# Patient Record
Sex: MALE | Race: WHITE | NOT HISPANIC OR LATINO | Employment: OTHER | URBAN - METROPOLITAN AREA
[De-identification: names, ages, dates, MRNs, and addresses within clinical notes are randomized per-mention and may not be internally consistent; named-entity substitution may affect disease eponyms.]

---

## 2018-04-17 LAB
ABSOL LYMPHOCYTES (HISTORICAL): 1.4 K/UL (ref 0.5–4)
ALBUMIN SERPL BCP-MCNC: 4.1 G/DL (ref 3–5.2)
ALP SERPL-CCNC: 96 U/L (ref 43–122)
ALT SERPL W P-5'-P-CCNC: 31 U/L (ref 9–52)
ANION GAP SERPL CALCULATED.3IONS-SCNC: 10 MMOL/L (ref 5–14)
APTT PPP: 28 SEC (ref 23–31)
AST SERPL W P-5'-P-CCNC: 21 U/L (ref 17–59)
BASOPHILS # BLD AUTO: 0 K/UL (ref 0–0.1)
BASOPHILS # BLD AUTO: 1 % (ref 0–1)
BILIRUB SERPL-MCNC: 1.1 MG/DL
BILIRUB UR QL STRIP: NEGATIVE MG/DL
BUN SERPL-MCNC: 13 MG/DL (ref 5–25)
C-REACTIVE PROTEIN (HISTORICAL): <0.5 MG/DL
CALCIUM SERPL-MCNC: 9.5 MG/DL (ref 8.4–10.2)
CHLORIDE SERPL-SCNC: 108 MEQ/L (ref 97–108)
CHOLEST SERPL-MCNC: 183 MG/DL
CHOLEST/HDLC SERPL: 3.7 {RATIO}
CLARITY UR: CLEAR
CO2 SERPL-SCNC: 24 MMOL/L (ref 22–30)
COLOR UR: YELLOW
CREATINE, SERUM (HISTORICAL): 0.92 MG/DL (ref 0.7–1.5)
CREATININE, RANDOM URINE (HISTORICAL): 92.7 MG/DL (ref 50–200)
DEPRECATED RDW RBC AUTO: 13.4 %
EGFR (HISTORICAL): >60 ML/MIN/1.73 M2
EOSINOPHIL # BLD AUTO: 0 K/UL (ref 0–0.4)
EOSINOPHIL NFR BLD AUTO: 0 % (ref 0–6)
ERYTHROCYTE SEDIMENTATION RATE (HISTORICAL): 14 MM (ref 1–20)
EST. AVERAGE GLUCOSE BLD GHB EST-MCNC: 146 MG/DL
GLUCOSE SERPL-MCNC: 101 MG/DL (ref 70–99)
GLUCOSE UR STRIP-MCNC: NEGATIVE MG/DL
HBA1C MFR BLD HPLC: 6.7 %
HCT VFR BLD AUTO: 42.1 % (ref 41–53)
HDLC SERPL-MCNC: 50 MG/DL
HGB BLD-MCNC: 13.8 G/DL (ref 13.5–17.5)
HGB UR QL STRIP.AUTO: NEGATIVE
KETONES UR STRIP-MCNC: NEGATIVE MG/DL
LDH (HISTORICAL): 354 U/L (ref 313–618)
LDL/HDL RATIO (HISTORICAL): 2.3
LDLC SERPL CALC-MCNC: 114 MG/DL
LEUKOCYTE ESTERASE UR QL STRIP: NEGATIVE
LYMPHOCYTES NFR BLD AUTO: 25 % (ref 25–45)
MCH RBC QN AUTO: 29 PG (ref 26–34)
MCHC RBC AUTO-ENTMCNC: 32.7 % (ref 31–36)
MCV RBC AUTO: 89 FL (ref 80–100)
MICROALBUM.,U,RANDOM (HISTORICAL): <0.6 MG/DL
MICROALBUMIN/CREATININE RATIO (HISTORICAL): NORMAL
MONOCYTES # BLD AUTO: 0.5 K/UL (ref 0.2–0.9)
MONOCYTES NFR BLD AUTO: 9 % (ref 1–10)
NEUTROPHILS ABS COUNT (HISTORICAL): 3.7 K/UL (ref 1.8–7.8)
NEUTS SEG NFR BLD AUTO: 65 % (ref 45–65)
NITRITE UR QL STRIP: NEGATIVE
PH UR STRIP.AUTO: 7 [PH] (ref 4.5–8)
PLATELET # BLD AUTO: 107 K/MCL (ref 150–450)
POTASSIUM SERPL-SCNC: 4.2 MEQ/L (ref 3.6–5)
PROT UR STRIP-MCNC: NEGATIVE MG/DL
PT - I.N. RATIO (HISTORICAL): 1.1 RATIO(INR)
PT, PATIENT (HISTORICAL): 10.7 SEC (ref 9.2–11.1)
RBC # BLD AUTO: 4.74 M/MCL (ref 4.5–5.9)
SODIUM SERPL-SCNC: 142 MEQ/L (ref 137–147)
SP GR UR STRIP.AUTO: 1.01 (ref 1–1.04)
TOTAL PROTEIN (HISTORICAL): 6.6 G/DL (ref 5.9–8.4)
TRIGL SERPL-MCNC: 93 MG/DL
TSH SERPL DL<=0.05 MIU/L-ACNC: 2.62 UIU/ML (ref 0.47–4.68)
UROBILINOGEN UR QL STRIP.AUTO: NEGATIVE MG/DL (ref 0–1)
VIT B12 SERPL-MCNC: 428 PG/ML (ref 239–931)
VITAMIN D25-HYDROXY (HISTORICAL): <12.8 NG/ML (ref 30–100)
VLDLC SERPL CALC-MCNC: 19 MG/DL (ref 0–40)
WBC # BLD AUTO: 5.6 K/MCL (ref 4.5–11)

## 2018-04-19 LAB — METHYLMALONIC ACID, S (HISTORICAL): 0.31

## 2018-07-10 RX ORDER — CYANOCOBALAMIN 1000 UG/ML
1000 INJECTION INTRAMUSCULAR; SUBCUTANEOUS ONCE
Status: COMPLETED | OUTPATIENT
Start: 2018-07-11 | End: 2018-07-11

## 2018-07-11 ENCOUNTER — HOSPITAL ENCOUNTER (OUTPATIENT)
Dept: INFUSION CENTER | Facility: HOSPITAL | Age: 53
Discharge: HOME | End: 2018-07-11
Payer: COMMERCIAL

## 2018-07-11 PROCEDURE — 96372 THER/PROPH/DIAG INJ SC/IM: CPT

## 2018-07-11 RX ORDER — LITHIUM CARBONATE 300 MG
300 TABLET ORAL 2 TIMES DAILY
COMMUNITY

## 2018-07-11 RX ADMIN — CYANOCOBALAMIN 1000 MCG: 1000 INJECTION INTRAMUSCULAR; SUBCUTANEOUS at 11:27

## 2018-08-04 ENCOUNTER — APPOINTMENT (OUTPATIENT)
Dept: LAB | Facility: HOSPITAL | Age: 53
End: 2018-08-04
Payer: COMMERCIAL

## 2018-08-04 ENCOUNTER — TRANSCRIBE ORDERS (OUTPATIENT)
Dept: ADMINISTRATIVE | Facility: HOSPITAL | Age: 53
End: 2018-08-04

## 2018-08-04 DIAGNOSIS — E13.8 DIABETES MELLITUS OF OTHER TYPE WITH COMPLICATION, UNSPECIFIED WHETHER LONG TERM INSULIN USE: Primary | ICD-10-CM

## 2018-08-04 DIAGNOSIS — I10 ESSENTIAL HYPERTENSION, MALIGNANT: ICD-10-CM

## 2018-08-04 DIAGNOSIS — E78.5 HYPERLIPIDEMIA, UNSPECIFIED HYPERLIPIDEMIA TYPE: ICD-10-CM

## 2018-08-04 DIAGNOSIS — E13.8 DIABETES MELLITUS OF OTHER TYPE WITH COMPLICATION, UNSPECIFIED WHETHER LONG TERM INSULIN USE: ICD-10-CM

## 2018-08-04 LAB
ALBUMIN SERPL BCP-MCNC: 3.9 G/DL (ref 3–5.2)
ALP SERPL-CCNC: 84 U/L (ref 43–122)
ALT SERPL W P-5'-P-CCNC: 38 U/L (ref 9–52)
ANION GAP SERPL CALCULATED.3IONS-SCNC: 9 MMOL/L (ref 5–14)
AST SERPL W P-5'-P-CCNC: 26 U/L (ref 17–59)
BASOPHILS # BLD AUTO: 0 THOUSANDS/ΜL (ref 0–0.1)
BASOPHILS NFR BLD AUTO: 0 % (ref 0–1)
BILIRUB SERPL-MCNC: 0.7 MG/DL
BUN SERPL-MCNC: 10 MG/DL (ref 5–25)
CALCIUM SERPL-MCNC: 9.1 MG/DL (ref 8.4–10.2)
CHLORIDE SERPL-SCNC: 113 MMOL/L (ref 97–108)
CHOLEST SERPL-MCNC: 173 MG/DL
CO2 SERPL-SCNC: 23 MMOL/L (ref 22–30)
CREAT SERPL-MCNC: 0.94 MG/DL (ref 0.7–1.5)
EOSINOPHIL # BLD AUTO: 0 THOUSAND/ΜL (ref 0–0.4)
EOSINOPHIL NFR BLD AUTO: 0 % (ref 0–6)
ERYTHROCYTE [DISTWIDTH] IN BLOOD BY AUTOMATED COUNT: 13.4 %
EST. AVERAGE GLUCOSE BLD GHB EST-MCNC: 117 MG/DL
GFR SERPL CREATININE-BSD FRML MDRD: 93 ML/MIN/1.73SQ M
GLUCOSE P FAST SERPL-MCNC: 84 MG/DL (ref 70–99)
HBA1C MFR BLD: 5.7 % (ref 4.2–6.3)
HCT VFR BLD AUTO: 40.1 % (ref 41–53)
HDLC SERPL-MCNC: 39 MG/DL (ref 40–59)
HGB BLD-MCNC: 13.3 G/DL (ref 13.5–17.5)
LDLC SERPL CALC-MCNC: 110 MG/DL
LYMPHOCYTES # BLD AUTO: 1.3 THOUSANDS/ΜL (ref 0.5–4)
LYMPHOCYTES NFR BLD AUTO: 27 % (ref 20–50)
MCH RBC QN AUTO: 30 PG (ref 26–34)
MCHC RBC AUTO-ENTMCNC: 33.3 G/DL (ref 31–36)
MCV RBC AUTO: 90 FL (ref 80–100)
MONOCYTES # BLD AUTO: 0.4 THOUSAND/ΜL (ref 0.2–0.9)
MONOCYTES NFR BLD AUTO: 8 % (ref 1–10)
NEUTROPHILS # BLD AUTO: 3.3 THOUSANDS/ΜL (ref 1.8–7.8)
NEUTS SEG NFR BLD AUTO: 66 % (ref 45–65)
NONHDLC SERPL-MCNC: 134 MG/DL
PLATELET # BLD AUTO: 118 THOUSANDS/UL (ref 150–450)
PMV BLD AUTO: 8.6 FL (ref 8.9–12.7)
POTASSIUM SERPL-SCNC: 3.9 MMOL/L (ref 3.6–5)
PROT SERPL-MCNC: 6.7 G/DL (ref 5.9–8.4)
RBC # BLD AUTO: 4.45 MILLION/UL (ref 4.5–5.9)
SODIUM SERPL-SCNC: 145 MMOL/L (ref 137–147)
TRIGL SERPL-MCNC: 118 MG/DL
TSH SERPL DL<=0.05 MIU/L-ACNC: 2.94 UIU/ML (ref 0.47–4.68)
WBC # BLD AUTO: 5 THOUSAND/UL (ref 4.5–11)

## 2018-08-04 PROCEDURE — 85025 COMPLETE CBC W/AUTO DIFF WBC: CPT

## 2018-08-04 PROCEDURE — 36415 COLL VENOUS BLD VENIPUNCTURE: CPT

## 2018-08-04 PROCEDURE — 84443 ASSAY THYROID STIM HORMONE: CPT

## 2018-08-04 PROCEDURE — 80061 LIPID PANEL: CPT

## 2018-08-04 PROCEDURE — 80053 COMPREHEN METABOLIC PANEL: CPT

## 2018-08-04 PROCEDURE — 83036 HEMOGLOBIN GLYCOSYLATED A1C: CPT

## 2018-08-07 RX ORDER — CYANOCOBALAMIN 1000 UG/ML
1000 INJECTION INTRAMUSCULAR; SUBCUTANEOUS ONCE
Status: COMPLETED | OUTPATIENT
Start: 2018-08-08 | End: 2018-08-08

## 2018-08-08 ENCOUNTER — HOSPITAL ENCOUNTER (OUTPATIENT)
Dept: INFUSION CENTER | Facility: HOSPITAL | Age: 53
Discharge: HOME | End: 2018-08-08
Payer: COMMERCIAL

## 2018-08-08 PROCEDURE — 96372 THER/PROPH/DIAG INJ SC/IM: CPT

## 2018-08-08 RX ORDER — CYANOCOBALAMIN 1000 UG/ML
INJECTION INTRAMUSCULAR; SUBCUTANEOUS
Status: COMPLETED
Start: 2018-08-08 | End: 2018-08-08

## 2018-08-08 RX ORDER — ALPRAZOLAM 0.5 MG/1
TABLET ORAL 3 TIMES DAILY
COMMUNITY

## 2018-08-08 RX ORDER — CLOZAPINE 100 MG/1
100 TABLET ORAL DAILY
COMMUNITY

## 2018-08-08 RX ADMIN — CYANOCOBALAMIN 1000 MCG: 1000 INJECTION INTRAMUSCULAR; SUBCUTANEOUS at 13:24

## 2018-08-08 NOTE — PROGRESS NOTES
Patient tolerated B12 to right deltoid IM without complications  Aware of next appts and AVS provided

## 2018-08-08 NOTE — PLAN OF CARE
Problem: Potential for Falls  Goal: Patient will remain free of falls  INTERVENTIONS:  - Assess patient frequently for physical needs  -  Identify cognitive and physical deficits and behaviors that affect risk of falls    -  [place] fall precautions as indicated by assessment   - Educate patient/family on patient safety including physical limitations  - Instruct patient to call for assistance with activity based on assessment  - Modify environment to reduce risk of injury  - Consider OT/PT consult to assist with strengthening/mobility   Outcome: Progressing

## 2018-09-04 RX ORDER — CYANOCOBALAMIN 1000 UG/ML
1000 INJECTION INTRAMUSCULAR; SUBCUTANEOUS ONCE
Status: COMPLETED | OUTPATIENT
Start: 2018-09-05 | End: 2018-09-05

## 2018-09-05 ENCOUNTER — HOSPITAL ENCOUNTER (OUTPATIENT)
Dept: INFUSION CENTER | Facility: HOSPITAL | Age: 53
Discharge: HOME | End: 2018-09-05
Payer: COMMERCIAL

## 2018-09-05 PROCEDURE — 96372 THER/PROPH/DIAG INJ SC/IM: CPT

## 2018-09-05 RX ORDER — CYANOCOBALAMIN 1000 UG/ML
INJECTION INTRAMUSCULAR; SUBCUTANEOUS
Status: COMPLETED
Start: 2018-09-05 | End: 2018-09-05

## 2018-09-05 RX ADMIN — CYANOCOBALAMIN 1000 MCG: 1000 INJECTION INTRAMUSCULAR; SUBCUTANEOUS at 08:35

## 2018-09-05 NOTE — PLAN OF CARE
Problem: Potential for Falls  Goal: Patient will remain free of falls  INTERVENTIONS:  - Assess patient frequently for physical needs  -  Identify cognitive and physical deficits and behaviors that affect risk of falls    -  [place] fall precautions as indicated by assessment   - Educate patient/family on patient safety including physical limitations  - Instruct patient to call for assistance with activity based on assessment  - Modify environment to reduce risk of injury  - Consider OT/PT consult to assist with strengthening/mobility   Outcome: Progressing

## 2018-09-05 NOTE — PROGRESS NOTES
Patient arrives to unit without complaints  Patient tolerated Vit B12 to right deltoid without complications  Confirmed next appt and AVS declined

## 2018-09-10 ENCOUNTER — APPOINTMENT (OUTPATIENT)
Dept: LAB | Facility: HOSPITAL | Age: 53
End: 2018-09-10
Attending: INTERNAL MEDICINE
Payer: COMMERCIAL

## 2018-09-10 ENCOUNTER — TRANSCRIBE ORDERS (OUTPATIENT)
Dept: ADMINISTRATIVE | Facility: HOSPITAL | Age: 53
End: 2018-09-10

## 2018-09-10 DIAGNOSIS — D51.9 ANEMIA DUE TO VITAMIN B12 DEFICIENCY, UNSPECIFIED B12 DEFICIENCY TYPE: ICD-10-CM

## 2018-09-10 DIAGNOSIS — D69.6 THROMBOCYTOPENIA, UNSPECIFIED (HCC): Primary | ICD-10-CM

## 2018-09-10 DIAGNOSIS — D69.6 THROMBOCYTOPENIA, UNSPECIFIED (HCC): ICD-10-CM

## 2018-09-10 LAB
ALBUMIN SERPL BCP-MCNC: 4 G/DL (ref 3–5.2)
ALP SERPL-CCNC: 88 U/L (ref 43–122)
ALT SERPL W P-5'-P-CCNC: 26 U/L (ref 9–52)
ANION GAP SERPL CALCULATED.3IONS-SCNC: 7 MMOL/L (ref 5–14)
AST SERPL W P-5'-P-CCNC: 19 U/L (ref 17–59)
BASOPHILS # BLD AUTO: 0 THOUSANDS/ΜL (ref 0–0.1)
BASOPHILS NFR BLD AUTO: 1 % (ref 0–1)
BILIRUB SERPL-MCNC: 0.7 MG/DL
BUN SERPL-MCNC: 9 MG/DL (ref 5–25)
CALCIUM SERPL-MCNC: 9.5 MG/DL (ref 8.4–10.2)
CHLORIDE SERPL-SCNC: 110 MMOL/L (ref 97–108)
CO2 SERPL-SCNC: 28 MMOL/L (ref 22–30)
CREAT SERPL-MCNC: 0.95 MG/DL (ref 0.7–1.5)
CRP SERPL QL: 4.5 MG/L
EOSINOPHIL # BLD AUTO: 0 THOUSAND/ΜL (ref 0–0.4)
EOSINOPHIL NFR BLD AUTO: 0 % (ref 0–6)
ERYTHROCYTE [DISTWIDTH] IN BLOOD BY AUTOMATED COUNT: 13.1 %
ERYTHROCYTE [SEDIMENTATION RATE] IN BLOOD: 15 MM/HOUR (ref 1–20)
GFR SERPL CREATININE-BSD FRML MDRD: 92 ML/MIN/1.73SQ M
GLUCOSE P FAST SERPL-MCNC: 97 MG/DL (ref 70–99)
HCT VFR BLD AUTO: 40.2 % (ref 41–53)
HGB BLD-MCNC: 13.3 G/DL (ref 13.5–17.5)
LDH SERPL-CCNC: 332 U/L (ref 313–618)
LYMPHOCYTES # BLD AUTO: 1.2 THOUSANDS/ΜL (ref 0.5–4)
LYMPHOCYTES NFR BLD AUTO: 23 % (ref 20–50)
MCH RBC QN AUTO: 30 PG (ref 26–34)
MCHC RBC AUTO-ENTMCNC: 33 G/DL (ref 31–36)
MCV RBC AUTO: 91 FL (ref 80–100)
MONOCYTES # BLD AUTO: 0.5 THOUSAND/ΜL (ref 0.2–0.9)
MONOCYTES NFR BLD AUTO: 10 % (ref 1–10)
NEUTROPHILS # BLD AUTO: 3.4 THOUSANDS/ΜL (ref 1.8–7.8)
NEUTS SEG NFR BLD AUTO: 67 % (ref 45–65)
PLATELET # BLD AUTO: 129 THOUSANDS/UL (ref 150–450)
PMV BLD AUTO: 8.5 FL (ref 8.9–12.7)
POTASSIUM SERPL-SCNC: 4.2 MMOL/L (ref 3.6–5)
PROT SERPL-MCNC: 7.2 G/DL (ref 5.9–8.4)
RBC # BLD AUTO: 4.42 MILLION/UL (ref 4.5–5.9)
SODIUM SERPL-SCNC: 145 MMOL/L (ref 137–147)
VIT B12 SERPL-MCNC: 715 PG/ML (ref 100–900)
WBC # BLD AUTO: 5.2 THOUSAND/UL (ref 4.5–11)

## 2018-09-10 PROCEDURE — 36415 COLL VENOUS BLD VENIPUNCTURE: CPT

## 2018-09-10 PROCEDURE — 83918 ORGANIC ACIDS TOTAL QUANT: CPT

## 2018-09-10 PROCEDURE — 85025 COMPLETE CBC W/AUTO DIFF WBC: CPT

## 2018-09-10 PROCEDURE — 82607 VITAMIN B-12: CPT

## 2018-09-10 PROCEDURE — 80053 COMPREHEN METABOLIC PANEL: CPT

## 2018-09-10 PROCEDURE — 85652 RBC SED RATE AUTOMATED: CPT

## 2018-09-10 PROCEDURE — 83615 LACTATE (LD) (LDH) ENZYME: CPT

## 2018-09-10 PROCEDURE — 86140 C-REACTIVE PROTEIN: CPT

## 2018-09-12 ENCOUNTER — OFFICE VISIT (OUTPATIENT)
Dept: HEMATOLOGY ONCOLOGY | Facility: CLINIC | Age: 53
End: 2018-09-12
Payer: COMMERCIAL

## 2018-09-12 VITALS
TEMPERATURE: 97.8 F | HEART RATE: 88 BPM | DIASTOLIC BLOOD PRESSURE: 70 MMHG | OXYGEN SATURATION: 98 % | BODY MASS INDEX: 26.82 KG/M2 | WEIGHT: 198 LBS | HEIGHT: 72 IN | RESPIRATION RATE: 16 BRPM | SYSTOLIC BLOOD PRESSURE: 110 MMHG

## 2018-09-12 DIAGNOSIS — D69.6 THROMBOCYTOPENIA (HCC): Primary | ICD-10-CM

## 2018-09-12 DIAGNOSIS — D64.9 ANEMIA, UNSPECIFIED TYPE: ICD-10-CM

## 2018-09-12 LAB
METHYLMALONATE SERPL-SCNC: 168 NMOL/L (ref 0–378)
SL AMB DISCLAIMER: NORMAL

## 2018-09-12 PROCEDURE — 99213 OFFICE O/P EST LOW 20 MIN: CPT | Performed by: INTERNAL MEDICINE

## 2018-09-12 NOTE — PROGRESS NOTES
Hematology/Oncology Outpatient Follow-up  Luba Ocampo 47 y o  male 1965 [id number]    Date:  9/12/2018    Assessment and Plan:  1  Thrombocytopenia ([address] )  Chronic and stable Most likely due to the liver cirrhosis  We will continue to monitor him every 6 months   - CBC and differential; Future  - Comprehensive metabolic panel; Future  - C-reactive protein; Future  - Sedimentation rate, automated; Future    2  Anemia, unspecified type  Mild normocytic, Multifactorial including anemia of chronic disease, we will then initiate workup prior to his next visit  - Iron Panel; Future  - Vitamin B12; Future      HPI:    The patient came in today for a follow-up visit  He has multiple comorbid conditions including heart arrhythmia, type 2 diabetes mellitus, liver cirrhosis due to fatty liver, history of arthritis, or gout, hyperuricemia, hypothyroidism, schizoaffective disorder, anxiety, depression, etc     Patient had extensive workup for his mild chronic stable thrombocytopenia which goes back to at least 2015  He was found to have vitamin B12 deficiency which was corrected through vitamin B12 injections  He had no hint of monoclonal gammopathy  His most recent blood work shows hemoglobin level of 13 3 and a platelet count of a 723004  The patient denies bleeding or easy bruising  Interval history:    ROS: Review of Systems   Constitutional: Positive for appetite change and fatigue  Negative for diaphoresis and fever  HENT: Negative for congestion, dental problem, facial swelling, hearing loss, tinnitus and trouble swallowing  Eyes: Negative for visual disturbance  Respiratory: Negative for cough, chest tightness, shortness of breath and wheezing  Cardiovascular: Negative for chest pain and leg swelling  Gastrointestinal: Negative for abdominal distention, abdominal pain, blood in stool, constipation, diarrhea, nausea and vomiting  Genitourinary: Negative for dysuria, hematuria and urgency  Musculoskeletal: Negative for arthralgias, myalgias and neck pain  Skin: Negative  Negative for color change, pallor, rash and wound  Neurological: Positive for dizziness and headaches  Negative for weakness and numbness  Hematological: Negative for adenopathy  Psychiatric/Behavioral: Positive for sleep disturbance  Negative for agitation, behavioral problems, confusion, hallucinations and self-injury  The patient is not nervous/anxious and is not hyperactive  Past Medical History:   Diagnosis Date    Bipolar disorder ([place] [address] )     Diabetes mellitus, type II ([place] [address] )     PAT (paroxysmal atrial tachycardia) ([place] [address] )     Schizophrenic disorder ([place] [address] )        History reviewed  No pertinent surgical history      Social History     Social History    Marital status: Single     Spouse name: N/A    Number of children: N/A    Years of education: N/A     Social History Main Topics    Smoking status: Current Every Day Smoker    Smokeless tobacco: Never Used    Alcohol use No    Drug use: No    Sexual activity: Not Asked     Other Topics Concern    None     Social History Narrative    None       Family History   Problem Relation Age of Onset    Coronary artery disease Mother         premature       Allergies   Allergen Reactions    Bupropion     Chlorproethazine     Molindone     Paliperidone     Thiazide-Type Diuretics          Current Outpatient Prescriptions:     ALPRAZolam (XANAX) 0 5 mg tablet, Take by mouth 3 (three) times a day, Disp: , Rfl:     cloZAPine (CLOZARIL) 100 mg tablet, Take 100 mg by mouth daily Takes one 200mg tab and one 100mg tab at bedtime = 300mg, Disp: , Rfl:     lithium 300 MG tablet, Take 300 mg by mouth 2 (two) times a day, Disp: , Rfl:       Physical Exam:  /70 (BP Location: Left arm, Patient Position: Sitting, Cuff Size: Adult)   Pulse 88   Temp 97 8 °F (36 6 °C) (Tympanic)   Resp 16   Ht 6' (1 829 m)   Wt 89 8 kg (198 lb)   SpO2 98%   BMI 26 85 kg/m² Physical Exam   Constitutional: He is oriented to person, place, and time  He appears well-developed and well-nourished  HENT:   Head: Normocephalic and atraumatic  Nose: Nose normal    Mouth/Throat: Oropharynx is clear and moist    Eyes: Conjunctivae and EOM are normal  Pupils are equal, round, and reactive to light  Right eye exhibits no discharge  Left eye exhibits no discharge  No scleral icterus  Neck: Normal range of motion  Neck supple  No tracheal deviation present  No thyromegaly present  Cardiovascular: Normal rate, regular rhythm and normal heart sounds  Exam reveals no friction rub  No murmur heard  Pulmonary/Chest: Effort normal and breath sounds normal  No respiratory distress  He has no wheezes  He has no rales  He exhibits no tenderness  Abdominal: Soft  Bowel sounds are normal  He exhibits no distension and no mass  There is no hepatosplenomegaly, splenomegaly or hepatomegaly  There is no tenderness  There is no rebound and no guarding  Obese with abdominal wall hernia   Musculoskeletal: Normal range of motion  He exhibits no edema, tenderness or deformity  Lymphadenopathy:     He has no cervical adenopathy  Neurological: He is alert and oriented to person, place, and time  He has normal reflexes  No cranial nerve deficit  Coordination normal    Skin: Skin is warm and dry  No rash noted  No erythema  No pallor  Psychiatric: He has a normal mood and affect   His behavior is normal  Judgment and thought content normal          Labs:  Lab Results   Component Value Date    WBC 5 20 09/10/2018    HGB 13 3 (L) 09/10/2018    HCT 40 2 (L) 09/10/2018    MCV 91 09/10/2018     (L) 09/10/2018     Lab Results   Component Value Date     09/10/2018    K 4 2 09/10/2018     (H) 09/10/2018    CO2 28 09/10/2018    ANIONGAP 10 04/17/2018    BUN 9 09/10/2018    CREATININE 0 95 09/10/2018    GLUCOSE 101 (H) 04/17/2018    GLUF 97 09/10/2018    CALCIUM 9 5 09/10/2018    AST 19 09/10/2018    ALT 26 09/10/2018    ALKPHOS 88 09/10/2018    PROT 6 6 04/17/2018    BILITOT 1 1 04/17/2018    EGFR 92 09/10/2018       Patient voiced understanding and agreement in the above discussion  Aware to contact our office with questions/symptoms in the interim

## 2018-10-02 RX ORDER — CYANOCOBALAMIN 1000 UG/ML
1000 INJECTION INTRAMUSCULAR; SUBCUTANEOUS ONCE
Status: COMPLETED | OUTPATIENT
Start: 2018-10-03 | End: 2018-10-03

## 2018-10-03 ENCOUNTER — HOSPITAL ENCOUNTER (OUTPATIENT)
Dept: INFUSION CENTER | Facility: HOSPITAL | Age: 53
Discharge: HOME | End: 2018-10-03
Payer: COMMERCIAL

## 2018-10-03 PROCEDURE — 96372 THER/PROPH/DIAG INJ SC/IM: CPT

## 2018-10-03 RX ORDER — CYANOCOBALAMIN 1000 UG/ML
INJECTION INTRAMUSCULAR; SUBCUTANEOUS
Status: COMPLETED
Start: 2018-10-03 | End: 2018-10-03

## 2018-10-03 RX ADMIN — CYANOCOBALAMIN: 1000 INJECTION INTRAMUSCULAR; SUBCUTANEOUS at 09:05

## 2018-10-03 NOTE — PROGRESS NOTES
Admitted to infusion center today for monthly vitamin b 12 injection  Tolerated same well   Discharged ambulatory with avs

## 2018-10-30 RX ORDER — CYANOCOBALAMIN 1000 UG/ML
1000 INJECTION INTRAMUSCULAR; SUBCUTANEOUS ONCE
Status: COMPLETED | OUTPATIENT
Start: 2018-10-31 | End: 2018-10-31

## 2018-10-31 ENCOUNTER — HOSPITAL ENCOUNTER (OUTPATIENT)
Dept: INFUSION CENTER | Facility: HOSPITAL | Age: 53
Discharge: HOME | End: 2018-10-31
Payer: COMMERCIAL

## 2018-10-31 PROCEDURE — 96372 THER/PROPH/DIAG INJ SC/IM: CPT

## 2018-10-31 RX ORDER — CYANOCOBALAMIN 1000 UG/ML
INJECTION INTRAMUSCULAR; SUBCUTANEOUS
Status: COMPLETED
Start: 2018-10-31 | End: 2018-10-31

## 2018-10-31 RX ADMIN — CYANOCOBALAMIN 1000 MCG: 1000 INJECTION INTRAMUSCULAR; SUBCUTANEOUS at 08:00

## 2018-10-31 NOTE — PROGRESS NOTES
Patient here for every 4 week Vit B12  Tolerated well to right deltoid IM    Confirmed next appt and pt declined AVS

## 2018-10-31 NOTE — PLAN OF CARE
Problem: Potential for Falls  Goal: Patient will remain free of falls  INTERVENTIONS:  - Assess patient frequently for physical needs  -  Identify cognitive and physical deficits and behaviors that affect risk of falls    -  [place] fall precautions as indicated by assessment   - Educate patient/family on patient safety including physical limitations  - Instruct patient to call for assistance with activity based on assessment  - Modify environment to reduce risk of injury  - Consider OT/PT consult to assist with strengthening/mobility   Outcome: Progressing

## 2018-11-27 RX ORDER — CYANOCOBALAMIN 1000 UG/ML
1000 INJECTION INTRAMUSCULAR; SUBCUTANEOUS ONCE
Status: COMPLETED | OUTPATIENT
Start: 2018-11-28 | End: 2018-11-28

## 2018-11-28 ENCOUNTER — HOSPITAL ENCOUNTER (OUTPATIENT)
Dept: INFUSION CENTER | Facility: HOSPITAL | Age: 53
Discharge: HOME | End: 2018-11-28
Payer: COMMERCIAL

## 2018-11-28 PROCEDURE — 96372 THER/PROPH/DIAG INJ SC/IM: CPT

## 2018-11-28 RX ADMIN — CYANOCOBALAMIN 1000 MCG: 1000 INJECTION INTRAMUSCULAR; SUBCUTANEOUS at 07:55

## 2018-11-28 NOTE — PLAN OF CARE
Problem: Potential for Falls  Goal: Patient will remain free of falls  INTERVENTIONS:  - Assess patient frequently for physical needs  -  Identify cognitive and physical deficits and behaviors that affect risk of falls    -  [place] fall precautions as indicated by assessment   - Educate patient/family on patient safety including physical limitations  - Instruct patient to call for assistance with activity based on assessment  - Modify environment to reduce risk of injury  - Consider OT/PT consult to assist with strengthening/mobility   Outcome: Progressing

## 2018-12-01 ENCOUNTER — TRANSCRIBE ORDERS (OUTPATIENT)
Dept: ADMINISTRATIVE | Facility: HOSPITAL | Age: 53
End: 2018-12-01

## 2018-12-01 ENCOUNTER — APPOINTMENT (OUTPATIENT)
Dept: LAB | Facility: HOSPITAL | Age: 53
End: 2018-12-01
Payer: COMMERCIAL

## 2018-12-01 DIAGNOSIS — E11.9 DIABETES MELLITUS WITHOUT COMPLICATION (HCC): ICD-10-CM

## 2018-12-01 DIAGNOSIS — M13.0 POLYARTHROPATHY: ICD-10-CM

## 2018-12-01 DIAGNOSIS — E11.9 DIABETES MELLITUS WITHOUT COMPLICATION (HCC): Primary | ICD-10-CM

## 2018-12-01 DIAGNOSIS — M25.561 PAIN IN BOTH KNEES, UNSPECIFIED CHRONICITY: ICD-10-CM

## 2018-12-01 DIAGNOSIS — I10 ESSENTIAL HYPERTENSION, MALIGNANT: ICD-10-CM

## 2018-12-01 DIAGNOSIS — M25.562 PAIN IN BOTH KNEES, UNSPECIFIED CHRONICITY: ICD-10-CM

## 2018-12-01 LAB
ALBUMIN SERPL BCP-MCNC: 4.1 G/DL (ref 3–5.2)
ALP SERPL-CCNC: 86 U/L (ref 43–122)
ALT SERPL W P-5'-P-CCNC: 27 U/L (ref 9–52)
ANION GAP SERPL CALCULATED.3IONS-SCNC: 7 MMOL/L (ref 5–14)
AST SERPL W P-5'-P-CCNC: 25 U/L (ref 17–59)
BASOPHILS # BLD AUTO: 0 THOUSANDS/ΜL (ref 0–0.1)
BASOPHILS NFR BLD AUTO: 1 % (ref 0–1)
BILIRUB SERPL-MCNC: 0.9 MG/DL
BUN SERPL-MCNC: 10 MG/DL (ref 5–25)
CALCIUM SERPL-MCNC: 9.3 MG/DL (ref 8.4–10.2)
CHLORIDE SERPL-SCNC: 109 MMOL/L (ref 97–108)
CO2 SERPL-SCNC: 27 MMOL/L (ref 22–30)
CREAT SERPL-MCNC: 1.01 MG/DL (ref 0.7–1.5)
EOSINOPHIL # BLD AUTO: 0 THOUSAND/ΜL (ref 0–0.4)
EOSINOPHIL NFR BLD AUTO: 0 % (ref 0–6)
ERYTHROCYTE [DISTWIDTH] IN BLOOD BY AUTOMATED COUNT: 14.4 %
EST. AVERAGE GLUCOSE BLD GHB EST-MCNC: 105 MG/DL
GFR SERPL CREATININE-BSD FRML MDRD: 85 ML/MIN/1.73SQ M
GLUCOSE P FAST SERPL-MCNC: 80 MG/DL (ref 70–99)
HBA1C MFR BLD: 5.3 % (ref 4.2–6.3)
HCT VFR BLD AUTO: 41.5 % (ref 41–53)
HGB BLD-MCNC: 13.4 G/DL (ref 13.5–17.5)
LYMPHOCYTES # BLD AUTO: 1.1 THOUSANDS/ΜL (ref 0.5–4)
LYMPHOCYTES NFR BLD AUTO: 27 % (ref 20–50)
MCH RBC QN AUTO: 29.1 PG (ref 26–34)
MCHC RBC AUTO-ENTMCNC: 32.2 G/DL (ref 31–36)
MCV RBC AUTO: 90 FL (ref 80–100)
MONOCYTES # BLD AUTO: 0.3 THOUSAND/ΜL (ref 0.2–0.9)
MONOCYTES NFR BLD AUTO: 8 % (ref 1–10)
NEUTROPHILS # BLD AUTO: 2.6 THOUSANDS/ΜL (ref 1.8–7.8)
NEUTS SEG NFR BLD AUTO: 64 % (ref 45–65)
PLATELET # BLD AUTO: 121 THOUSANDS/UL (ref 150–450)
PMV BLD AUTO: 8.4 FL (ref 8.9–12.7)
POTASSIUM SERPL-SCNC: 3.9 MMOL/L (ref 3.6–5)
PROT SERPL-MCNC: 6.8 G/DL (ref 5.9–8.4)
RBC # BLD AUTO: 4.59 MILLION/UL (ref 4.5–5.9)
SODIUM SERPL-SCNC: 143 MMOL/L (ref 137–147)
WBC # BLD AUTO: 4 THOUSAND/UL (ref 4.5–11)

## 2018-12-01 PROCEDURE — 80053 COMPREHEN METABOLIC PANEL: CPT

## 2018-12-01 PROCEDURE — 36415 COLL VENOUS BLD VENIPUNCTURE: CPT | Performed by: FAMILY MEDICINE

## 2018-12-01 PROCEDURE — 83036 HEMOGLOBIN GLYCOSYLATED A1C: CPT | Performed by: FAMILY MEDICINE

## 2018-12-01 PROCEDURE — 85025 COMPLETE CBC W/AUTO DIFF WBC: CPT

## 2018-12-08 ENCOUNTER — HOSPITAL ENCOUNTER (OUTPATIENT)
Dept: RADIOLOGY | Facility: HOSPITAL | Age: 53
Discharge: HOME | End: 2018-12-08
Payer: COMMERCIAL

## 2018-12-08 ENCOUNTER — APPOINTMENT (OUTPATIENT)
Dept: LAB | Facility: HOSPITAL | Age: 53
End: 2018-12-08
Payer: COMMERCIAL

## 2018-12-08 DIAGNOSIS — M25.561 PAIN IN BOTH KNEES, UNSPECIFIED CHRONICITY: ICD-10-CM

## 2018-12-08 DIAGNOSIS — M13.0 POLYARTHROPATHY: ICD-10-CM

## 2018-12-08 DIAGNOSIS — M25.562 PAIN IN BOTH KNEES, UNSPECIFIED CHRONICITY: ICD-10-CM

## 2018-12-08 LAB — ERYTHROCYTE [SEDIMENTATION RATE] IN BLOOD: 8 MM/HOUR (ref 1–20)

## 2018-12-08 PROCEDURE — 86618 LYME DISEASE ANTIBODY: CPT

## 2018-12-08 PROCEDURE — 86038 ANTINUCLEAR ANTIBODIES: CPT

## 2018-12-08 PROCEDURE — 73562 X-RAY EXAM OF KNEE 3: CPT

## 2018-12-08 PROCEDURE — 85652 RBC SED RATE AUTOMATED: CPT

## 2018-12-08 PROCEDURE — 86430 RHEUMATOID FACTOR TEST QUAL: CPT

## 2018-12-08 PROCEDURE — 36415 COLL VENOUS BLD VENIPUNCTURE: CPT

## 2018-12-09 LAB
B BURGDOR IGG SER IA-ACNC: 0.4
B BURGDOR IGM SER IA-ACNC: 0.39
RHEUMATOID FACT SER QL LA: NEGATIVE

## 2018-12-10 LAB — RYE IGE QN: NEGATIVE

## 2018-12-24 RX ORDER — CYANOCOBALAMIN 1000 UG/ML
1000 INJECTION INTRAMUSCULAR; SUBCUTANEOUS ONCE
Status: COMPLETED | OUTPATIENT
Start: 2018-12-26 | End: 2018-12-26

## 2018-12-26 ENCOUNTER — HOSPITAL ENCOUNTER (OUTPATIENT)
Dept: INFUSION CENTER | Facility: HOSPITAL | Age: 53
Discharge: HOME | End: 2018-12-26
Payer: COMMERCIAL

## 2018-12-26 PROCEDURE — 96372 THER/PROPH/DIAG INJ SC/IM: CPT

## 2018-12-26 RX ADMIN — CYANOCOBALAMIN 1000 MCG: 1000 INJECTION INTRAMUSCULAR; SUBCUTANEOUS at 08:31

## 2018-12-26 NOTE — PLAN OF CARE
Problem: Potential for Falls  Goal: Patient will remain free of falls  INTERVENTIONS:  - Assess patient frequently for physical needs  -  Identify cognitive and physical deficits and behaviors that affect risk of falls    -  [place] fall precautions as indicated by assessment   - Educate patient/family on patient safety including physical limitations  - Instruct patient to call for assistance with activity based on assessment  - Modify environment to reduce risk of injury  - Consider OT/PT consult to assist with strengthening/mobility   Outcome: Progressing

## 2018-12-26 NOTE — PROGRESS NOTES
Pt here for every 4 week Vit B12  Tolerated well to left deltoid without complications    Patient confirmed next appt and declined AVS

## 2019-01-22 RX ORDER — CYANOCOBALAMIN 1000 UG/ML
1000 INJECTION INTRAMUSCULAR; SUBCUTANEOUS ONCE
Status: DISCONTINUED | OUTPATIENT
Start: 2019-01-23 | End: 2019-01-27 | Stop reason: HOSPADM

## 2019-01-23 ENCOUNTER — HOSPITAL ENCOUNTER (OUTPATIENT)
Dept: INFUSION CENTER | Facility: HOSPITAL | Age: 54
Discharge: HOME | End: 2019-01-23

## 2019-01-28 RX ORDER — CYANOCOBALAMIN 1000 UG/ML
1000 INJECTION INTRAMUSCULAR; SUBCUTANEOUS ONCE
Status: COMPLETED | OUTPATIENT
Start: 2019-01-29 | End: 2019-01-29

## 2019-01-29 ENCOUNTER — HOSPITAL ENCOUNTER (OUTPATIENT)
Dept: INFUSION CENTER | Facility: HOSPITAL | Age: 54
Discharge: HOME | End: 2019-01-29
Payer: COMMERCIAL

## 2019-01-29 PROCEDURE — 96372 THER/PROPH/DIAG INJ SC/IM: CPT

## 2019-01-29 RX ADMIN — CYANOCOBALAMIN 1000 MCG: 1000 INJECTION INTRAMUSCULAR; SUBCUTANEOUS at 09:24

## 2019-02-19 RX ORDER — CYANOCOBALAMIN 1000 UG/ML
1000 INJECTION INTRAMUSCULAR; SUBCUTANEOUS ONCE
Status: COMPLETED | OUTPATIENT
Start: 2019-02-20 | End: 2019-02-20

## 2019-02-20 ENCOUNTER — HOSPITAL ENCOUNTER (OUTPATIENT)
Dept: INFUSION CENTER | Facility: HOSPITAL | Age: 54
Discharge: HOME | End: 2019-02-20
Payer: COMMERCIAL

## 2019-02-20 PROCEDURE — 96372 THER/PROPH/DIAG INJ SC/IM: CPT

## 2019-02-20 RX ADMIN — CYANOCOBALAMIN 1000 MCG: 1000 INJECTION INTRAMUSCULAR; SUBCUTANEOUS at 07:39

## 2019-02-20 NOTE — PLAN OF CARE
Problem: Potential for Falls  Goal: Patient will remain free of falls  Description  INTERVENTIONS:  - Assess patient frequently for physical needs  -  Identify cognitive and physical deficits and behaviors that affect risk of falls    -  [place] fall precautions as indicated by assessment   - Educate patient/family on patient safety including physical limitations  - Instruct patient to call for assistance with activity based on assessment  - Modify environment to reduce risk of injury  - Consider OT/PT consult to assist with strengthening/mobility  Outcome: Progressing

## 2019-03-09 ENCOUNTER — APPOINTMENT (OUTPATIENT)
Dept: LAB | Facility: HOSPITAL | Age: 54
End: 2019-03-09
Attending: INTERNAL MEDICINE
Payer: COMMERCIAL

## 2019-03-09 DIAGNOSIS — D64.9 ANEMIA, UNSPECIFIED TYPE: ICD-10-CM

## 2019-03-09 DIAGNOSIS — D69.6 THROMBOCYTOPENIA (HCC): ICD-10-CM

## 2019-03-09 LAB
ALBUMIN SERPL BCP-MCNC: 4.1 G/DL (ref 3–5.2)
ALP SERPL-CCNC: 88 U/L (ref 43–122)
ALT SERPL W P-5'-P-CCNC: 17 U/L (ref 9–52)
ANION GAP SERPL CALCULATED.3IONS-SCNC: 7 MMOL/L (ref 5–14)
AST SERPL W P-5'-P-CCNC: 19 U/L (ref 17–59)
BASOPHILS # BLD AUTO: 0.1 THOUSANDS/ΜL (ref 0–0.1)
BASOPHILS NFR BLD AUTO: 1 % (ref 0–1)
BILIRUB SERPL-MCNC: 0.5 MG/DL
BUN SERPL-MCNC: 16 MG/DL (ref 5–25)
CALCIUM SERPL-MCNC: 9.6 MG/DL (ref 8.4–10.2)
CHLORIDE SERPL-SCNC: 108 MMOL/L (ref 97–108)
CO2 SERPL-SCNC: 23 MMOL/L (ref 22–30)
CREAT SERPL-MCNC: 1.04 MG/DL (ref 0.7–1.5)
CRP SERPL QL: <3 MG/L
EOSINOPHIL # BLD AUTO: 0 THOUSAND/ΜL (ref 0–0.4)
EOSINOPHIL NFR BLD AUTO: 0 % (ref 0–6)
ERYTHROCYTE [DISTWIDTH] IN BLOOD BY AUTOMATED COUNT: 13.3 %
ERYTHROCYTE [SEDIMENTATION RATE] IN BLOOD: 14 MM/HOUR (ref 1–20)
FERRITIN SERPL-MCNC: 102 NG/ML (ref 8–388)
GFR SERPL CREATININE-BSD FRML MDRD: 82 ML/MIN/1.73SQ M
GLUCOSE P FAST SERPL-MCNC: 202 MG/DL (ref 70–99)
HCT VFR BLD AUTO: 41 % (ref 41–53)
HGB BLD-MCNC: 13.2 G/DL (ref 13.5–17.5)
IRON SATN MFR SERPL: 27 %
IRON SERPL-MCNC: 68 UG/DL (ref 65–175)
LYMPHOCYTES # BLD AUTO: 1.4 THOUSANDS/ΜL (ref 0.5–4)
LYMPHOCYTES NFR BLD AUTO: 28 % (ref 25–45)
MCH RBC QN AUTO: 29.3 PG (ref 26–34)
MCHC RBC AUTO-ENTMCNC: 32.1 G/DL (ref 31–36)
MCV RBC AUTO: 91 FL (ref 80–100)
MONOCYTES # BLD AUTO: 0.4 THOUSAND/ΜL (ref 0.2–0.9)
MONOCYTES NFR BLD AUTO: 9 % (ref 1–10)
NEUTROPHILS # BLD AUTO: 3.1 THOUSANDS/ΜL (ref 1.8–7.8)
NEUTS SEG NFR BLD AUTO: 63 % (ref 45–65)
PLATELET # BLD AUTO: 122 THOUSANDS/UL (ref 150–450)
PMV BLD AUTO: 8.9 FL (ref 8.9–12.7)
POTASSIUM SERPL-SCNC: 4.2 MMOL/L (ref 3.6–5)
PROT SERPL-MCNC: 6.8 G/DL (ref 5.9–8.4)
RBC # BLD AUTO: 4.5 MILLION/UL (ref 4.5–5.9)
SODIUM SERPL-SCNC: 138 MMOL/L (ref 137–147)
TIBC SERPL-MCNC: 251 UG/DL (ref 250–450)
VIT B12 SERPL-MCNC: 498 PG/ML (ref 100–900)
WBC # BLD AUTO: 4.9 THOUSAND/UL (ref 4.5–11)

## 2019-03-09 PROCEDURE — 85652 RBC SED RATE AUTOMATED: CPT

## 2019-03-09 PROCEDURE — 83540 ASSAY OF IRON: CPT

## 2019-03-09 PROCEDURE — 86140 C-REACTIVE PROTEIN: CPT

## 2019-03-09 PROCEDURE — 85025 COMPLETE CBC W/AUTO DIFF WBC: CPT

## 2019-03-09 PROCEDURE — 80053 COMPREHEN METABOLIC PANEL: CPT

## 2019-03-09 PROCEDURE — 82607 VITAMIN B-12: CPT

## 2019-03-09 PROCEDURE — 82728 ASSAY OF FERRITIN: CPT

## 2019-03-09 PROCEDURE — 83550 IRON BINDING TEST: CPT

## 2019-03-09 PROCEDURE — 36415 COLL VENOUS BLD VENIPUNCTURE: CPT

## 2019-03-13 ENCOUNTER — OFFICE VISIT (OUTPATIENT)
Dept: HEMATOLOGY ONCOLOGY | Facility: CLINIC | Age: 54
End: 2019-03-13
Payer: COMMERCIAL

## 2019-03-13 VITALS
DIASTOLIC BLOOD PRESSURE: 76 MMHG | WEIGHT: 207 LBS | HEIGHT: 72 IN | OXYGEN SATURATION: 97 % | TEMPERATURE: 97.9 F | BODY MASS INDEX: 28.04 KG/M2 | HEART RATE: 77 BPM | RESPIRATION RATE: 16 BRPM | SYSTOLIC BLOOD PRESSURE: 110 MMHG

## 2019-03-13 DIAGNOSIS — D64.9 ANEMIA, UNSPECIFIED TYPE: ICD-10-CM

## 2019-03-13 DIAGNOSIS — D69.6 THROMBOCYTOPENIA (HCC): Primary | ICD-10-CM

## 2019-03-13 PROCEDURE — 99214 OFFICE O/P EST MOD 30 MIN: CPT | Performed by: INTERNAL MEDICINE

## 2019-03-13 RX ORDER — PANTOPRAZOLE SODIUM 40 MG/1
40 TABLET, DELAYED RELEASE ORAL DAILY
COMMUNITY

## 2019-03-13 NOTE — PROGRESS NOTES
Hematology/Oncology Outpatient Follow-up  Milla Jeter 49 y o  male 1965 [id number]    Date:  3/13/2019    Assessment and Plan:  1  Thrombocytopenia ([address] )  The patient continues to have a chronic stable mild thrombocytopenia which is most likely related to his liver cirrhosis  He has no hint of any easy bruising or bleeding  We will continue to monitor him closely  For completeness sake we will check his alpha-fetoprotein level since he has liver cirrhosis before his next visit in 6 months from now  - CBC and differential; Future  - Comprehensive metabolic panel; Future  - C-reactive protein; Future  - Sedimentation rate, automated; Future  - AFP tumor marker; Future    2  Anemia, unspecified type  He has a low normal hemoglobin level which is also more likely related to the liver cirrhosis and now anemia of chronic disease  We will continue to monitor his hemoglobin level closely  - CBC and differential; Future  - Comprehensive metabolic panel; Future  - C-reactive protein; Future  - Sedimentation rate, automated; Future  - AFP tumor marker; Future      HPI:  The patient has multiple comorbid conditions including heart arrhythmia, type 2 diabetes mellitus, liver cirrhosis due to fatty liver, history of arthritis, or gout, hyperuricemia, hypothyroidism, schizoaffective disorder, anxiety, depression, etc     He had extensive workup for his mild chronic stable thrombocytopenia which showed goes back at least to 2015  He also was found to have vitamin B12 deficiency which was corrected with IM injections  The workup was also negative for any hint of monoclonal gammopathy  Interval history:  The patient came in today for a follow-up visit  He did complain about 1 episode of nausea vomiting and diarrhea about a week and a half  He feels better now    His most recent blood work on the 9th March 2019 continues to show mild stable chronic thrombocytopenia with a platelet count of a 266K with normal white cells and the low normal hemoglobin level of 13 2  He did complain about some mild fatigue she denies bleeding from any site  ROS: Review of Systems   Constitutional: Positive for appetite change and fatigue  Negative for diaphoresis and fever  HENT: Negative for congestion, dental problem, facial swelling, hearing loss, tinnitus and trouble swallowing  Eyes: Negative for visual disturbance  Respiratory: Positive for cough  Negative for chest tightness, shortness of breath and wheezing  Cardiovascular: Negative for chest pain and leg swelling  Gastrointestinal: Negative for abdominal distention, abdominal pain, blood in stool, constipation, diarrhea, nausea and vomiting  Genitourinary: Negative for dysuria, hematuria and urgency  Musculoskeletal: Negative for arthralgias, myalgias and neck pain  Skin: Negative  Negative for color change, pallor, rash and wound  Neurological: Positive for dizziness and headaches  Negative for weakness and numbness  Hematological: Negative for adenopathy  Psychiatric/Behavioral: Positive for sleep disturbance  Negative for agitation, behavioral problems, confusion, hallucinations and self-injury  The patient is not nervous/anxious and is not hyperactive  Past Medical History:   Diagnosis Date    Bipolar disorder ([place] [address] )     Diabetes mellitus, type II ([place] [address] )     PAT (paroxysmal atrial tachycardia) ([place] [address] )     Schizophrenic disorder ([place] [address] )        History reviewed  No pertinent surgical history      Social History     Socioeconomic History    Marital status: Single     Spouse name: None    Number of children: None    Years of education: None    Highest education level: None   Occupational History    None   Social Needs    Financial resource strain: None    Food insecurity:     Worry: None     Inability: None    Transportation needs:     Medical: None     Non-medical: None   Tobacco Use    Smoking status: Current Every Day Smoker    Smokeless tobacco: Never Used   Substance and Sexual Activity    Alcohol use: No    Drug use: No    Sexual activity: None   Lifestyle    Physical activity:     Days per week: None     Minutes per session: None    Stress: None   Relationships    Social connections:     Talks on phone: None     Gets together: None     Attends Jew service: None     Active member of club or organization: None     Attends meetings of clubs or organizations: None     Relationship status: None    Intimate partner violence:     Fear of current or ex partner: None     Emotionally abused: None     Physically abused: None     Forced sexual activity: None   Other Topics Concern    None   Social History Narrative    None       Family History   Problem Relation Age of Onset    Coronary artery disease Mother         premature       Allergies   Allergen Reactions    Bupropion     Chlorproethazine     Molindone     Paliperidone     Thiazide-Type Diuretics          Current Outpatient Medications:     ALPRAZolam (XANAX) 0 5 mg tablet, Take by mouth 3 (three) times a day, Disp: , Rfl:     cloZAPine (CLOZARIL) 100 mg tablet, Take 100 mg by mouth daily Takes one 200mg tab and one 100mg tab at bedtime = 300mg, Disp: , Rfl:     lithium 300 MG tablet, Take 300 mg by mouth 2 (two) times a day, Disp: , Rfl:     pantoprazole (PROTONIX) 40 mg tablet, Take 40 mg by mouth daily, Disp: , Rfl:       Physical Exam:  /76 (BP Location: Left arm, Cuff Size: Adult)   Pulse 77   Temp 97 9 °F (36 6 °C) (Tympanic)   Resp 16   Ht 6' (1 829 m)   Wt 93 9 kg (207 lb)   SpO2 97%   BMI 28 07 kg/m²     Physical Exam   Constitutional: He is oriented to person, place, and time  He appears well-developed and well-nourished  HENT:   Head: Normocephalic and atraumatic  Nose: Nose normal    Mouth/Throat: Oropharynx is clear and moist    Eyes: Pupils are equal, round, and reactive to light  Conjunctivae and EOM are normal  Right eye exhibits no discharge  Left eye exhibits no discharge  No scleral icterus  Neck: Normal range of motion  Neck supple  No tracheal deviation present  No thyromegaly present  Cardiovascular: Normal rate, regular rhythm and normal heart sounds  Exam reveals no friction rub  No murmur heard  Pulmonary/Chest: Effort normal and breath sounds normal  No respiratory distress  He has no wheezes  He has no rales  He exhibits no tenderness  Abdominal: Soft  Bowel sounds are normal  He exhibits no distension and no mass  There is no hepatosplenomegaly, splenomegaly or hepatomegaly  There is no tenderness  There is no rebound and no guarding  Musculoskeletal: Normal range of motion  He exhibits no edema, tenderness or deformity  Lymphadenopathy:     He has no cervical adenopathy  Neurological: He is alert and oriented to person, place, and time  He has normal reflexes  He displays normal reflexes  No cranial nerve deficit  Coordination normal    Skin: Skin is warm and dry  No rash noted  No erythema  No pallor  Psychiatric: He has a normal mood and affect  His behavior is normal  Judgment and thought content normal          Labs:  Lab Results   Component Value Date    WBC 4 90 03/09/2019    HGB 13 2 (L) 03/09/2019    HCT 41 0 03/09/2019    MCV 91 03/09/2019     (L) 03/09/2019     Lab Results   Component Value Date     04/17/2018    K 4 2 03/09/2019     03/09/2019    CO2 23 03/09/2019    ANIONGAP 10 04/17/2018    BUN 16 03/09/2019    CREATININE 1 04 03/09/2019    GLUCOSE 101 (H) 04/17/2018    GLUF 202 (H) 03/09/2019    CALCIUM 9 6 03/09/2019    AST 19 03/09/2019    ALT 17 03/09/2019    ALKPHOS 88 03/09/2019    PROT 6 6 04/17/2018    BILITOT 1 1 04/17/2018    EGFR 82 03/09/2019       Patient voiced understanding and agreement in the above discussion  Aware to contact our office with questions/symptoms in the interim

## 2019-03-19 RX ORDER — CYANOCOBALAMIN 1000 UG/ML
1000 INJECTION INTRAMUSCULAR; SUBCUTANEOUS ONCE
Status: COMPLETED | OUTPATIENT
Start: 2019-03-20 | End: 2019-03-20

## 2019-03-20 ENCOUNTER — HOSPITAL ENCOUNTER (OUTPATIENT)
Dept: INFUSION CENTER | Facility: HOSPITAL | Age: 54
Discharge: HOME | End: 2019-03-20
Payer: COMMERCIAL

## 2019-03-20 PROCEDURE — 96372 THER/PROPH/DIAG INJ SC/IM: CPT

## 2019-03-20 RX ADMIN — CYANOCOBALAMIN 1000 MCG: 1000 INJECTION INTRAMUSCULAR; SUBCUTANEOUS at 08:19

## 2019-04-13 ENCOUNTER — APPOINTMENT (OUTPATIENT)
Dept: LAB | Facility: HOSPITAL | Age: 54
End: 2019-04-13
Payer: COMMERCIAL

## 2019-04-13 ENCOUNTER — TRANSCRIBE ORDERS (OUTPATIENT)
Dept: ADMINISTRATIVE | Facility: HOSPITAL | Age: 54
End: 2019-04-13

## 2019-04-13 DIAGNOSIS — E78.5 HYPERLIPIDEMIA, UNSPECIFIED HYPERLIPIDEMIA TYPE: ICD-10-CM

## 2019-04-13 DIAGNOSIS — I10 BENIGN HYPERTENSION: ICD-10-CM

## 2019-04-13 DIAGNOSIS — E11.9 DIABETES MELLITUS WITHOUT COMPLICATION (HCC): Primary | ICD-10-CM

## 2019-04-13 DIAGNOSIS — E11.9 DIABETES MELLITUS WITHOUT COMPLICATION (HCC): ICD-10-CM

## 2019-04-13 DIAGNOSIS — Z79.899 ENCOUNTER FOR LONG-TERM (CURRENT) USE OF MEDICATIONS: ICD-10-CM

## 2019-04-13 DIAGNOSIS — Z79.899 ENCOUNTER FOR LONG-TERM (CURRENT) USE OF MEDICATIONS: Primary | ICD-10-CM

## 2019-04-13 LAB
ALBUMIN SERPL BCP-MCNC: 4.3 G/DL (ref 3–5.2)
ALP SERPL-CCNC: 84 U/L (ref 43–122)
ALT SERPL W P-5'-P-CCNC: 20 U/L (ref 9–52)
ANION GAP SERPL CALCULATED.3IONS-SCNC: 9 MMOL/L (ref 5–14)
AST SERPL W P-5'-P-CCNC: 24 U/L (ref 17–59)
BASOPHILS # BLD AUTO: 0 THOUSANDS/ΜL (ref 0–0.1)
BASOPHILS NFR BLD AUTO: 1 % (ref 0–1)
BILIRUB SERPL-MCNC: 0.7 MG/DL
BUN SERPL-MCNC: 14 MG/DL (ref 5–25)
CALCIUM SERPL-MCNC: 9.6 MG/DL (ref 8.4–10.2)
CHLORIDE SERPL-SCNC: 109 MMOL/L (ref 97–108)
CHOLEST SERPL-MCNC: 179 MG/DL
CO2 SERPL-SCNC: 25 MMOL/L (ref 22–30)
CREAT SERPL-MCNC: 1.08 MG/DL (ref 0.7–1.5)
EOSINOPHIL # BLD AUTO: 0 THOUSAND/ΜL (ref 0–0.4)
EOSINOPHIL NFR BLD AUTO: 0 % (ref 0–6)
ERYTHROCYTE [DISTWIDTH] IN BLOOD BY AUTOMATED COUNT: 13.3 %
EST. AVERAGE GLUCOSE BLD GHB EST-MCNC: 120 MG/DL
GFR SERPL CREATININE-BSD FRML MDRD: 78 ML/MIN/1.73SQ M
GLUCOSE P FAST SERPL-MCNC: 100 MG/DL (ref 70–99)
HBA1C MFR BLD: 5.8 % (ref 4.2–6.3)
HCT VFR BLD AUTO: 42.3 % (ref 41–53)
HDLC SERPL-MCNC: 45 MG/DL (ref 40–59)
HGB BLD-MCNC: 14.1 G/DL (ref 13.5–17.5)
LDLC SERPL CALC-MCNC: 121 MG/DL
LITHIUM SERPL-SCNC: 0.9 MMOL/L (ref 0.6–1.2)
LYMPHOCYTES # BLD AUTO: 1.6 THOUSANDS/ΜL (ref 0.5–4)
LYMPHOCYTES NFR BLD AUTO: 31 % (ref 25–45)
MCH RBC QN AUTO: 30.1 PG (ref 26–34)
MCHC RBC AUTO-ENTMCNC: 33.2 G/DL (ref 31–36)
MCV RBC AUTO: 91 FL (ref 80–100)
MONOCYTES # BLD AUTO: 0.4 THOUSAND/ΜL (ref 0.2–0.9)
MONOCYTES NFR BLD AUTO: 9 % (ref 1–10)
NEUTROPHILS # BLD AUTO: 2.9 THOUSANDS/ΜL (ref 1.8–7.8)
NEUTS SEG NFR BLD AUTO: 59 % (ref 45–65)
NONHDLC SERPL-MCNC: 134 MG/DL
PLATELET # BLD AUTO: 126 THOUSANDS/UL (ref 150–450)
PMV BLD AUTO: 8.7 FL (ref 8.9–12.7)
POTASSIUM SERPL-SCNC: 4.2 MMOL/L (ref 3.6–5)
PROT SERPL-MCNC: 6.9 G/DL (ref 5.9–8.4)
RBC # BLD AUTO: 4.68 MILLION/UL (ref 4.5–5.9)
SODIUM SERPL-SCNC: 143 MMOL/L (ref 137–147)
TRIGL SERPL-MCNC: 66 MG/DL
TSH SERPL DL<=0.05 MIU/L-ACNC: 3.61 UIU/ML (ref 0.47–4.68)
WBC # BLD AUTO: 5 THOUSAND/UL (ref 4.5–11)

## 2019-04-13 PROCEDURE — 36415 COLL VENOUS BLD VENIPUNCTURE: CPT

## 2019-04-13 PROCEDURE — 80178 ASSAY OF LITHIUM: CPT

## 2019-04-13 PROCEDURE — 80061 LIPID PANEL: CPT

## 2019-04-13 PROCEDURE — 85025 COMPLETE CBC W/AUTO DIFF WBC: CPT

## 2019-04-13 PROCEDURE — 80053 COMPREHEN METABOLIC PANEL: CPT

## 2019-04-13 PROCEDURE — 84443 ASSAY THYROID STIM HORMONE: CPT

## 2019-04-13 PROCEDURE — 83036 HEMOGLOBIN GLYCOSYLATED A1C: CPT

## 2019-04-16 RX ORDER — CYANOCOBALAMIN 1000 UG/ML
1000 INJECTION INTRAMUSCULAR; SUBCUTANEOUS ONCE
Status: COMPLETED | OUTPATIENT
Start: 2019-04-17 | End: 2019-04-17

## 2019-04-17 ENCOUNTER — HOSPITAL ENCOUNTER (OUTPATIENT)
Dept: INFUSION CENTER | Facility: HOSPITAL | Age: 54
Discharge: HOME | End: 2019-04-17
Payer: COMMERCIAL

## 2019-04-17 PROCEDURE — 96372 THER/PROPH/DIAG INJ SC/IM: CPT

## 2019-04-17 RX ADMIN — CYANOCOBALAMIN 1000 MCG: 1000 INJECTION INTRAMUSCULAR; SUBCUTANEOUS at 07:43

## 2019-04-17 NOTE — PLAN OF CARE
Problem: SAFETY ADULT  Goal: Patient will remain free of falls  Description  INTERVENTIONS:  - Assess patient frequently for physical needs  -  Identify cognitive and physical deficits and behaviors that affect risk of falls  -  [place] fall precautions as indicated by assessment   - Educate patient/family on patient safety including physical limitations  - Instruct patient to call for assistance with activity based on assessment  - Modify environment to reduce risk of injury  - Consider OT/PT consult to assist with strengthening/mobility  Outcome: Progressing     Problem: Knowledge Deficit  Goal: Patient/family/caregiver demonstrates understanding of disease process, treatment plan, medications, and discharge instructions  Description  Complete learning assessment and assess knowledge base    Interventions:  - Provide teaching at level of understanding  - Provide teaching via preferred learning methods  Outcome: Progressing

## 2019-04-18 DIAGNOSIS — D51.8 OTHER VITAMIN B12 DEFICIENCY ANEMIAS: ICD-10-CM

## 2019-04-18 RX ORDER — CYANOCOBALAMIN 1000 UG/ML
1000 INJECTION INTRAMUSCULAR; SUBCUTANEOUS ONCE
Status: CANCELLED | OUTPATIENT
Start: 2019-05-17

## 2019-05-15 ENCOUNTER — HOSPITAL ENCOUNTER (OUTPATIENT)
Dept: INFUSION CENTER | Facility: HOSPITAL | Age: 54
Discharge: HOME | End: 2019-05-15

## 2019-05-17 ENCOUNTER — HOSPITAL ENCOUNTER (OUTPATIENT)
Dept: INFUSION CENTER | Facility: HOSPITAL | Age: 54
Discharge: HOME | End: 2019-05-17
Payer: COMMERCIAL

## 2019-05-17 DIAGNOSIS — D51.8 OTHER VITAMIN B12 DEFICIENCY ANEMIAS: Primary | ICD-10-CM

## 2019-05-17 PROCEDURE — 96372 THER/PROPH/DIAG INJ SC/IM: CPT

## 2019-05-17 RX ORDER — CYANOCOBALAMIN 1000 UG/ML
1000 INJECTION INTRAMUSCULAR; SUBCUTANEOUS ONCE
Status: COMPLETED | OUTPATIENT
Start: 2019-05-17 | End: 2019-05-17

## 2019-05-17 RX ORDER — CYANOCOBALAMIN 1000 UG/ML
1000 INJECTION INTRAMUSCULAR; SUBCUTANEOUS ONCE
Status: CANCELLED | OUTPATIENT
Start: 2019-06-14

## 2019-05-17 RX ADMIN — CYANOCOBALAMIN 1000 MCG: 1000 INJECTION INTRAMUSCULAR; SUBCUTANEOUS at 07:54

## 2019-05-17 NOTE — PROGRESS NOTES
Patient tolerated injection, offers no complaints  AVS reviewed with and provided to patient  [facility] appt  confirmed for June

## 2019-06-12 ENCOUNTER — HOSPITAL ENCOUNTER (OUTPATIENT)
Dept: INFUSION CENTER | Facility: HOSPITAL | Age: 54
Discharge: HOME | End: 2019-06-12
Payer: COMMERCIAL

## 2019-06-12 DIAGNOSIS — D51.8 OTHER VITAMIN B12 DEFICIENCY ANEMIAS: Primary | ICD-10-CM

## 2019-06-12 PROCEDURE — 96372 THER/PROPH/DIAG INJ SC/IM: CPT

## 2019-06-12 RX ORDER — CYANOCOBALAMIN 1000 UG/ML
1000 INJECTION INTRAMUSCULAR; SUBCUTANEOUS ONCE
Status: CANCELLED | OUTPATIENT
Start: 2019-07-10

## 2019-06-12 RX ORDER — CYANOCOBALAMIN 1000 UG/ML
1000 INJECTION INTRAMUSCULAR; SUBCUTANEOUS ONCE
Status: COMPLETED | OUTPATIENT
Start: 2019-06-12 | End: 2019-06-12

## 2019-06-12 RX ADMIN — CYANOCOBALAMIN 1000 MCG: 1000 INJECTION INTRAMUSCULAR; SUBCUTANEOUS at 07:58

## 2019-07-09 RX ORDER — CYANOCOBALAMIN 1000 UG/ML
1000 INJECTION INTRAMUSCULAR; SUBCUTANEOUS ONCE
Status: CANCELLED | OUTPATIENT
Start: 2019-07-10

## 2019-07-10 ENCOUNTER — HOSPITAL ENCOUNTER (OUTPATIENT)
Dept: INFUSION CENTER | Facility: HOSPITAL | Age: 54
Discharge: HOME | End: 2019-07-10
Payer: COMMERCIAL

## 2019-07-10 DIAGNOSIS — D51.8 OTHER VITAMIN B12 DEFICIENCY ANEMIAS: Primary | ICD-10-CM

## 2019-07-10 PROCEDURE — 96372 THER/PROPH/DIAG INJ SC/IM: CPT

## 2019-07-10 RX ORDER — CYANOCOBALAMIN 1000 UG/ML
1000 INJECTION INTRAMUSCULAR; SUBCUTANEOUS ONCE
Status: COMPLETED | OUTPATIENT
Start: 2019-07-10 | End: 2019-07-10

## 2019-07-10 RX ORDER — CYANOCOBALAMIN 1000 UG/ML
1000 INJECTION INTRAMUSCULAR; SUBCUTANEOUS ONCE
Status: CANCELLED | OUTPATIENT
Start: 2019-08-07

## 2019-07-10 RX ADMIN — CYANOCOBALAMIN 1000 MCG: 1000 INJECTION INTRAMUSCULAR; SUBCUTANEOUS at 07:47

## 2019-07-10 NOTE — PROGRESS NOTES
Pt here for monthly Vit B12 injections  Tolerated well to right deltoid IM without complications  Confirmed next appt and AVS provided

## 2019-08-07 ENCOUNTER — HOSPITAL ENCOUNTER (OUTPATIENT)
Dept: INFUSION CENTER | Facility: HOSPITAL | Age: 54
Discharge: HOME | End: 2019-08-07
Attending: INTERNAL MEDICINE
Payer: COMMERCIAL

## 2019-08-07 DIAGNOSIS — D51.8 OTHER VITAMIN B12 DEFICIENCY ANEMIAS: Primary | ICD-10-CM

## 2019-08-07 PROCEDURE — 96372 THER/PROPH/DIAG INJ SC/IM: CPT

## 2019-08-07 RX ORDER — CYANOCOBALAMIN 1000 UG/ML
1000 INJECTION INTRAMUSCULAR; SUBCUTANEOUS ONCE
Status: COMPLETED | OUTPATIENT
Start: 2019-08-07 | End: 2019-08-07

## 2019-08-07 RX ORDER — CYANOCOBALAMIN 1000 UG/ML
1000 INJECTION INTRAMUSCULAR; SUBCUTANEOUS ONCE
Status: CANCELLED | OUTPATIENT
Start: 2019-09-04

## 2019-08-07 RX ADMIN — CYANOCOBALAMIN 1000 MCG: 1000 INJECTION INTRAMUSCULAR; SUBCUTANEOUS at 08:12

## 2019-08-07 NOTE — PROGRESS NOTES
Pt tolerated vitamin b12 injection well  No adverse reaction noted  Discharged ambulatory with avs and next appt 9/4/19  Rocky Gonzalez at [facility] aware for transport

## 2019-08-07 NOTE — PLAN OF CARE
Problem: Potential for Falls  Goal: Patient will remain free of falls  Description  INTERVENTIONS:  - Assess patient frequently for physical needs  -  Identify cognitive and physical deficits and behaviors that affect risk of falls    -  [place] fall precautions as indicated by assessment   - Educate patient/family on patient safety including physical limitations  - Instruct patient to call for assistance with activity based on assessment  - Modify environment to reduce risk of injury  - Consider OT/PT consult to assist with strengthening/mobility  Outcome: Progressing

## 2019-08-24 ENCOUNTER — TRANSCRIBE ORDERS (OUTPATIENT)
Dept: ADMINISTRATIVE | Facility: HOSPITAL | Age: 54
End: 2019-08-24

## 2019-08-24 ENCOUNTER — APPOINTMENT (OUTPATIENT)
Dept: LAB | Facility: HOSPITAL | Age: 54
End: 2019-08-24
Payer: COMMERCIAL

## 2019-08-24 DIAGNOSIS — I10 ESSENTIAL HYPERTENSION, BENIGN: ICD-10-CM

## 2019-08-24 DIAGNOSIS — E11.9 DIABETES MELLITUS WITHOUT COMPLICATION (HCC): ICD-10-CM

## 2019-08-24 DIAGNOSIS — E78.5 HYPERLIPIDEMIA, UNSPECIFIED HYPERLIPIDEMIA TYPE: ICD-10-CM

## 2019-08-24 DIAGNOSIS — E11.9 DIABETES MELLITUS WITHOUT COMPLICATION (HCC): Primary | ICD-10-CM

## 2019-08-24 LAB
ALBUMIN SERPL BCP-MCNC: 4.3 G/DL (ref 3–5.2)
ALP SERPL-CCNC: 92 U/L (ref 43–122)
ALT SERPL W P-5'-P-CCNC: 24 U/L (ref 9–52)
ANION GAP SERPL CALCULATED.3IONS-SCNC: 8 MMOL/L (ref 5–14)
AST SERPL W P-5'-P-CCNC: 22 U/L (ref 17–59)
BASOPHILS # BLD AUTO: 0 THOUSANDS/ΜL (ref 0–0.1)
BASOPHILS NFR BLD AUTO: 1 % (ref 0–1)
BILIRUB SERPL-MCNC: 1 MG/DL
BUN SERPL-MCNC: 14 MG/DL (ref 5–25)
CALCIUM SERPL-MCNC: 9.6 MG/DL (ref 8.4–10.2)
CHLORIDE SERPL-SCNC: 109 MMOL/L (ref 97–108)
CHOLEST SERPL-MCNC: 187 MG/DL
CO2 SERPL-SCNC: 26 MMOL/L (ref 22–30)
CREAT SERPL-MCNC: 1.12 MG/DL (ref 0.7–1.5)
EOSINOPHIL # BLD AUTO: 0 THOUSAND/ΜL (ref 0–0.4)
EOSINOPHIL NFR BLD AUTO: 0 % (ref 0–6)
ERYTHROCYTE [DISTWIDTH] IN BLOOD BY AUTOMATED COUNT: 13.2 %
EST. AVERAGE GLUCOSE BLD GHB EST-MCNC: 111 MG/DL
GFR SERPL CREATININE-BSD FRML MDRD: 75 ML/MIN/1.73SQ M
GLUCOSE P FAST SERPL-MCNC: 83 MG/DL (ref 70–99)
HBA1C MFR BLD: 5.5 % (ref 4.2–6.3)
HCT VFR BLD AUTO: 40.8 % (ref 41–53)
HDLC SERPL-MCNC: 35 MG/DL (ref 40–59)
HGB BLD-MCNC: 13.4 G/DL (ref 13.5–17.5)
LDLC SERPL CALC-MCNC: 124 MG/DL
LYMPHOCYTES # BLD AUTO: 1.2 THOUSANDS/ΜL (ref 0.5–4)
LYMPHOCYTES NFR BLD AUTO: 22 % (ref 25–45)
MCH RBC QN AUTO: 29.6 PG (ref 26–34)
MCHC RBC AUTO-ENTMCNC: 32.8 G/DL (ref 31–36)
MCV RBC AUTO: 90 FL (ref 80–100)
MONOCYTES # BLD AUTO: 0.5 THOUSAND/ΜL (ref 0.2–0.9)
MONOCYTES NFR BLD AUTO: 10 % (ref 1–10)
NEUTROPHILS # BLD AUTO: 3.5 THOUSANDS/ΜL (ref 1.8–7.8)
NEUTS SEG NFR BLD AUTO: 67 % (ref 45–65)
NONHDLC SERPL-MCNC: 152 MG/DL
PLATELET # BLD AUTO: 124 THOUSANDS/UL (ref 150–450)
PMV BLD AUTO: 8.5 FL (ref 8.9–12.7)
POTASSIUM SERPL-SCNC: 4 MMOL/L (ref 3.6–5)
PROT SERPL-MCNC: 7.3 G/DL (ref 5.9–8.4)
RBC # BLD AUTO: 4.53 MILLION/UL (ref 4.5–5.9)
SODIUM SERPL-SCNC: 143 MMOL/L (ref 137–147)
TRIGL SERPL-MCNC: 138 MG/DL
WBC # BLD AUTO: 5.2 THOUSAND/UL (ref 4.5–11)

## 2019-08-24 PROCEDURE — 80061 LIPID PANEL: CPT

## 2019-08-24 PROCEDURE — 36415 COLL VENOUS BLD VENIPUNCTURE: CPT

## 2019-08-24 PROCEDURE — 83036 HEMOGLOBIN GLYCOSYLATED A1C: CPT

## 2019-08-24 PROCEDURE — 85025 COMPLETE CBC W/AUTO DIFF WBC: CPT

## 2019-08-24 PROCEDURE — 80053 COMPREHEN METABOLIC PANEL: CPT

## 2019-09-04 ENCOUNTER — HOSPITAL ENCOUNTER (OUTPATIENT)
Dept: INFUSION CENTER | Facility: HOSPITAL | Age: 54
Discharge: HOME | End: 2019-09-04
Attending: INTERNAL MEDICINE
Payer: COMMERCIAL

## 2019-09-04 DIAGNOSIS — D51.8 OTHER VITAMIN B12 DEFICIENCY ANEMIAS: Primary | ICD-10-CM

## 2019-09-04 PROCEDURE — 96372 THER/PROPH/DIAG INJ SC/IM: CPT

## 2019-09-04 RX ORDER — CYANOCOBALAMIN 1000 UG/ML
1000 INJECTION INTRAMUSCULAR; SUBCUTANEOUS ONCE
Status: COMPLETED | OUTPATIENT
Start: 2019-09-04 | End: 2019-09-04

## 2019-09-04 RX ORDER — CYANOCOBALAMIN 1000 UG/ML
1000 INJECTION INTRAMUSCULAR; SUBCUTANEOUS ONCE
Status: CANCELLED | OUTPATIENT
Start: 2019-09-04

## 2019-09-04 RX ADMIN — CYANOCOBALAMIN 1000 MCG: 1000 INJECTION INTRAMUSCULAR; SUBCUTANEOUS at 08:07

## 2019-09-04 NOTE — PROGRESS NOTES
Pt tolerated injection well  Reviewed next appt schedule   Discharged to [facility] with avs

## 2019-09-07 ENCOUNTER — APPOINTMENT (OUTPATIENT)
Dept: LAB | Facility: HOSPITAL | Age: 54
End: 2019-09-07
Attending: INTERNAL MEDICINE
Payer: COMMERCIAL

## 2019-09-07 DIAGNOSIS — D64.9 ANEMIA, UNSPECIFIED TYPE: ICD-10-CM

## 2019-09-07 DIAGNOSIS — D69.6 THROMBOCYTOPENIA (HCC): ICD-10-CM

## 2019-09-07 LAB
AFP-TM SERPL-MCNC: 1.2 NG/ML (ref 0.5–8)
ALBUMIN SERPL BCP-MCNC: 4.2 G/DL (ref 3–5.2)
ALP SERPL-CCNC: 80 U/L (ref 43–122)
ALT SERPL W P-5'-P-CCNC: 19 U/L (ref 9–52)
ANION GAP SERPL CALCULATED.3IONS-SCNC: 6 MMOL/L (ref 5–14)
AST SERPL W P-5'-P-CCNC: 20 U/L (ref 17–59)
BASOPHILS # BLD AUTO: 0 THOUSANDS/ΜL (ref 0–0.1)
BASOPHILS NFR BLD AUTO: 1 % (ref 0–1)
BILIRUB SERPL-MCNC: 0.6 MG/DL
BUN SERPL-MCNC: 12 MG/DL (ref 5–25)
CALCIUM SERPL-MCNC: 9.6 MG/DL (ref 8.4–10.2)
CHLORIDE SERPL-SCNC: 110 MMOL/L (ref 97–108)
CO2 SERPL-SCNC: 26 MMOL/L (ref 22–30)
CREAT SERPL-MCNC: 1.03 MG/DL (ref 0.7–1.5)
CRP SERPL QL: <5 MG/L
EOSINOPHIL # BLD AUTO: 0 THOUSAND/ΜL (ref 0–0.4)
EOSINOPHIL NFR BLD AUTO: 0 % (ref 0–6)
ERYTHROCYTE [DISTWIDTH] IN BLOOD BY AUTOMATED COUNT: 12.8 %
ERYTHROCYTE [SEDIMENTATION RATE] IN BLOOD: 15 MM/HOUR (ref 1–20)
GFR SERPL CREATININE-BSD FRML MDRD: 83 ML/MIN/1.73SQ M
GLUCOSE P FAST SERPL-MCNC: 107 MG/DL (ref 70–99)
HCT VFR BLD AUTO: 40.3 % (ref 41–53)
HGB BLD-MCNC: 13.5 G/DL (ref 13.5–17.5)
LYMPHOCYTES # BLD AUTO: 1.2 THOUSANDS/ΜL (ref 0.5–4)
LYMPHOCYTES NFR BLD AUTO: 25 % (ref 25–45)
MCH RBC QN AUTO: 30.1 PG (ref 26–34)
MCHC RBC AUTO-ENTMCNC: 33.5 G/DL (ref 31–36)
MCV RBC AUTO: 90 FL (ref 80–100)
MONOCYTES # BLD AUTO: 0.4 THOUSAND/ΜL (ref 0.2–0.9)
MONOCYTES NFR BLD AUTO: 9 % (ref 1–10)
NEUTROPHILS # BLD AUTO: 3.3 THOUSANDS/ΜL (ref 1.8–7.8)
NEUTS SEG NFR BLD AUTO: 66 % (ref 45–65)
PLATELET # BLD AUTO: 121 THOUSANDS/UL (ref 150–450)
PMV BLD AUTO: 8.3 FL (ref 8.9–12.7)
POTASSIUM SERPL-SCNC: 4.2 MMOL/L (ref 3.6–5)
PROT SERPL-MCNC: 7.1 G/DL (ref 5.9–8.4)
RBC # BLD AUTO: 4.49 MILLION/UL (ref 4.5–5.9)
SODIUM SERPL-SCNC: 142 MMOL/L (ref 137–147)
WBC # BLD AUTO: 5 THOUSAND/UL (ref 4.5–11)

## 2019-09-07 PROCEDURE — 36415 COLL VENOUS BLD VENIPUNCTURE: CPT

## 2019-09-07 PROCEDURE — 82105 ALPHA-FETOPROTEIN SERUM: CPT

## 2019-09-07 PROCEDURE — 85025 COMPLETE CBC W/AUTO DIFF WBC: CPT

## 2019-09-07 PROCEDURE — 86140 C-REACTIVE PROTEIN: CPT

## 2019-09-07 PROCEDURE — 80053 COMPREHEN METABOLIC PANEL: CPT

## 2019-09-07 PROCEDURE — 85652 RBC SED RATE AUTOMATED: CPT

## 2019-10-02 ENCOUNTER — HOSPITAL ENCOUNTER (OUTPATIENT)
Dept: INFUSION CENTER | Facility: HOSPITAL | Age: 54
Discharge: HOME | End: 2019-10-02
Attending: INTERNAL MEDICINE
Payer: COMMERCIAL

## 2019-10-02 DIAGNOSIS — D51.8 OTHER VITAMIN B12 DEFICIENCY ANEMIAS: Primary | ICD-10-CM

## 2019-10-02 PROCEDURE — 96372 THER/PROPH/DIAG INJ SC/IM: CPT

## 2019-10-02 RX ORDER — CYANOCOBALAMIN 1000 UG/ML
1000 INJECTION INTRAMUSCULAR; SUBCUTANEOUS ONCE
Status: COMPLETED | OUTPATIENT
Start: 2019-10-02 | End: 2019-10-02

## 2019-10-02 RX ORDER — CYANOCOBALAMIN 1000 UG/ML
1000 INJECTION INTRAMUSCULAR; SUBCUTANEOUS ONCE
Status: CANCELLED | OUTPATIENT
Start: 2019-10-30

## 2019-10-02 RX ADMIN — CYANOCOBALAMIN 1000 MCG: 1000 INJECTION INTRAMUSCULAR; SUBCUTANEOUS at 08:00

## 2019-10-02 NOTE — PLAN OF CARE
Problem: Potential for Falls  Goal: Patient will remain free of falls  Description  INTERVENTIONS:  - Assess patient frequently for physical needs  -  Identify cognitive and physical deficits and behaviors that affect risk of falls    -  [place] fall precautions as indicated by assessment   - Educate patient/family on patient safety including physical limitations  - Instruct patient to call for assistance with activity based on assessment  - Modify environment to reduce risk of injury  - Consider OT/PT consult to assist with strengthening/mobility  Outcome: Progressing

## 2019-10-09 ENCOUNTER — OFFICE VISIT (OUTPATIENT)
Dept: HEMATOLOGY ONCOLOGY | Facility: CLINIC | Age: 54
End: 2019-10-09
Payer: COMMERCIAL

## 2019-10-09 VITALS
TEMPERATURE: 98.5 F | HEIGHT: 72 IN | BODY MASS INDEX: 27.22 KG/M2 | OXYGEN SATURATION: 96 % | DIASTOLIC BLOOD PRESSURE: 70 MMHG | WEIGHT: 201 LBS | HEART RATE: 74 BPM | RESPIRATION RATE: 16 BRPM | SYSTOLIC BLOOD PRESSURE: 102 MMHG

## 2019-10-09 DIAGNOSIS — D51.8 OTHER VITAMIN B12 DEFICIENCY ANEMIAS: ICD-10-CM

## 2019-10-09 DIAGNOSIS — D64.9 ANEMIA, UNSPECIFIED TYPE: ICD-10-CM

## 2019-10-09 DIAGNOSIS — D69.6 THROMBOCYTOPENIA (HCC): Primary | ICD-10-CM

## 2019-10-09 PROCEDURE — 99213 OFFICE O/P EST LOW 20 MIN: CPT | Performed by: NURSE PRACTITIONER

## 2019-10-09 NOTE — PROGRESS NOTES
Hematology/Oncology Outpatient Follow-up  Mckenna Martin 49 y o  male 1965 [id number]    Date:  10/9/2019      Assessment and Plan:  1  Thrombocytopenia ([address] )  Patient continues to have a chronic mild stable thrombocytopenia which is most likely related to his well known history of liver cirrhosis  We will continue to monitor patient's laboratory studies closely  He will be back for follow-up in about 6 months with repeat labs prior  The patient mentions that he has not been seen by his hepatology team [facility] in about 2 years, he was advised to call and make a follow-up appointment  He states that he has their telephone number at home     - CBC and differential; Future  - Comprehensive metabolic panel; Future  - LD,Blood; Future    2  Other vitamin B12 deficiency anemias  Patient will be continued on his monthly vitamin B12 injections recent B12 level was not checked but his most recent 1 from March was in the normal range  We will recheck his B12 level with his next lab draw  - Vitamin B12; Future    3  Anemia, unspecified type  The patient is not currently anemic with a hemoglobin of 13 4  We will continue to monitor his laboratory studies     - CBC and differential; Future  - Comprehensive metabolic panel; Future  - LD,Blood; Future  - Iron Panel (Includes Ferritin, Iron Sat%, Iron, and TIBC); Future  - Ferritin; Future  - Vitamin B12; Future  - C-reactive protein; Future  - Sedimentation rate, automated; Future      HPI:  The patient has multiple comorbid conditions including heart arrhythmia, type 2 diabetes mellitus, liver cirrhosis due to fatty liver, history of arthritis, or gout, hyperuricemia, hypothyroidism, schizoaffective disorder, anxiety, depression, etc      He had extensive workup for his mild chronic stable thrombocytopenia which goes back at least to 2015  He also was found to have vitamin B12 deficiency which was corrected with monthly vitamin B12 injections    The workup was also negative for any hint of monoclonal gammopathy           Interval history:  The patient presents today for six-month follow-up visit  He is doing well and has no new complaints  Denies any easy bruising or bleeding from any site  He continues to get his monthly vitamin B12 injection in the infusion center and will be due again at the end of the month  His most recent laboratory studies from the 3rd of October showed WBC 4 7 with normal differential, he is not anemic H&H 13 4/40 3 continues to have mild stable chronic thrombocytopenia with a platelet count of a 852341  Glucose was 124, calcium 8 4 remaining metabolic panel seems to be within normal limits for the patient  He had alpha fetoprotein and checked 09/07/2019 his the a history liver cirrhosis which came back normal 1 2     ROS: Review of Systems   Constitutional: Positive for fatigue (Mild)  Negative for activity change, appetite change, chills, fever and unexpected weight change  HENT: Positive for trouble swallowing ( minimal)  Negative for congestion, mouth sores, nosebleeds and sore throat  Eyes: Negative  Respiratory: Positive for cough ( mild)  Negative for chest tightness and shortness of breath  Smoker   Cardiovascular: Negative for chest pain, palpitations and leg swelling  Gastrointestinal: Positive for abdominal distention and abdominal pain (lower abdomen since hernia surgery)  Negative for blood in stool, constipation, diarrhea, nausea and vomiting  Genitourinary: Negative for difficulty urinating, dysuria, frequency, hematuria and urgency  Musculoskeletal: Positive for arthralgias (2/10 generalized), gait problem ( ambulates with a cane) and myalgias (2/10)  Negative for back pain and joint swelling  Skin: Negative for color change, pallor and rash  Neurological: Positive for headaches  Negative for dizziness, weakness, light-headedness and numbness  Hematological: Negative for adenopathy   Does not bruise/bleed easily  Psychiatric/Behavioral: Positive for dysphoric mood (mild) and sleep disturbance ( occasional)  Past Medical History:   Diagnosis Date    Bipolar disorder ([place] [address] )     Diabetes mellitus, type II ([place] [address] )     PAT (paroxysmal atrial tachycardia) ([place] [address] )     Schizophrenic disorder ([place] [address] )        No past surgical history on file      Social History     Socioeconomic History    Marital status: Single     Spouse name: Not on file    Number of children: Not on file    Years of education: Not on file    Highest education level: Not on file   Occupational History    Not on file   Social Needs    Financial resource strain: Not on file    Food insecurity:     Worry: Not on file     Inability: Not on file    Transportation needs:     Medical: Not on file     Non-medical: Not on file   Tobacco Use    Smoking status: Current Every Day Smoker    Smokeless tobacco: Never Used   Substance and Sexual Activity    Alcohol use: No    Drug use: No    Sexual activity: Not on file   Lifestyle    Physical activity:     Days per week: Not on file     Minutes per session: Not on file    Stress: Not on file   Relationships    Social connections:     Talks on phone: Not on file     Gets together: Not on file     Attends Holiness service: Not on file     Active member of club or organization: Not on file     Attends meetings of clubs or organizations: Not on file     Relationship status: Not on file    Intimate partner violence:     Fear of current or ex partner: Not on file     Emotionally abused: Not on file     Physically abused: Not on file     Forced sexual activity: Not on file   Other Topics Concern    Not on file   Social History Narrative    Not on file       Family History   Problem Relation Age of Onset    Coronary artery disease Mother         premature       Allergies   Allergen Reactions    Bupropion     Chlorproethazine     Molindone     Paliperidone     Thiazide-Type Diuretics          Current Outpatient Medications:     ALPRAZolam (XANAX) 0 5 mg tablet, Take by mouth 3 (three) times a day, Disp: , Rfl:     cloZAPine (CLOZARIL) 100 mg tablet, Take 100 mg by mouth daily Takes one 200mg tab and one 100mg tab at bedtime = 300mg, Disp: , Rfl:     lithium 300 MG tablet, Take 300 mg by mouth 2 (two) times a day, Disp: , Rfl:     metFORMIN (GLUCOPHAGE) 500 mg tablet, Take 500 mg by mouth daily with breakfast, Disp: , Rfl:     pantoprazole (PROTONIX) 40 mg tablet, Take 40 mg by mouth daily, Disp: , Rfl:       Physical Exam:  /70 (BP Location: Left arm)   Pulse 74   Temp 98 5 °F (36 9 °C) (Oral)   Resp 16   Ht 6' (1 829 m)   Wt 91 2 kg (201 lb)   SpO2 96%   BMI 27 26 kg/m²     Physical Exam   Constitutional: He is oriented to person, place, and time  He appears well-developed and well-nourished  No distress  HENT:   Head: Normocephalic and atraumatic  Mouth/Throat: Oropharynx is clear and moist  No oropharyngeal exudate  Eyes: Pupils are equal, round, and reactive to light  Conjunctivae are normal  No scleral icterus  Neck: Normal range of motion  Neck supple  No thyromegaly present  Cardiovascular: Normal rate, regular rhythm, normal heart sounds and intact distal pulses  No murmur heard  Pulmonary/Chest: Effort normal and breath sounds normal  No respiratory distress  Abdominal: Soft  Bowel sounds are normal  He exhibits distension  There is hepatomegaly  There is no splenomegaly  There is tenderness in the right lower quadrant and left lower quadrant  A hernia is present  Musculoskeletal: Normal range of motion  He exhibits no edema  Lymphadenopathy:     He has no cervical adenopathy  Neurological: He is alert and oriented to person, place, and time  Skin: Skin is warm and dry  No rash noted  He is not diaphoretic  No erythema  No pallor  Psychiatric: His behavior is normal  Judgment and thought content normal  His affect is blunt  Vitals reviewed          Labs:  Lab Results   Component Value Date    WBC 5 00 09/07/2019    HGB 13 5 09/07/2019    HCT 40 3 (L) 09/07/2019    MCV 90 09/07/2019     (L) 09/07/2019     Lab Results   Component Value Date     04/17/2018    K 4 2 09/07/2019     (H) 09/07/2019    CO2 26 09/07/2019    ANIONGAP 10 04/17/2018    BUN 12 09/07/2019    CREATININE 1 03 09/07/2019    GLUCOSE 101 (H) 04/17/2018    GLUF 107 (H) 09/07/2019    CALCIUM 9 6 09/07/2019    AST 20 09/07/2019    ALT 19 09/07/2019    ALKPHOS 80 09/07/2019    PROT 6 6 04/17/2018    BILITOT 1 1 04/17/2018    EGFR 83 09/07/2019       Patient voiced understanding and agreement in the above discussion  Aware to contact our office with questions/symptoms in the interim  This note has been generated by voice recognition software system  Therefore, there may be spelling, grammar, and or syntax errors  Please contact if questions arise

## 2019-10-30 ENCOUNTER — HOSPITAL ENCOUNTER (OUTPATIENT)
Dept: INFUSION CENTER | Facility: HOSPITAL | Age: 54
Discharge: HOME | End: 2019-10-30
Attending: INTERNAL MEDICINE
Payer: COMMERCIAL

## 2019-10-30 DIAGNOSIS — D51.8 OTHER VITAMIN B12 DEFICIENCY ANEMIAS: Primary | ICD-10-CM

## 2019-10-30 PROCEDURE — 96372 THER/PROPH/DIAG INJ SC/IM: CPT

## 2019-10-30 RX ORDER — CYANOCOBALAMIN 1000 UG/ML
1000 INJECTION INTRAMUSCULAR; SUBCUTANEOUS ONCE
Status: COMPLETED | OUTPATIENT
Start: 2019-10-30 | End: 2019-10-30

## 2019-10-30 RX ORDER — CYANOCOBALAMIN 1000 UG/ML
1000 INJECTION INTRAMUSCULAR; SUBCUTANEOUS ONCE
Status: CANCELLED | OUTPATIENT
Start: 2019-11-27

## 2019-10-30 RX ADMIN — CYANOCOBALAMIN 1000 MCG: 1000 INJECTION INTRAMUSCULAR; SUBCUTANEOUS at 10:34

## 2019-10-30 NOTE — PROGRESS NOTES
Pt tolerated vitamin b12 injetion to left deltoid  Discharged ambulatory with avs  Next appt scheduled 11/27/19

## 2019-11-16 ENCOUNTER — HOSPITAL ENCOUNTER (OUTPATIENT)
Dept: RADIOLOGY | Facility: HOSPITAL | Age: 54
Discharge: HOME | End: 2019-11-16
Payer: COMMERCIAL

## 2019-11-16 ENCOUNTER — APPOINTMENT (OUTPATIENT)
Dept: LAB | Facility: HOSPITAL | Age: 54
End: 2019-11-16
Payer: COMMERCIAL

## 2019-11-16 ENCOUNTER — TRANSCRIBE ORDERS (OUTPATIENT)
Dept: ADMINISTRATIVE | Facility: HOSPITAL | Age: 54
End: 2019-11-16

## 2019-11-16 DIAGNOSIS — M79.671 RIGHT FOOT PAIN: ICD-10-CM

## 2019-11-16 DIAGNOSIS — D64.9 ANEMIA, UNSPECIFIED TYPE: ICD-10-CM

## 2019-11-16 DIAGNOSIS — E11.9 DIABETES MELLITUS WITHOUT COMPLICATION (HCC): ICD-10-CM

## 2019-11-16 DIAGNOSIS — D69.6 THROMBOCYTOPENIA, UNSPECIFIED (HCC): ICD-10-CM

## 2019-11-16 DIAGNOSIS — M79.671 RIGHT FOOT PAIN: Primary | ICD-10-CM

## 2019-11-16 LAB
ALBUMIN SERPL BCP-MCNC: 4.2 G/DL (ref 3–5.2)
ALP SERPL-CCNC: 75 U/L (ref 43–122)
ALT SERPL W P-5'-P-CCNC: 30 U/L (ref 9–52)
ANION GAP SERPL CALCULATED.3IONS-SCNC: 5 MMOL/L (ref 5–14)
AST SERPL W P-5'-P-CCNC: 22 U/L (ref 17–59)
BILIRUB SERPL-MCNC: 0.9 MG/DL
BUN SERPL-MCNC: 13 MG/DL (ref 5–25)
CALCIUM SERPL-MCNC: 9.6 MG/DL (ref 8.4–10.2)
CHLORIDE SERPL-SCNC: 108 MMOL/L (ref 97–108)
CO2 SERPL-SCNC: 29 MMOL/L (ref 22–30)
CREAT SERPL-MCNC: 1.11 MG/DL (ref 0.7–1.5)
ERYTHROCYTE [DISTWIDTH] IN BLOOD BY AUTOMATED COUNT: 13.2 %
EST. AVERAGE GLUCOSE BLD GHB EST-MCNC: 123 MG/DL
GFR SERPL CREATININE-BSD FRML MDRD: 75 ML/MIN/1.73SQ M
GLUCOSE P FAST SERPL-MCNC: 139 MG/DL (ref 70–99)
HBA1C MFR BLD: 5.9 % (ref 4.2–6.3)
HCT VFR BLD AUTO: 40.3 % (ref 41–53)
HGB BLD-MCNC: 13.5 G/DL (ref 13.5–17.5)
MCH RBC QN AUTO: 30 PG (ref 26–34)
MCHC RBC AUTO-ENTMCNC: 33.6 G/DL (ref 31–36)
MCV RBC AUTO: 89 FL (ref 80–100)
PLATELET # BLD AUTO: 128 THOUSANDS/UL (ref 150–450)
PMV BLD AUTO: 8.6 FL (ref 8.9–12.7)
POTASSIUM SERPL-SCNC: 4.3 MMOL/L (ref 3.6–5)
PROT SERPL-MCNC: 7.1 G/DL (ref 5.9–8.4)
RBC # BLD AUTO: 4.51 MILLION/UL (ref 4.5–5.9)
SODIUM SERPL-SCNC: 142 MMOL/L (ref 137–147)
URATE SERPL-MCNC: 9.1 MG/DL (ref 3.5–8.5)
WBC # BLD AUTO: 6.3 THOUSAND/UL (ref 4.5–11)

## 2019-11-16 PROCEDURE — 83036 HEMOGLOBIN GLYCOSYLATED A1C: CPT | Performed by: FAMILY MEDICINE

## 2019-11-16 PROCEDURE — 85027 COMPLETE CBC AUTOMATED: CPT

## 2019-11-16 PROCEDURE — 80053 COMPREHEN METABOLIC PANEL: CPT

## 2019-11-16 PROCEDURE — 84550 ASSAY OF BLOOD/URIC ACID: CPT

## 2019-11-16 PROCEDURE — 36415 COLL VENOUS BLD VENIPUNCTURE: CPT | Performed by: FAMILY MEDICINE

## 2019-11-16 PROCEDURE — 73630 X-RAY EXAM OF FOOT: CPT

## 2019-12-02 ENCOUNTER — HOSPITAL ENCOUNTER (OUTPATIENT)
Dept: INFUSION CENTER | Facility: HOSPITAL | Age: 54
Discharge: HOME | End: 2019-12-02
Attending: INTERNAL MEDICINE

## 2019-12-05 RX ORDER — CYANOCOBALAMIN 1000 UG/ML
1000 INJECTION INTRAMUSCULAR; SUBCUTANEOUS ONCE
Status: CANCELLED | OUTPATIENT
Start: 2019-12-09

## 2019-12-09 ENCOUNTER — HOSPITAL ENCOUNTER (OUTPATIENT)
Dept: INFUSION CENTER | Facility: HOSPITAL | Age: 54
Discharge: HOME | End: 2019-12-09
Attending: INTERNAL MEDICINE
Payer: COMMERCIAL

## 2019-12-09 DIAGNOSIS — D51.8 OTHER VITAMIN B12 DEFICIENCY ANEMIAS: Primary | ICD-10-CM

## 2019-12-09 PROCEDURE — 96372 THER/PROPH/DIAG INJ SC/IM: CPT

## 2019-12-09 RX ORDER — CYANOCOBALAMIN 1000 UG/ML
1000 INJECTION INTRAMUSCULAR; SUBCUTANEOUS ONCE
Status: COMPLETED | OUTPATIENT
Start: 2019-12-09 | End: 2019-12-09

## 2019-12-09 RX ORDER — CYANOCOBALAMIN 1000 UG/ML
1000 INJECTION INTRAMUSCULAR; SUBCUTANEOUS ONCE
Status: CANCELLED | OUTPATIENT
Start: 2020-01-06

## 2019-12-09 RX ADMIN — CYANOCOBALAMIN 1000 MCG: 1000 INJECTION INTRAMUSCULAR; SUBCUTANEOUS at 08:13

## 2019-12-09 NOTE — PROGRESS NOTES
Pt tolerated vitamin b12 injection to left deltoid well  Next appt scheduled  Discharged ambulatory to meet star

## 2020-01-06 ENCOUNTER — HOSPITAL ENCOUNTER (OUTPATIENT)
Dept: INFUSION CENTER | Facility: HOSPITAL | Age: 55
Discharge: HOME | End: 2020-01-06
Attending: INTERNAL MEDICINE
Payer: COMMERCIAL

## 2020-01-06 DIAGNOSIS — D51.8 OTHER VITAMIN B12 DEFICIENCY ANEMIAS: Primary | ICD-10-CM

## 2020-01-06 PROCEDURE — 96372 THER/PROPH/DIAG INJ SC/IM: CPT

## 2020-01-06 RX ORDER — CYANOCOBALAMIN 1000 UG/ML
1000 INJECTION INTRAMUSCULAR; SUBCUTANEOUS ONCE
Status: CANCELLED | OUTPATIENT
Start: 2020-02-03

## 2020-01-06 RX ORDER — CYANOCOBALAMIN 1000 UG/ML
1000 INJECTION INTRAMUSCULAR; SUBCUTANEOUS ONCE
Status: COMPLETED | OUTPATIENT
Start: 2020-01-06 | End: 2020-01-06

## 2020-01-06 RX ADMIN — CYANOCOBALAMIN 1000 MCG: 1000 INJECTION INTRAMUSCULAR; SUBCUTANEOUS at 08:58

## 2020-01-06 NOTE — PROGRESS NOTES
Pt tolerated B12 injection without difficulty  Next appt scheduled  [facility] notified of next appt  Left ambulatory with AVS and [facility] transport

## 2020-02-03 ENCOUNTER — HOSPITAL ENCOUNTER (OUTPATIENT)
Dept: INFUSION CENTER | Facility: HOSPITAL | Age: 55
Discharge: HOME | End: 2020-02-03
Attending: INTERNAL MEDICINE
Payer: COMMERCIAL

## 2020-02-03 DIAGNOSIS — D51.8 OTHER VITAMIN B12 DEFICIENCY ANEMIAS: Primary | ICD-10-CM

## 2020-02-03 PROCEDURE — 96372 THER/PROPH/DIAG INJ SC/IM: CPT

## 2020-02-03 RX ORDER — CYANOCOBALAMIN 1000 UG/ML
1000 INJECTION INTRAMUSCULAR; SUBCUTANEOUS ONCE
Status: CANCELLED | OUTPATIENT
Start: 2020-03-02

## 2020-02-03 RX ORDER — CYANOCOBALAMIN 1000 UG/ML
1000 INJECTION INTRAMUSCULAR; SUBCUTANEOUS ONCE
Status: COMPLETED | OUTPATIENT
Start: 2020-02-03 | End: 2020-02-03

## 2020-02-03 RX ADMIN — CYANOCOBALAMIN 1000 MCG: 1000 INJECTION INTRAMUSCULAR; SUBCUTANEOUS at 08:42

## 2020-02-03 NOTE — PROGRESS NOTES
Pt tolerated B12 IM in the left deltoid  AVS printed and reviewed  Left unit ambulatory with a steady gait

## 2020-03-02 ENCOUNTER — HOSPITAL ENCOUNTER (OUTPATIENT)
Dept: INFUSION CENTER | Facility: HOSPITAL | Age: 55
Discharge: HOME | End: 2020-03-02
Attending: INTERNAL MEDICINE
Payer: COMMERCIAL

## 2020-03-02 DIAGNOSIS — D51.8 OTHER VITAMIN B12 DEFICIENCY ANEMIAS: Primary | ICD-10-CM

## 2020-03-02 PROCEDURE — 96372 THER/PROPH/DIAG INJ SC/IM: CPT

## 2020-03-02 RX ORDER — CYANOCOBALAMIN 1000 UG/ML
1000 INJECTION INTRAMUSCULAR; SUBCUTANEOUS ONCE
Status: COMPLETED | OUTPATIENT
Start: 2020-03-02 | End: 2020-03-02

## 2020-03-02 RX ORDER — CYANOCOBALAMIN 1000 UG/ML
1000 INJECTION INTRAMUSCULAR; SUBCUTANEOUS ONCE
Status: CANCELLED | OUTPATIENT
Start: 2020-03-30

## 2020-03-02 RX ADMIN — CYANOCOBALAMIN 1000 MCG: 1000 INJECTION INTRAMUSCULAR; SUBCUTANEOUS at 08:11

## 2020-03-02 NOTE — PROGRESS NOTES
Pt tolerated monthly vit b12 to left deltoid without complications  Confirmed next appts and AVS provided

## 2020-03-30 ENCOUNTER — TRANSCRIBE ORDERS (OUTPATIENT)
Dept: ADMINISTRATIVE | Facility: HOSPITAL | Age: 55
End: 2020-03-30

## 2020-03-30 ENCOUNTER — HOSPITAL ENCOUNTER (OUTPATIENT)
Dept: INFUSION CENTER | Facility: HOSPITAL | Age: 55
Discharge: HOME | End: 2020-03-30
Attending: INTERNAL MEDICINE
Payer: COMMERCIAL

## 2020-03-30 DIAGNOSIS — D51.8 OTHER VITAMIN B12 DEFICIENCY ANEMIAS: Primary | ICD-10-CM

## 2020-03-30 PROCEDURE — 96372 THER/PROPH/DIAG INJ SC/IM: CPT

## 2020-03-30 RX ORDER — CYANOCOBALAMIN 1000 UG/ML
1000 INJECTION INTRAMUSCULAR; SUBCUTANEOUS ONCE
Status: CANCELLED | OUTPATIENT
Start: 2020-04-27

## 2020-03-30 RX ORDER — CYANOCOBALAMIN 1000 UG/ML
1000 INJECTION INTRAMUSCULAR; SUBCUTANEOUS ONCE
Status: COMPLETED | OUTPATIENT
Start: 2020-03-30 | End: 2020-03-30

## 2020-03-30 RX ADMIN — CYANOCOBALAMIN 1000 MCG: 1000 INJECTION INTRAMUSCULAR; SUBCUTANEOUS at 08:12

## 2020-04-06 ENCOUNTER — APPOINTMENT (OUTPATIENT)
Dept: LAB | Facility: HOSPITAL | Age: 55
End: 2020-04-06
Payer: COMMERCIAL

## 2020-04-06 DIAGNOSIS — D51.8 OTHER VITAMIN B12 DEFICIENCY ANEMIAS: ICD-10-CM

## 2020-04-06 DIAGNOSIS — D69.6 THROMBOCYTOPENIA (HCC): ICD-10-CM

## 2020-04-06 DIAGNOSIS — D64.9 ANEMIA, UNSPECIFIED TYPE: ICD-10-CM

## 2020-04-06 LAB
ALBUMIN SERPL BCP-MCNC: 4.1 G/DL (ref 3–5.2)
ALP SERPL-CCNC: 90 U/L (ref 43–122)
ALT SERPL W P-5'-P-CCNC: 22 U/L (ref 9–52)
ANION GAP SERPL CALCULATED.3IONS-SCNC: 7 MMOL/L (ref 5–14)
AST SERPL W P-5'-P-CCNC: 28 U/L (ref 17–59)
BASOPHILS # BLD AUTO: 0 THOUSANDS/ΜL (ref 0–0.1)
BASOPHILS NFR BLD AUTO: 1 % (ref 0–1)
BILIRUB SERPL-MCNC: 0.4 MG/DL
BUN SERPL-MCNC: 11 MG/DL (ref 5–25)
CALCIUM SERPL-MCNC: 9.4 MG/DL (ref 8.4–10.2)
CHLORIDE SERPL-SCNC: 106 MMOL/L (ref 97–108)
CO2 SERPL-SCNC: 26 MMOL/L (ref 22–30)
CREAT SERPL-MCNC: 1 MG/DL (ref 0.7–1.5)
CRP SERPL QL: <5 MG/L
EOSINOPHIL # BLD AUTO: 0 THOUSAND/ΜL (ref 0–0.4)
EOSINOPHIL NFR BLD AUTO: 0 % (ref 0–6)
ERYTHROCYTE [DISTWIDTH] IN BLOOD BY AUTOMATED COUNT: 13.5 %
ERYTHROCYTE [SEDIMENTATION RATE] IN BLOOD: 14 MM/HOUR (ref 1–20)
FERRITIN SERPL-MCNC: 128 NG/ML (ref 8–388)
GFR SERPL CREATININE-BSD FRML MDRD: 85 ML/MIN/1.73SQ M
GLUCOSE P FAST SERPL-MCNC: 217 MG/DL (ref 70–99)
HCT VFR BLD AUTO: 41.3 % (ref 41–53)
HGB BLD-MCNC: 14 G/DL (ref 13.5–17.5)
IRON SATN MFR SERPL: 22 %
IRON SERPL-MCNC: 65 UG/DL (ref 65–175)
LDH SERPL-CCNC: 372 U/L (ref 313–618)
LYMPHOCYTES # BLD AUTO: 1.4 THOUSANDS/ΜL (ref 0.5–4)
LYMPHOCYTES NFR BLD AUTO: 30 % (ref 25–45)
MCH RBC QN AUTO: 30.8 PG (ref 26–34)
MCHC RBC AUTO-ENTMCNC: 33.7 G/DL (ref 31–36)
MCV RBC AUTO: 91 FL (ref 80–100)
MONOCYTES # BLD AUTO: 0.5 THOUSAND/ΜL (ref 0.2–0.9)
MONOCYTES NFR BLD AUTO: 10 % (ref 1–10)
NEUTROPHILS # BLD AUTO: 2.8 THOUSANDS/ΜL (ref 1.8–7.8)
NEUTS SEG NFR BLD AUTO: 60 % (ref 45–65)
PLATELET # BLD AUTO: 120 THOUSANDS/UL (ref 150–450)
PMV BLD AUTO: 8.6 FL (ref 8.9–12.7)
POTASSIUM SERPL-SCNC: 4.4 MMOL/L (ref 3.6–5)
PROT SERPL-MCNC: 6.9 G/DL (ref 5.9–8.4)
RBC # BLD AUTO: 4.53 MILLION/UL (ref 4.5–5.9)
SODIUM SERPL-SCNC: 139 MMOL/L (ref 137–147)
TIBC SERPL-MCNC: 300 UG/DL (ref 250–450)
VIT B12 SERPL-MCNC: 777 PG/ML (ref 100–900)
WBC # BLD AUTO: 4.6 THOUSAND/UL (ref 4.5–11)

## 2020-04-06 PROCEDURE — 85652 RBC SED RATE AUTOMATED: CPT

## 2020-04-06 PROCEDURE — 36415 COLL VENOUS BLD VENIPUNCTURE: CPT

## 2020-04-06 PROCEDURE — 83615 LACTATE (LD) (LDH) ENZYME: CPT

## 2020-04-06 PROCEDURE — 80053 COMPREHEN METABOLIC PANEL: CPT

## 2020-04-06 PROCEDURE — 82728 ASSAY OF FERRITIN: CPT

## 2020-04-06 PROCEDURE — 83540 ASSAY OF IRON: CPT

## 2020-04-06 PROCEDURE — 82607 VITAMIN B-12: CPT

## 2020-04-06 PROCEDURE — 85025 COMPLETE CBC W/AUTO DIFF WBC: CPT

## 2020-04-06 PROCEDURE — 86140 C-REACTIVE PROTEIN: CPT

## 2020-04-06 PROCEDURE — 83550 IRON BINDING TEST: CPT

## 2020-04-07 ENCOUNTER — TELEPHONE (OUTPATIENT)
Dept: HEMATOLOGY ONCOLOGY | Facility: CLINIC | Age: 55
End: 2020-04-07

## 2020-04-08 ENCOUNTER — OFFICE VISIT (OUTPATIENT)
Dept: HEMATOLOGY ONCOLOGY | Facility: CLINIC | Age: 55
End: 2020-04-08
Payer: COMMERCIAL

## 2020-04-08 VITALS
HEIGHT: 72 IN | TEMPERATURE: 97.3 F | DIASTOLIC BLOOD PRESSURE: 82 MMHG | OXYGEN SATURATION: 97 % | BODY MASS INDEX: 27.74 KG/M2 | WEIGHT: 204.8 LBS | HEART RATE: 88 BPM | SYSTOLIC BLOOD PRESSURE: 124 MMHG | RESPIRATION RATE: 16 BRPM

## 2020-04-08 DIAGNOSIS — D64.9 ANEMIA, UNSPECIFIED TYPE: ICD-10-CM

## 2020-04-08 DIAGNOSIS — D69.6 THROMBOCYTOPENIA (HCC): Primary | ICD-10-CM

## 2020-04-08 DIAGNOSIS — K74.60 CIRRHOSIS OF LIVER WITHOUT ASCITES, UNSPECIFIED HEPATIC CIRRHOSIS TYPE (HCC): ICD-10-CM

## 2020-04-08 DIAGNOSIS — D51.8 OTHER VITAMIN B12 DEFICIENCY ANEMIAS: ICD-10-CM

## 2020-04-08 PROCEDURE — 99214 OFFICE O/P EST MOD 30 MIN: CPT | Performed by: INTERNAL MEDICINE

## 2020-04-08 RX ORDER — INSULIN GLARGINE 300 U/ML
INJECTION, SOLUTION SUBCUTANEOUS
COMMUNITY
Start: 2020-02-26

## 2020-04-08 RX ORDER — PEN NEEDLE, DIABETIC 32GX 5/32"
NEEDLE, DISPOSABLE MISCELLANEOUS
COMMUNITY
Start: 2020-03-23

## 2020-04-08 RX ORDER — CITALOPRAM 20 MG/1
TABLET ORAL
COMMUNITY
Start: 2020-01-30

## 2020-04-08 RX ORDER — BENZTROPINE MESYLATE 0.5 MG/1
TABLET ORAL
COMMUNITY
Start: 2019-10-31

## 2020-04-27 ENCOUNTER — HOSPITAL ENCOUNTER (OUTPATIENT)
Dept: INFUSION CENTER | Facility: HOSPITAL | Age: 55
Discharge: HOME | End: 2020-04-27
Attending: INTERNAL MEDICINE
Payer: COMMERCIAL

## 2020-04-27 DIAGNOSIS — D51.8 OTHER VITAMIN B12 DEFICIENCY ANEMIAS: Primary | ICD-10-CM

## 2020-04-27 PROCEDURE — 96372 THER/PROPH/DIAG INJ SC/IM: CPT

## 2020-04-27 RX ORDER — CYANOCOBALAMIN 1000 UG/ML
1000 INJECTION INTRAMUSCULAR; SUBCUTANEOUS ONCE
Status: CANCELLED | OUTPATIENT
Start: 2020-05-26

## 2020-04-27 RX ORDER — CYANOCOBALAMIN 1000 UG/ML
1000 INJECTION INTRAMUSCULAR; SUBCUTANEOUS ONCE
Status: COMPLETED | OUTPATIENT
Start: 2020-04-27 | End: 2020-04-27

## 2020-04-27 RX ADMIN — CYANOCOBALAMIN 1000 MCG: 1000 INJECTION INTRAMUSCULAR; SUBCUTANEOUS at 08:28

## 2020-05-26 ENCOUNTER — HOSPITAL ENCOUNTER (OUTPATIENT)
Dept: INFUSION CENTER | Facility: HOSPITAL | Age: 55
Discharge: HOME | End: 2020-05-26
Attending: INTERNAL MEDICINE
Payer: COMMERCIAL

## 2020-05-26 DIAGNOSIS — D51.8 OTHER VITAMIN B12 DEFICIENCY ANEMIAS: Primary | ICD-10-CM

## 2020-05-26 PROCEDURE — 96372 THER/PROPH/DIAG INJ SC/IM: CPT

## 2020-05-26 RX ORDER — CYANOCOBALAMIN 1000 UG/ML
1000 INJECTION INTRAMUSCULAR; SUBCUTANEOUS ONCE
Status: COMPLETED | OUTPATIENT
Start: 2020-05-26 | End: 2020-05-26

## 2020-05-26 RX ORDER — CYANOCOBALAMIN 1000 UG/ML
1000 INJECTION INTRAMUSCULAR; SUBCUTANEOUS ONCE
Status: CANCELLED | OUTPATIENT
Start: 2020-06-23

## 2020-05-26 RX ADMIN — CYANOCOBALAMIN 1000 MCG: 1000 INJECTION INTRAMUSCULAR; SUBCUTANEOUS at 12:29

## 2020-06-23 ENCOUNTER — HOSPITAL ENCOUNTER (OUTPATIENT)
Dept: INFUSION CENTER | Facility: HOSPITAL | Age: 55
Discharge: HOME | End: 2020-06-23
Attending: INTERNAL MEDICINE
Payer: COMMERCIAL

## 2020-06-23 DIAGNOSIS — D51.8 OTHER VITAMIN B12 DEFICIENCY ANEMIAS: Primary | ICD-10-CM

## 2020-06-23 PROCEDURE — 96372 THER/PROPH/DIAG INJ SC/IM: CPT

## 2020-06-23 RX ORDER — CYANOCOBALAMIN 1000 UG/ML
1000 INJECTION INTRAMUSCULAR; SUBCUTANEOUS ONCE
Status: COMPLETED | OUTPATIENT
Start: 2020-06-23 | End: 2020-06-23

## 2020-06-23 RX ORDER — CYANOCOBALAMIN 1000 UG/ML
1000 INJECTION INTRAMUSCULAR; SUBCUTANEOUS ONCE
Status: CANCELLED | OUTPATIENT
Start: 2020-07-21

## 2020-06-23 RX ORDER — MULTIVIT WITH MINERALS/LUTEIN
3 TABLET ORAL EVERY EVENING
COMMUNITY
Start: 2020-06-11

## 2020-06-23 RX ORDER — CLOZAPINE 200 MG/1
TABLET ORAL
COMMUNITY
Start: 2020-06-15

## 2020-06-23 RX ORDER — ALLOPURINOL 100 MG/1
100 TABLET ORAL DAILY
COMMUNITY
Start: 2020-05-18

## 2020-06-23 RX ORDER — CLOZAPINE 50 MG/1
TABLET ORAL
COMMUNITY
Start: 2020-06-15

## 2020-06-23 RX ADMIN — CYANOCOBALAMIN 1000 MCG: 1000 INJECTION INTRAMUSCULAR; SUBCUTANEOUS at 13:13

## 2020-07-21 ENCOUNTER — HOSPITAL ENCOUNTER (OUTPATIENT)
Dept: INFUSION CENTER | Facility: HOSPITAL | Age: 55
Discharge: HOME | End: 2020-07-21
Attending: INTERNAL MEDICINE
Payer: COMMERCIAL

## 2020-07-21 DIAGNOSIS — D51.8 OTHER VITAMIN B12 DEFICIENCY ANEMIAS: Primary | ICD-10-CM

## 2020-07-21 PROCEDURE — 96372 THER/PROPH/DIAG INJ SC/IM: CPT

## 2020-07-21 RX ORDER — CYANOCOBALAMIN 1000 UG/ML
1000 INJECTION INTRAMUSCULAR; SUBCUTANEOUS ONCE
Status: COMPLETED | OUTPATIENT
Start: 2020-07-21 | End: 2020-07-21

## 2020-07-21 RX ORDER — CYANOCOBALAMIN 1000 UG/ML
1000 INJECTION INTRAMUSCULAR; SUBCUTANEOUS ONCE
Status: CANCELLED | OUTPATIENT
Start: 2020-08-18

## 2020-07-21 RX ADMIN — CYANOCOBALAMIN 1000 MCG: 1000 INJECTION, SOLUTION INTRAMUSCULAR; SUBCUTANEOUS at 13:07

## 2020-07-21 NOTE — PLAN OF CARE
Problem: Potential for Falls  Goal: Patient will remain free of falls  Description  INTERVENTIONS:  - Assess patient frequently for physical needs  -  Identify cognitive and physical deficits and behaviors that affect risk of falls    -  [place] fall precautions as indicated by assessment   - Educate patient/family on patient safety including physical limitations  - Instruct patient to call for assistance with activity based on assessment  - Modify environment to reduce risk of injury  - Consider OT/PT consult to assist with strengthening/mobility  Outcome: Progressing

## 2020-07-21 NOTE — PROGRESS NOTES
Pt tolerated vitamin b12 injection well to left deltoid  Next several appts scheduled for pt  Will send out to [facility] transport   discharged ambulatory with avs

## 2020-07-29 ENCOUNTER — TELEPHONE (OUTPATIENT)
Dept: HEMATOLOGY ONCOLOGY | Facility: CLINIC | Age: 55
End: 2020-07-29

## 2020-07-29 NOTE — TELEPHONE ENCOUNTER
Patient has f/u apt on Thurs 10/08 w/Rosa Winkler at [address] [place] [address]  Called pt to reschedule, and pt did not answer, and could not leave a message  Change pt's apt for Friday 10/09 at 11:00 a m  W/Rosa JIMENEZ at [address] [place] [address]  I will mail out a letter to the pt making him aware the apt has changed

## 2020-08-10 ENCOUNTER — APPOINTMENT (OUTPATIENT)
Dept: LAB | Facility: HOSPITAL | Age: 55
End: 2020-08-10
Attending: FAMILY MEDICINE
Payer: COMMERCIAL

## 2020-08-10 ENCOUNTER — TRANSCRIBE ORDERS (OUTPATIENT)
Dept: LAB | Facility: HOSPITAL | Age: 55
End: 2020-08-10

## 2020-08-10 DIAGNOSIS — E11.9 DIABETES MELLITUS WITHOUT COMPLICATION (HCC): ICD-10-CM

## 2020-08-10 DIAGNOSIS — E11.9 DIABETES MELLITUS WITHOUT COMPLICATION (HCC): Primary | ICD-10-CM

## 2020-08-10 DIAGNOSIS — D69.6 THROMBOCYTOPENIA, UNSPECIFIED (HCC): ICD-10-CM

## 2020-08-10 DIAGNOSIS — F25.9 SCHIZOAFFECTIVE DISORDER, SUBCHRONIC CONDITION (HCC): ICD-10-CM

## 2020-08-10 LAB
ALBUMIN SERPL BCP-MCNC: 4.4 G/DL (ref 3–5.2)
ALP SERPL-CCNC: 89 U/L (ref 43–122)
ALT SERPL W P-5'-P-CCNC: 25 U/L (ref 9–52)
ANION GAP SERPL CALCULATED.3IONS-SCNC: 8 MMOL/L (ref 5–14)
AST SERPL W P-5'-P-CCNC: 24 U/L (ref 17–59)
BILIRUB SERPL-MCNC: 0.9 MG/DL
BUN SERPL-MCNC: 13 MG/DL (ref 5–25)
CALCIUM SERPL-MCNC: 9.6 MG/DL (ref 8.4–10.2)
CHLORIDE SERPL-SCNC: 109 MMOL/L (ref 97–108)
CHOLEST SERPL-MCNC: 177 MG/DL
CO2 SERPL-SCNC: 23 MMOL/L (ref 22–30)
CREAT SERPL-MCNC: 1.04 MG/DL (ref 0.7–1.5)
ERYTHROCYTE [DISTWIDTH] IN BLOOD BY AUTOMATED COUNT: 13.2 %
EST. AVERAGE GLUCOSE BLD GHB EST-MCNC: 123 MG/DL
GFR SERPL CREATININE-BSD FRML MDRD: 81 ML/MIN/1.73SQ M
GLUCOSE P FAST SERPL-MCNC: 188 MG/DL (ref 70–99)
HBA1C MFR BLD: 5.9 %
HCT VFR BLD AUTO: 39.7 % (ref 41–53)
HDLC SERPL-MCNC: 41 MG/DL
HGB BLD-MCNC: 13.3 G/DL (ref 13.5–17.5)
LDLC SERPL CALC-MCNC: 115 MG/DL
MCH RBC QN AUTO: 30.2 PG (ref 26–34)
MCHC RBC AUTO-ENTMCNC: 33.6 G/DL (ref 31–36)
MCV RBC AUTO: 90 FL (ref 80–100)
NONHDLC SERPL-MCNC: 136 MG/DL
PLATELET # BLD AUTO: 114 THOUSANDS/UL (ref 150–450)
PMV BLD AUTO: 8.7 FL (ref 8.9–12.7)
POTASSIUM SERPL-SCNC: 4 MMOL/L (ref 3.6–5)
PROT SERPL-MCNC: 7.1 G/DL (ref 5.9–8.4)
RBC # BLD AUTO: 4.42 MILLION/UL (ref 4.5–5.9)
SODIUM SERPL-SCNC: 140 MMOL/L (ref 137–147)
TRIGL SERPL-MCNC: 103 MG/DL
TSH SERPL DL<=0.05 MIU/L-ACNC: 1.61 UIU/ML (ref 0.47–4.68)
WBC # BLD AUTO: 5.4 THOUSAND/UL (ref 4.5–11)

## 2020-08-10 PROCEDURE — 36415 COLL VENOUS BLD VENIPUNCTURE: CPT

## 2020-08-10 PROCEDURE — 85027 COMPLETE CBC AUTOMATED: CPT

## 2020-08-10 PROCEDURE — 80061 LIPID PANEL: CPT

## 2020-08-10 PROCEDURE — 84443 ASSAY THYROID STIM HORMONE: CPT

## 2020-08-10 PROCEDURE — 80053 COMPREHEN METABOLIC PANEL: CPT

## 2020-08-10 PROCEDURE — 83036 HEMOGLOBIN GLYCOSYLATED A1C: CPT

## 2020-08-18 ENCOUNTER — HOSPITAL ENCOUNTER (OUTPATIENT)
Dept: INFUSION CENTER | Facility: HOSPITAL | Age: 55
Discharge: HOME | End: 2020-08-18
Attending: INTERNAL MEDICINE
Payer: COMMERCIAL

## 2020-08-18 DIAGNOSIS — D51.8 OTHER VITAMIN B12 DEFICIENCY ANEMIAS: Primary | ICD-10-CM

## 2020-08-18 PROCEDURE — 96372 THER/PROPH/DIAG INJ SC/IM: CPT

## 2020-08-18 RX ORDER — CYANOCOBALAMIN 1000 UG/ML
1000 INJECTION INTRAMUSCULAR; SUBCUTANEOUS ONCE
Status: COMPLETED | OUTPATIENT
Start: 2020-08-18 | End: 2020-08-18

## 2020-08-18 RX ORDER — CYANOCOBALAMIN 1000 UG/ML
1000 INJECTION INTRAMUSCULAR; SUBCUTANEOUS ONCE
Status: CANCELLED | OUTPATIENT
Start: 2020-09-15

## 2020-08-18 RX ADMIN — CYANOCOBALAMIN 1000 MCG: 1000 INJECTION INTRAMUSCULAR; SUBCUTANEOUS at 12:57

## 2020-08-18 NOTE — PROGRESS NOTES
Pt tolerated vitamin b12 injection to left arm well   Discharged ambulatory deferring avs  Next appt sept 25

## 2020-08-18 NOTE — PLAN OF CARE
Problem: Potential for Falls  Goal: Patient will remain free of falls  Description: INTERVENTIONS:  - Assess patient frequently for physical needs  -  Identify cognitive and physical deficits and behaviors that affect risk of falls    -  [place] fall precautions as indicated by assessment   - Educate patient/family on patient safety including physical limitations  - Instruct patient to call for assistance with activity based on assessment  - Modify environment to reduce risk of injury  - Consider OT/PT consult to assist with strengthening/mobility  Outcome: Progressing

## 2020-09-15 ENCOUNTER — HOSPITAL ENCOUNTER (OUTPATIENT)
Dept: INFUSION CENTER | Facility: HOSPITAL | Age: 55
Discharge: HOME | End: 2020-09-15
Attending: INTERNAL MEDICINE
Payer: COMMERCIAL

## 2020-09-15 DIAGNOSIS — D51.8 OTHER VITAMIN B12 DEFICIENCY ANEMIAS: Primary | ICD-10-CM

## 2020-09-15 PROCEDURE — 96372 THER/PROPH/DIAG INJ SC/IM: CPT

## 2020-09-15 RX ORDER — CYANOCOBALAMIN 1000 UG/ML
1000 INJECTION INTRAMUSCULAR; SUBCUTANEOUS ONCE
Status: CANCELLED | OUTPATIENT
Start: 2020-10-13

## 2020-09-15 RX ORDER — CYANOCOBALAMIN 1000 UG/ML
1000 INJECTION INTRAMUSCULAR; SUBCUTANEOUS ONCE
Status: COMPLETED | OUTPATIENT
Start: 2020-09-15 | End: 2020-09-15

## 2020-09-15 RX ADMIN — CYANOCOBALAMIN 1000 MCG: 1000 INJECTION INTRAMUSCULAR; SUBCUTANEOUS at 12:34

## 2020-09-15 NOTE — PLAN OF CARE
Problem: Potential for Falls  Goal: Patient will remain free of falls  Description: INTERVENTIONS:  - Assess patient frequently for physical needs  -  Identify cognitive and physical deficits and behaviors that affect risk of falls    -  [place] fall precautions as indicated by assessment   - Educate patient/family on patient safety including physical limitations  - Instruct patient to call for assistance with activity based on assessment  - Modify environment to reduce risk of injury  - Consider OT/PT consult to assist with strengthening/mobility  Outcome: Progressing

## 2020-09-15 NOTE — PROGRESS NOTES
Pt tolerated B12 in the left deltoid  Next apt confirmed and AVS provided  Left unit ambulatory with a steady gait

## 2020-10-06 ENCOUNTER — TELEPHONE (OUTPATIENT)
Dept: HEMATOLOGY ONCOLOGY | Facility: CLINIC | Age: 55
End: 2020-10-06

## 2020-10-07 ENCOUNTER — APPOINTMENT (OUTPATIENT)
Dept: LAB | Facility: HOSPITAL | Age: 55
End: 2020-10-07
Attending: INTERNAL MEDICINE
Payer: COMMERCIAL

## 2020-10-07 DIAGNOSIS — K74.60 CIRRHOSIS OF LIVER WITHOUT ASCITES, UNSPECIFIED HEPATIC CIRRHOSIS TYPE (HCC): ICD-10-CM

## 2020-10-07 DIAGNOSIS — D69.6 THROMBOCYTOPENIA (HCC): ICD-10-CM

## 2020-10-07 DIAGNOSIS — D51.8 OTHER VITAMIN B12 DEFICIENCY ANEMIAS: ICD-10-CM

## 2020-10-07 DIAGNOSIS — D64.9 ANEMIA, UNSPECIFIED TYPE: ICD-10-CM

## 2020-10-07 LAB
ALBUMIN SERPL BCP-MCNC: 4 G/DL (ref 3–5.2)
ALP SERPL-CCNC: 82 U/L (ref 43–122)
ALT SERPL W P-5'-P-CCNC: 18 U/L (ref 9–52)
ANION GAP SERPL CALCULATED.3IONS-SCNC: 7 MMOL/L (ref 5–14)
AST SERPL W P-5'-P-CCNC: 23 U/L (ref 17–59)
BASOPHILS # BLD AUTO: 0 THOUSANDS/ΜL (ref 0–0.1)
BASOPHILS NFR BLD AUTO: 1 % (ref 0–1)
BILIRUB SERPL-MCNC: 0.8 MG/DL
BUN SERPL-MCNC: 10 MG/DL (ref 5–25)
CALCIUM SERPL-MCNC: 9.2 MG/DL (ref 8.4–10.2)
CHLORIDE SERPL-SCNC: 105 MMOL/L (ref 97–108)
CO2 SERPL-SCNC: 23 MMOL/L (ref 22–30)
CREAT SERPL-MCNC: 1.08 MG/DL (ref 0.7–1.5)
CRP SERPL QL: <5 MG/L
EOSINOPHIL # BLD AUTO: 0 THOUSAND/ΜL (ref 0–0.4)
EOSINOPHIL NFR BLD AUTO: 0 % (ref 0–6)
ERYTHROCYTE [DISTWIDTH] IN BLOOD BY AUTOMATED COUNT: 13 %
ERYTHROCYTE [SEDIMENTATION RATE] IN BLOOD: 10 MM/HOUR (ref 0–19)
GFR SERPL CREATININE-BSD FRML MDRD: 77 ML/MIN/1.73SQ M
GLUCOSE P FAST SERPL-MCNC: 305 MG/DL (ref 70–99)
HCT VFR BLD AUTO: 38.1 % (ref 41–53)
HGB BLD-MCNC: 12.9 G/DL (ref 13.5–17.5)
LYMPHOCYTES # BLD AUTO: 1 THOUSANDS/ΜL (ref 0.5–4)
LYMPHOCYTES NFR BLD AUTO: 18 % (ref 25–45)
MCH RBC QN AUTO: 30.9 PG (ref 26–34)
MCHC RBC AUTO-ENTMCNC: 33.9 G/DL (ref 31–36)
MCV RBC AUTO: 91 FL (ref 80–100)
MONOCYTES # BLD AUTO: 0.4 THOUSAND/ΜL (ref 0.2–0.9)
MONOCYTES NFR BLD AUTO: 8 % (ref 1–10)
NEUTROPHILS # BLD AUTO: 4.1 THOUSANDS/ΜL (ref 1.8–7.8)
NEUTS SEG NFR BLD AUTO: 73 % (ref 45–65)
PLATELET # BLD AUTO: 112 THOUSANDS/UL (ref 150–450)
PMV BLD AUTO: 9.3 FL (ref 8.9–12.7)
POTASSIUM SERPL-SCNC: 4.1 MMOL/L (ref 3.6–5)
PROT SERPL-MCNC: 6.6 G/DL (ref 5.9–8.4)
RBC # BLD AUTO: 4.18 MILLION/UL (ref 4.5–5.9)
SODIUM SERPL-SCNC: 135 MMOL/L (ref 137–147)
VIT B12 SERPL-MCNC: 505 PG/ML (ref 100–900)
WBC # BLD AUTO: 5.6 THOUSAND/UL (ref 4.5–11)

## 2020-10-07 PROCEDURE — 85025 COMPLETE CBC W/AUTO DIFF WBC: CPT

## 2020-10-07 PROCEDURE — 80053 COMPREHEN METABOLIC PANEL: CPT

## 2020-10-07 PROCEDURE — 82607 VITAMIN B-12: CPT

## 2020-10-07 PROCEDURE — 85652 RBC SED RATE AUTOMATED: CPT

## 2020-10-07 PROCEDURE — 82105 ALPHA-FETOPROTEIN SERUM: CPT

## 2020-10-07 PROCEDURE — 86140 C-REACTIVE PROTEIN: CPT

## 2020-10-07 PROCEDURE — 36415 COLL VENOUS BLD VENIPUNCTURE: CPT

## 2020-10-08 LAB — AFP-TM SERPL-MCNC: 1.1 NG/ML (ref 0.5–8)

## 2020-10-13 ENCOUNTER — HOSPITAL ENCOUNTER (OUTPATIENT)
Dept: INFUSION CENTER | Facility: HOSPITAL | Age: 55
Discharge: HOME | End: 2020-10-13
Attending: INTERNAL MEDICINE
Payer: COMMERCIAL

## 2020-10-13 ENCOUNTER — TELEPHONE (OUTPATIENT)
Dept: HEMATOLOGY ONCOLOGY | Facility: CLINIC | Age: 55
End: 2020-10-13

## 2020-10-13 DIAGNOSIS — D51.8 OTHER VITAMIN B12 DEFICIENCY ANEMIAS: Primary | ICD-10-CM

## 2020-10-13 PROCEDURE — 96372 THER/PROPH/DIAG INJ SC/IM: CPT

## 2020-10-13 RX ORDER — CYANOCOBALAMIN 1000 UG/ML
1000 INJECTION INTRAMUSCULAR; SUBCUTANEOUS ONCE
Status: CANCELLED | OUTPATIENT
Start: 2020-11-10

## 2020-10-13 RX ORDER — CYANOCOBALAMIN 1000 UG/ML
1000 INJECTION INTRAMUSCULAR; SUBCUTANEOUS ONCE
Status: COMPLETED | OUTPATIENT
Start: 2020-10-13 | End: 2020-10-13

## 2020-10-13 RX ADMIN — CYANOCOBALAMIN 1000 MCG: 1000 INJECTION INTRAMUSCULAR; SUBCUTANEOUS at 11:58

## 2020-10-14 ENCOUNTER — TELEPHONE (OUTPATIENT)
Dept: HEMATOLOGY ONCOLOGY | Facility: MEDICAL CENTER | Age: 55
End: 2020-10-14

## 2020-10-14 ENCOUNTER — OFFICE VISIT (OUTPATIENT)
Dept: HEMATOLOGY ONCOLOGY | Facility: CLINIC | Age: 55
End: 2020-10-14
Payer: COMMERCIAL

## 2020-10-14 VITALS
SYSTOLIC BLOOD PRESSURE: 116 MMHG | DIASTOLIC BLOOD PRESSURE: 70 MMHG | HEIGHT: 72 IN | WEIGHT: 197.2 LBS | TEMPERATURE: 98.2 F | BODY MASS INDEX: 26.71 KG/M2 | OXYGEN SATURATION: 95 % | RESPIRATION RATE: 16 BRPM | HEART RATE: 85 BPM

## 2020-10-14 DIAGNOSIS — D64.9 NORMOCYTIC ANEMIA: ICD-10-CM

## 2020-10-14 DIAGNOSIS — R53.83 OTHER FATIGUE: ICD-10-CM

## 2020-10-14 DIAGNOSIS — D69.6 THROMBOCYTOPENIA (HCC): Primary | ICD-10-CM

## 2020-10-14 DIAGNOSIS — D51.8 OTHER VITAMIN B12 DEFICIENCY ANEMIAS: ICD-10-CM

## 2020-10-14 DIAGNOSIS — K74.60 CIRRHOSIS OF LIVER WITHOUT ASCITES, UNSPECIFIED HEPATIC CIRRHOSIS TYPE (HCC): ICD-10-CM

## 2020-10-14 PROCEDURE — 99214 OFFICE O/P EST MOD 30 MIN: CPT | Performed by: NURSE PRACTITIONER

## 2020-11-10 ENCOUNTER — HOSPITAL ENCOUNTER (OUTPATIENT)
Dept: INFUSION CENTER | Facility: HOSPITAL | Age: 55
Discharge: HOME | End: 2020-11-10
Attending: INTERNAL MEDICINE
Payer: COMMERCIAL

## 2020-11-10 DIAGNOSIS — D51.8 OTHER VITAMIN B12 DEFICIENCY ANEMIAS: Primary | ICD-10-CM

## 2020-11-10 PROCEDURE — 96372 THER/PROPH/DIAG INJ SC/IM: CPT

## 2020-11-10 RX ORDER — CYANOCOBALAMIN 1000 UG/ML
1000 INJECTION INTRAMUSCULAR; SUBCUTANEOUS ONCE
Status: CANCELLED | OUTPATIENT
Start: 2020-12-08

## 2020-11-10 RX ORDER — CYANOCOBALAMIN 1000 UG/ML
1000 INJECTION INTRAMUSCULAR; SUBCUTANEOUS ONCE
Status: COMPLETED | OUTPATIENT
Start: 2020-11-10 | End: 2020-11-10

## 2020-11-10 RX ORDER — FLUOXETINE HYDROCHLORIDE 20 MG/1
20 CAPSULE ORAL DAILY
COMMUNITY
Start: 2020-09-24

## 2020-11-10 RX ORDER — MELOXICAM 7.5 MG/1
TABLET ORAL
COMMUNITY

## 2020-11-10 RX ORDER — METHYLPHENIDATE HYDROCHLORIDE 10 MG/1
TABLET ORAL
COMMUNITY
Start: 2020-10-08

## 2020-11-10 RX ORDER — GLUCOSAMINE SULFATE 500 MG
1 TABLET ORAL EVERY EVENING
COMMUNITY
Start: 2020-11-05

## 2020-11-10 RX ORDER — SUCRALFATE 1 G/1
TABLET ORAL
COMMUNITY

## 2020-11-10 RX ORDER — VENLAFAXINE HYDROCHLORIDE 75 MG/1
75 CAPSULE, EXTENDED RELEASE ORAL DAILY
COMMUNITY
Start: 2020-11-05 | End: 2021-11-05

## 2020-11-10 RX ADMIN — CYANOCOBALAMIN 1000 MCG: 1000 INJECTION, SOLUTION INTRAMUSCULAR; SUBCUTANEOUS at 12:24

## 2020-12-08 ENCOUNTER — HOSPITAL ENCOUNTER (OUTPATIENT)
Dept: INFUSION CENTER | Facility: HOSPITAL | Age: 55
Discharge: HOME | End: 2020-12-08
Attending: INTERNAL MEDICINE
Payer: COMMERCIAL

## 2020-12-08 DIAGNOSIS — D51.8 OTHER VITAMIN B12 DEFICIENCY ANEMIAS: Primary | ICD-10-CM

## 2020-12-08 PROCEDURE — 96372 THER/PROPH/DIAG INJ SC/IM: CPT

## 2020-12-08 RX ORDER — CYANOCOBALAMIN 1000 UG/ML
1000 INJECTION INTRAMUSCULAR; SUBCUTANEOUS ONCE
Status: CANCELLED | OUTPATIENT
Start: 2021-01-05

## 2020-12-08 RX ORDER — CYANOCOBALAMIN 1000 UG/ML
1000 INJECTION INTRAMUSCULAR; SUBCUTANEOUS ONCE
Status: COMPLETED | OUTPATIENT
Start: 2020-12-08 | End: 2020-12-08

## 2020-12-08 RX ADMIN — CYANOCOBALAMIN 1000 MCG: 1000 INJECTION INTRAMUSCULAR; SUBCUTANEOUS at 10:57

## 2021-01-05 ENCOUNTER — HOSPITAL ENCOUNTER (OUTPATIENT)
Dept: INFUSION CENTER | Facility: HOSPITAL | Age: 56
Discharge: HOME | End: 2021-01-05
Attending: INTERNAL MEDICINE
Payer: COMMERCIAL

## 2021-01-05 DIAGNOSIS — D51.8 OTHER VITAMIN B12 DEFICIENCY ANEMIAS: Primary | ICD-10-CM

## 2021-01-05 PROCEDURE — 96372 THER/PROPH/DIAG INJ SC/IM: CPT

## 2021-01-05 RX ORDER — ERGOCALCIFEROL 1.25 MG/1
50000 CAPSULE ORAL WEEKLY
COMMUNITY

## 2021-01-05 RX ORDER — CYANOCOBALAMIN 1000 UG/ML
1000 INJECTION INTRAMUSCULAR; SUBCUTANEOUS ONCE
Status: COMPLETED | OUTPATIENT
Start: 2021-01-05 | End: 2021-01-05

## 2021-01-05 RX ORDER — CYANOCOBALAMIN 1000 UG/ML
1000 INJECTION INTRAMUSCULAR; SUBCUTANEOUS ONCE
Status: CANCELLED | OUTPATIENT
Start: 2021-02-02

## 2021-01-05 RX ADMIN — CYANOCOBALAMIN 1000 MCG: 1000 INJECTION INTRAMUSCULAR; SUBCUTANEOUS at 10:39

## 2021-01-05 NOTE — PROGRESS NOTES
Pt tolerated B12 injection to L deltoid without difficulty  Next appt scheduled and AVS provided  Left ambulatory with [facility] transport

## 2021-01-15 ENCOUNTER — LAB (OUTPATIENT)
Dept: LAB | Facility: HOSPITAL | Age: 56
End: 2021-01-15
Payer: COMMERCIAL

## 2021-01-15 DIAGNOSIS — R53.83 OTHER FATIGUE: ICD-10-CM

## 2021-01-15 DIAGNOSIS — D69.6 THROMBOCYTOPENIA (HCC): ICD-10-CM

## 2021-01-15 DIAGNOSIS — D64.9 NORMOCYTIC ANEMIA: ICD-10-CM

## 2021-01-15 DIAGNOSIS — D51.8 OTHER VITAMIN B12 DEFICIENCY ANEMIAS: ICD-10-CM

## 2021-01-15 LAB
25(OH)D3 SERPL-MCNC: 11.7 NG/ML (ref 30–100)
ALBUMIN SERPL BCP-MCNC: 4.2 G/DL (ref 3–5.2)
ALP SERPL-CCNC: 87 U/L (ref 43–122)
ALT SERPL W P-5'-P-CCNC: 21 U/L (ref 9–52)
ANION GAP SERPL CALCULATED.3IONS-SCNC: 5 MMOL/L (ref 5–14)
AST SERPL W P-5'-P-CCNC: 22 U/L (ref 17–59)
BASOPHILS # BLD AUTO: 0 THOUSANDS/ΜL (ref 0–0.1)
BASOPHILS NFR BLD AUTO: 1 % (ref 0–1)
BILIRUB SERPL-MCNC: 0.6 MG/DL
BUN SERPL-MCNC: 16 MG/DL (ref 5–25)
CALCIUM SERPL-MCNC: 9.5 MG/DL (ref 8.4–10.2)
CHLORIDE SERPL-SCNC: 110 MMOL/L (ref 97–108)
CO2 SERPL-SCNC: 26 MMOL/L (ref 22–30)
CREAT SERPL-MCNC: 1.15 MG/DL (ref 0.7–1.5)
CRP SERPL QL: <5 MG/L
EOSINOPHIL # BLD AUTO: 0 THOUSAND/ΜL (ref 0–0.4)
EOSINOPHIL NFR BLD AUTO: 0 % (ref 0–6)
ERYTHROCYTE [DISTWIDTH] IN BLOOD BY AUTOMATED COUNT: 13.2 %
ERYTHROCYTE [SEDIMENTATION RATE] IN BLOOD: 12 MM/HOUR (ref 0–19)
FERRITIN SERPL-MCNC: 125 NG/ML (ref 8–388)
FOLATE SERPL-MCNC: 9.9 NG/ML (ref 3.1–17.5)
GFR SERPL CREATININE-BSD FRML MDRD: 72 ML/MIN/1.73SQ M
GLUCOSE P FAST SERPL-MCNC: 145 MG/DL (ref 70–99)
HCT VFR BLD AUTO: 39.5 % (ref 41–53)
HGB BLD-MCNC: 12.7 G/DL (ref 13.5–17.5)
IRON SATN MFR SERPL: 20 %
IRON SERPL-MCNC: 57 UG/DL (ref 65–175)
LDH SERPL-CCNC: 380 U/L (ref 313–618)
LYMPHOCYTES # BLD AUTO: 1.3 THOUSANDS/ΜL (ref 0.5–4)
LYMPHOCYTES NFR BLD AUTO: 26 % (ref 25–45)
MCH RBC QN AUTO: 29.3 PG (ref 26–34)
MCHC RBC AUTO-ENTMCNC: 32.2 G/DL (ref 31–36)
MCV RBC AUTO: 91 FL (ref 80–100)
MONOCYTES # BLD AUTO: 0.4 THOUSAND/ΜL (ref 0.2–0.9)
MONOCYTES NFR BLD AUTO: 9 % (ref 1–10)
NEUTROPHILS # BLD AUTO: 3.1 THOUSANDS/ΜL (ref 1.8–7.8)
NEUTS SEG NFR BLD AUTO: 65 % (ref 45–65)
PLATELET # BLD AUTO: 107 THOUSANDS/UL (ref 150–450)
PMV BLD AUTO: 9.1 FL (ref 8.9–12.7)
POTASSIUM SERPL-SCNC: 4.7 MMOL/L (ref 3.6–5)
PROT SERPL-MCNC: 7.1 G/DL (ref 5.9–8.4)
RBC # BLD AUTO: 4.34 MILLION/UL (ref 4.5–5.9)
SODIUM SERPL-SCNC: 141 MMOL/L (ref 137–147)
TIBC SERPL-MCNC: 280 UG/DL (ref 250–450)
VIT B12 SERPL-MCNC: 663 PG/ML (ref 100–900)
WBC # BLD AUTO: 4.9 THOUSAND/UL (ref 4.5–11)

## 2021-01-15 PROCEDURE — 83540 ASSAY OF IRON: CPT

## 2021-01-15 PROCEDURE — 83615 LACTATE (LD) (LDH) ENZYME: CPT

## 2021-01-15 PROCEDURE — 83550 IRON BINDING TEST: CPT

## 2021-01-15 PROCEDURE — 85025 COMPLETE CBC W/AUTO DIFF WBC: CPT

## 2021-01-15 PROCEDURE — 80053 COMPREHEN METABOLIC PANEL: CPT

## 2021-01-15 PROCEDURE — 82728 ASSAY OF FERRITIN: CPT

## 2021-01-15 PROCEDURE — 85652 RBC SED RATE AUTOMATED: CPT

## 2021-01-15 PROCEDURE — 86140 C-REACTIVE PROTEIN: CPT

## 2021-01-15 PROCEDURE — 82306 VITAMIN D 25 HYDROXY: CPT

## 2021-01-15 PROCEDURE — 82746 ASSAY OF FOLIC ACID SERUM: CPT

## 2021-01-15 PROCEDURE — 82607 VITAMIN B-12: CPT

## 2021-01-15 PROCEDURE — 36415 COLL VENOUS BLD VENIPUNCTURE: CPT

## 2021-02-02 ENCOUNTER — HOSPITAL ENCOUNTER (OUTPATIENT)
Dept: INFUSION CENTER | Facility: HOSPITAL | Age: 56
End: 2021-02-02
Attending: INTERNAL MEDICINE

## 2021-02-12 DIAGNOSIS — D51.8 OTHER VITAMIN B12 DEFICIENCY ANEMIAS: Primary | ICD-10-CM

## 2021-02-12 RX ORDER — CYANOCOBALAMIN 1000 UG/ML
1000 INJECTION INTRAMUSCULAR; SUBCUTANEOUS ONCE
Status: CANCELLED | OUTPATIENT
Start: 2021-02-16

## 2021-02-15 ENCOUNTER — OFFICE VISIT (OUTPATIENT)
Dept: HEMATOLOGY ONCOLOGY | Facility: CLINIC | Age: 56
End: 2021-02-15
Payer: COMMERCIAL

## 2021-02-15 VITALS
HEART RATE: 82 BPM | BODY MASS INDEX: 27.66 KG/M2 | HEIGHT: 72 IN | DIASTOLIC BLOOD PRESSURE: 92 MMHG | SYSTOLIC BLOOD PRESSURE: 148 MMHG | RESPIRATION RATE: 18 BRPM | TEMPERATURE: 98.9 F | WEIGHT: 204.2 LBS | OXYGEN SATURATION: 98 %

## 2021-02-15 DIAGNOSIS — D69.6 THROMBOCYTOPENIA (HCC): Primary | ICD-10-CM

## 2021-02-15 DIAGNOSIS — D64.9 ANEMIA, UNSPECIFIED TYPE: ICD-10-CM

## 2021-02-15 DIAGNOSIS — K74.60 CIRRHOSIS OF LIVER WITHOUT ASCITES, UNSPECIFIED HEPATIC CIRRHOSIS TYPE (HCC): ICD-10-CM

## 2021-02-15 PROCEDURE — 99213 OFFICE O/P EST LOW 20 MIN: CPT | Performed by: INTERNAL MEDICINE

## 2021-02-15 NOTE — PROGRESS NOTES
Hematology/Oncology Outpatient Follow-up  Dori Duncan 55 y o  male 1965 [id number]    Date:  2/15/2021    Assessment and Plan:  1  Thrombocytopenia (HCC)    There is mild decline of the platelet count but he continues to be above 100,000  This is most likely related to the liver  Cirrhosis and splenomegaly  We will continue to monitor it closely  - CBC and differential; Future  - C-reactive protein; Future  - Comprehensive metabolic panel; Future  - Sedimentation rate, automated; Future  - Occult Blood, Fecal Immunochemical; Future  - Reticulocytes; Future  - LD,Blood; Future  - Iron Panel (Includes Ferritin, Iron Sat%, Iron, and TIBC); Future  - Ferritin; Future  - Vitamin B12; Future  - AFP tumor marker; Future    2  Anemia, unspecified type   his anemia seems to be mild and slightly worse  I did ask him to submit stools for occult blood testing since variceal bleeding could happen with liver cirrhosis  His iron panel seems to be stable at this point in time  - CBC and differential; Future  - C-reactive protein; Future  - Comprehensive metabolic panel; Future  - Sedimentation rate, automated; Future  - Occult Blood, Fecal Immunochemical; Future  - Reticulocytes; Future  - LD,Blood; Future  - Iron Panel (Includes Ferritin, Iron Sat%, Iron, and TIBC); Future  - Ferritin; Future  - Vitamin B12; Future  - AFP tumor marker; Future    3  Cirrhosis of liver without ascites, unspecified hepatic cirrhosis type [facility])    He has well-established diagnosis of liver cirrhosis we will check his alpha fetoprotein prior to his next visit for close monitoring of any hint of HCC   - AFP tumor marker;  Future      HPI:  The patient has multiple comorbid conditions including heart arrhythmia, type 2 diabetes mellitus, liver cirrhosis due to fatty liver, history of arthritis, or gout, hyperuricemia, hypothyroidism, schizoaffective disorder, anxiety, depression, etc      He had extensive workup for his mild chronic stable thrombocytopenia which goes back at least to 2015  He also was found to have vitamin B12 deficiency which was corrected with monthly vitamin B12 injections   The workup was also negative for any hint of monoclonal gammopathy             Interval history:   the patient came today for a follow-up visit  He continues on vitamin B12 injections on a monthly basis  He continues also to complain about mild fatigue  His most recent blood work from 01/15/2021 showed hemoglobin of 12 7 with normal white cells  The platelet count went done to 107,000  Creatinine 1 15 with normal liver enzymes and electrolytes  Inflammation markers normal   Vitamin B12 663  Folate normal state 9 9  Vitamin-D low 11 7  Iron panel showed saturation 20% and ferritin of 125  He denied bleeding from any sites  ROS: Review of Systems   Constitutional: Positive for fatigue  Negative for chills and fever  HENT: Negative for ear pain and sore throat  Eyes: Negative for pain and visual disturbance  Respiratory: Negative for cough and shortness of breath  Cardiovascular: Negative for chest pain and palpitations  Gastrointestinal: Negative for abdominal pain and vomiting  Genitourinary: Negative for dysuria and hematuria  Musculoskeletal: Negative for arthralgias and back pain  Skin: Negative for color change and rash  Neurological: Positive for headaches  Negative for seizures and syncope  Psychiatric/Behavioral: Positive for sleep disturbance  All other systems reviewed and are negative  Past Medical History:   Diagnosis Date    Bipolar disorder ([facility] )     Diabetes mellitus, type II ([facility] )     PAT (paroxysmal atrial tachycardia) ([place] [address] )     Schizophrenic disorder ([place] [address] )        History reviewed  No pertinent surgical history      Social History     Socioeconomic History    Marital status: Single     Spouse name: None    Number of children: None    Years of education: None    Highest education level: None Occupational History    None   Social Needs    Financial resource strain: None    Food insecurity     Worry: None     Inability: None    Transportation needs     Medical: None     Non-medical: None   Tobacco Use    Smoking status: Current Every Day Smoker    Smokeless tobacco: Never Used   Substance and Sexual Activity    Alcohol use: No    Drug use: No    Sexual activity: None   Lifestyle    Physical activity     Days per week: None     Minutes per session: None    Stress: None   Relationships    Social connections     Talks on phone: None     Gets together: None     Attends Sabianism service: None     Active member of club or organization: None     Attends meetings of clubs or organizations: None     Relationship status: None    Intimate partner violence     Fear of current or ex partner: None     Emotionally abused: None     Physically abused: None     Forced sexual activity: None   Other Topics Concern    None   Social History Narrative    None       Family History   Problem Relation Age of Onset    Coronary artery disease Mother         premature       Allergies   Allergen Reactions    Bupropion     Chlorproethazine     Molindone     Paliperidone     Thiazide-Type Diuretics          Current Outpatient Medications:     allopurinol (ZYLOPRIM) 100 mg tablet, Take 100 mg by mouth daily, Disp: , Rfl:     ALPRAZolam (XANAX) 0 5 mg tablet, Take by mouth 3 (three) times a day, Disp: , Rfl:     BD PEN NEEDLE TREV U/F 32G X 4 MM MISC, use once daily WITH TOUJEO PEN, Disp: , Rfl:     benztropine (COGENTIN) 0 5 mg tablet, Take one tablet po bid, Disp: , Rfl:     citalopram (CeleXA) 20 mg tablet, Take one  tablet po daily, Disp: , Rfl:     cloZAPine (CLOZARIL) 100 mg tablet, Take 100 mg by mouth daily Takes one 200mg tab and one 100mg tab at bedtime = 300mg, Disp: , Rfl:     clozapine (CLOZARIL) 200 MG tablet, , Disp: , Rfl:     clozapine (CLOZARIL) 50 MG tablet, , Disp: , Rfl:    ergocalciferol (VITAMIN D2) 50,000 units, Take 50,000 Units by mouth once a week, Disp: , Rfl:     FLUoxetine (PROzac) 20 mg capsule, Take 20 mg by mouth daily, Disp: , Rfl:     lithium 300 MG tablet, Take 300 mg by mouth 2 (two) times a day, Disp: , Rfl:     meloxicam (MOBIC) 7 5 mg tablet, meloxicam 7 5 mg tablet, Disp: , Rfl:     metFORMIN (GLUCOPHAGE) 500 mg tablet, Take 500 mg by mouth daily with breakfast, Disp: , Rfl:     methylphenidate (RITALIN) 10 mg tablet, take 1 tablet by mouth twice a day MAXIMUM DAILY DOSE OF 20 milligram, Disp: , Rfl:     pantoprazole (PROTONIX) 40 mg tablet, Take 40 mg by mouth daily, Disp: , Rfl:     RA MELATONIN 3 MG, Take 3 mg by mouth every evening, Disp: , Rfl:     RA Melatonin 3-2 MG TABS, Take 1 tablet by mouth every evening, Disp: , Rfl:     sucralfate (CARAFATE) 1 g tablet, sucralfate 1 gram tablet, Disp: , Rfl:     TOUJEO SOLOSTAR 300 units/mL CONCETRATED U-300 injection pen (1-unit dial), inject 32 units subcutaneously once daily, Disp: , Rfl:     venlafaxine (EFFEXOR-XR) 75 mg 24 hr capsule, Take 75 mg by mouth daily, Disp: , Rfl:       Physical Exam:  /92 (BP Location: Left arm, Patient Position: Sitting, Cuff Size: Adult)   Pulse 82   Temp 98 9 °F (37 2 °C) (Tympanic)   Resp 18   Ht 6' (1 829 m)   Wt 92 6 kg (204 lb 3 2 oz)   SpO2 98%   BMI 27 69 kg/m²     Physical Exam  Constitutional:       Appearance: He is well-developed  HENT:      Head: Normocephalic and atraumatic  Eyes:      General: No scleral icterus  Right eye: No discharge  Left eye: No discharge  Conjunctiva/sclera: Conjunctivae normal       Pupils: Pupils are equal, round, and reactive to light  Neck:      Musculoskeletal: Normal range of motion and neck supple  Thyroid: No thyromegaly  Trachea: No tracheal deviation  Cardiovascular:      Rate and Rhythm: Normal rate and regular rhythm  Heart sounds: Normal heart sounds  No murmur   No friction rub  Pulmonary:      Effort: Pulmonary effort is normal  No respiratory distress  Breath sounds: Wheezing ( bilaterally) present  No rales  Chest:      Chest wall: No tenderness  Abdominal:      General: There is no distension  Palpations: Abdomen is soft  There is no hepatomegaly or splenomegaly  Tenderness: There is abdominal tenderness ( mild tendernessIn the right upper and the rest of the abdominal area)  There is no guarding or rebound  Hernia: A hernia (Mid abdominal wall hernia) is present  Musculoskeletal: Normal range of motion  General: No tenderness or deformity  Right lower leg: Edema ( Trace bilaterally) present  Left lower leg: Edema present  Lymphadenopathy:      Cervical: No cervical adenopathy  Skin:     General: Skin is warm and dry  Coloration: Skin is not pale  Findings: No erythema or rash  Neurological:      Mental Status: He is alert and oriented to person, place, and time  Cranial Nerves: No cranial nerve deficit  Coordination: Coordination normal       Deep Tendon Reflexes: Reflexes are normal and symmetric  Reflexes normal    Psychiatric:         Behavior: Behavior normal          Thought Content:  Thought content normal          Judgment: Judgment normal            Labs:  Lab Results   Component Value Date    WBC 4 90 01/15/2021    HGB 12 7 (L) 01/15/2021    HCT 39 5 (L) 01/15/2021    MCV 91 01/15/2021     (L) 01/15/2021     Lab Results   Component Value Date     04/17/2018    K 4 7 01/15/2021     (H) 01/15/2021    CO2 26 01/15/2021    ANIONGAP 10 04/17/2018    BUN 16 01/15/2021    CREATININE 1 15 01/15/2021    GLUCOSE 101 (H) 04/17/2018    GLUF 145 (H) 01/15/2021    CALCIUM 9 5 01/15/2021    AST 22 01/15/2021    ALT 21 01/15/2021    ALKPHOS 87 01/15/2021    PROT 6 6 04/17/2018    BILITOT 1 1 04/17/2018    EGFR 72 01/15/2021       Patient voiced understanding and agreement in the above discussion  Aware to contact our office with questions/symptoms in the interim

## 2021-02-16 ENCOUNTER — HOSPITAL ENCOUNTER (OUTPATIENT)
Dept: INFUSION CENTER | Facility: HOSPITAL | Age: 56
Discharge: HOME | End: 2021-02-16
Attending: INTERNAL MEDICINE
Payer: COMMERCIAL

## 2021-02-16 DIAGNOSIS — D51.8 OTHER VITAMIN B12 DEFICIENCY ANEMIAS: Primary | ICD-10-CM

## 2021-02-16 PROCEDURE — 96372 THER/PROPH/DIAG INJ SC/IM: CPT

## 2021-02-16 RX ORDER — CYANOCOBALAMIN 1000 UG/ML
1000 INJECTION INTRAMUSCULAR; SUBCUTANEOUS ONCE
Status: CANCELLED | OUTPATIENT
Start: 2021-03-16

## 2021-02-16 RX ORDER — CYANOCOBALAMIN 1000 UG/ML
1000 INJECTION INTRAMUSCULAR; SUBCUTANEOUS ONCE
Status: COMPLETED | OUTPATIENT
Start: 2021-02-16 | End: 2021-02-16

## 2021-02-16 RX ADMIN — CYANOCOBALAMIN 1000 MCG: 1000 INJECTION INTRAMUSCULAR; SUBCUTANEOUS at 10:36

## 2021-02-16 NOTE — PROGRESS NOTES
Pt tolerated B12 today with no c/o  AVS provided  Left unit ambulatory with a steady gait using cane

## 2021-03-16 ENCOUNTER — HOSPITAL ENCOUNTER (OUTPATIENT)
Dept: INFUSION CENTER | Facility: HOSPITAL | Age: 56
Discharge: HOME | End: 2021-03-16
Attending: INTERNAL MEDICINE
Payer: COMMERCIAL

## 2021-03-16 DIAGNOSIS — D51.8 OTHER VITAMIN B12 DEFICIENCY ANEMIAS: Primary | ICD-10-CM

## 2021-03-16 PROCEDURE — 96372 THER/PROPH/DIAG INJ SC/IM: CPT

## 2021-03-16 RX ORDER — CYANOCOBALAMIN 1000 UG/ML
1000 INJECTION INTRAMUSCULAR; SUBCUTANEOUS ONCE
Status: COMPLETED | OUTPATIENT
Start: 2021-03-16 | End: 2021-03-16

## 2021-03-16 RX ORDER — CYANOCOBALAMIN 1000 UG/ML
1000 INJECTION INTRAMUSCULAR; SUBCUTANEOUS ONCE
Status: CANCELLED | OUTPATIENT
Start: 2021-04-13

## 2021-03-16 RX ADMIN — CYANOCOBALAMIN 1000 MCG: 1000 INJECTION INTRAMUSCULAR; SUBCUTANEOUS at 10:42

## 2021-03-16 NOTE — PLAN OF CARE
Problem: Potential for Falls  Goal: Patient will remain free of falls  Description: INTERVENTIONS:  - Assess patient frequently for physical needs  -  Identify cognitive and physical deficits and behaviors that affect risk of falls  -  [place] fall precautions as indicated by assessment   - Educate patient/family on patient safety including physical limitations  - Instruct patient to call for assistance with activity based on assessment  - Modify environment to reduce risk of injury  - Consider OT/PT consult to assist with strengthening/mobility  Outcome: Progressing     Problem: Knowledge Deficit  Goal: Patient/family/caregiver demonstrates understanding of disease process, treatment plan, medications, and discharge instructions  Description: Complete learning assessment and assess knowledge base    Interventions:  - Provide teaching at level of understanding  - Provide teaching via preferred learning methods  Outcome: Progressing

## 2021-03-30 DIAGNOSIS — Z23 ENCOUNTER FOR IMMUNIZATION: ICD-10-CM

## 2021-04-13 ENCOUNTER — HOSPITAL ENCOUNTER (OUTPATIENT)
Dept: INFUSION CENTER | Facility: HOSPITAL | Age: 56
Discharge: HOME | End: 2021-04-13
Attending: INTERNAL MEDICINE
Payer: COMMERCIAL

## 2021-04-13 VITALS — TEMPERATURE: 98.2 F

## 2021-04-13 DIAGNOSIS — D51.8 OTHER VITAMIN B12 DEFICIENCY ANEMIAS: Primary | ICD-10-CM

## 2021-04-13 PROCEDURE — 96372 THER/PROPH/DIAG INJ SC/IM: CPT

## 2021-04-13 RX ORDER — CYANOCOBALAMIN 1000 UG/ML
1000 INJECTION INTRAMUSCULAR; SUBCUTANEOUS ONCE
Status: COMPLETED | OUTPATIENT
Start: 2021-04-13 | End: 2021-04-13

## 2021-04-13 RX ORDER — CYANOCOBALAMIN 1000 UG/ML
1000 INJECTION INTRAMUSCULAR; SUBCUTANEOUS ONCE
Status: CANCELLED | OUTPATIENT
Start: 2021-05-11

## 2021-04-13 RX ADMIN — CYANOCOBALAMIN 1000 MCG: 1000 INJECTION INTRAMUSCULAR; SUBCUTANEOUS at 09:29

## 2021-04-13 NOTE — PROGRESS NOTES
Patient tolerated B12 injection via right deltoid  Next appt made  AVS provided  Left ambulatory with cane to await [facility] transport

## 2021-04-13 NOTE — PLAN OF CARE
Problem: Potential for Falls  Goal: Patient will remain free of falls  Description: INTERVENTIONS:  - Assess patient frequently for physical needs  -  Identify cognitive and physical deficits and behaviors that affect risk of falls  -  Bells fall precautions as indicated by assessment   - Educate patient/family on patient safety including physical limitations  - Instruct patient to call for assistance with activity based on assessment  - Modify environment to reduce risk of injury  - Consider OT/PT consult to assist with strengthening/mobility  Outcome: Progressing     Problem: Knowledge Deficit  Goal: Patient/family/caregiver demonstrates understanding of disease process, treatment plan, medications, and discharge instructions  Description: Complete learning assessment and assess knowledge base    Interventions:  - Provide teaching at level of understanding  - Provide teaching via preferred learning methods  Outcome: Progressing

## 2021-05-11 ENCOUNTER — HOSPITAL ENCOUNTER (OUTPATIENT)
Dept: INFUSION CENTER | Facility: HOSPITAL | Age: 56
Discharge: HOME | End: 2021-05-11
Attending: INTERNAL MEDICINE
Payer: COMMERCIAL

## 2021-05-11 DIAGNOSIS — D51.8 OTHER VITAMIN B12 DEFICIENCY ANEMIAS: Primary | ICD-10-CM

## 2021-05-11 PROCEDURE — 96372 THER/PROPH/DIAG INJ SC/IM: CPT

## 2021-05-11 RX ORDER — CYANOCOBALAMIN 1000 UG/ML
1000 INJECTION INTRAMUSCULAR; SUBCUTANEOUS ONCE
Status: COMPLETED | OUTPATIENT
Start: 2021-05-11 | End: 2021-05-11

## 2021-05-11 RX ORDER — CYANOCOBALAMIN 1000 UG/ML
1000 INJECTION INTRAMUSCULAR; SUBCUTANEOUS ONCE
Status: CANCELLED | OUTPATIENT
Start: 2021-06-08

## 2021-05-11 RX ADMIN — CYANOCOBALAMIN 1000 MCG: 1000 INJECTION INTRAMUSCULAR; SUBCUTANEOUS at 10:02

## 2021-05-11 NOTE — PROGRESS NOTES
Pt tolerated B12 injection to R deltoid without difficulty  Next appt scheduled and AVS provided  Left ambulatory for [facility] transport

## 2021-06-08 ENCOUNTER — HOSPITAL ENCOUNTER (OUTPATIENT)
Dept: INFUSION CENTER | Facility: HOSPITAL | Age: 56
Discharge: HOME | End: 2021-06-08
Attending: INTERNAL MEDICINE
Payer: COMMERCIAL

## 2021-06-08 DIAGNOSIS — D51.8 OTHER VITAMIN B12 DEFICIENCY ANEMIAS: Primary | ICD-10-CM

## 2021-06-08 PROCEDURE — 96372 THER/PROPH/DIAG INJ SC/IM: CPT

## 2021-06-08 RX ORDER — CYANOCOBALAMIN 1000 UG/ML
1000 INJECTION INTRAMUSCULAR; SUBCUTANEOUS ONCE
Status: COMPLETED | OUTPATIENT
Start: 2021-06-08 | End: 2021-06-08

## 2021-06-08 RX ORDER — CYANOCOBALAMIN 1000 UG/ML
1000 INJECTION INTRAMUSCULAR; SUBCUTANEOUS ONCE
Status: CANCELLED | OUTPATIENT
Start: 2021-07-06

## 2021-06-08 RX ADMIN — CYANOCOBALAMIN 1000 MCG: 1000 INJECTION INTRAMUSCULAR; SUBCUTANEOUS at 09:10

## 2021-06-08 NOTE — PROGRESS NOTES
Pt here for vit b12, tolerated well to right deltoid IM without complications, made next appt, star confirmed, AVS provided

## 2021-07-06 ENCOUNTER — HOSPITAL ENCOUNTER (OUTPATIENT)
Dept: INFUSION CENTER | Facility: HOSPITAL | Age: 56
Discharge: HOME | End: 2021-07-06
Attending: INTERNAL MEDICINE
Payer: COMMERCIAL

## 2021-07-06 VITALS — TEMPERATURE: 98.4 F

## 2021-07-06 DIAGNOSIS — D51.8 OTHER VITAMIN B12 DEFICIENCY ANEMIAS: Primary | ICD-10-CM

## 2021-07-06 PROCEDURE — 96372 THER/PROPH/DIAG INJ SC/IM: CPT

## 2021-07-06 RX ORDER — CYANOCOBALAMIN 1000 UG/ML
1000 INJECTION INTRAMUSCULAR; SUBCUTANEOUS ONCE
Status: CANCELLED | OUTPATIENT
Start: 2021-08-03

## 2021-07-06 RX ORDER — CYANOCOBALAMIN 1000 UG/ML
1000 INJECTION INTRAMUSCULAR; SUBCUTANEOUS ONCE
Status: COMPLETED | OUTPATIENT
Start: 2021-07-06 | End: 2021-07-06

## 2021-07-06 RX ADMIN — CYANOCOBALAMIN 1000 MCG: 1000 INJECTION INTRAMUSCULAR; SUBCUTANEOUS at 09:34

## 2021-07-06 NOTE — PROGRESS NOTES
Pt tolerated vit b12 to right deltoid without complications  Made next appt, [facility] aware, AVS provided

## 2021-07-10 ENCOUNTER — APPOINTMENT (OUTPATIENT)
Dept: LAB | Facility: HOSPITAL | Age: 56
End: 2021-07-10
Attending: INTERNAL MEDICINE
Payer: COMMERCIAL

## 2021-07-10 DIAGNOSIS — D69.6 THROMBOCYTOPENIA (HCC): ICD-10-CM

## 2021-07-10 DIAGNOSIS — I10 HYPERTENSION, UNSPECIFIED TYPE: Primary | ICD-10-CM

## 2021-07-10 DIAGNOSIS — K74.60 CIRRHOSIS OF LIVER WITHOUT ASCITES, UNSPECIFIED HEPATIC CIRRHOSIS TYPE (HCC): ICD-10-CM

## 2021-07-10 DIAGNOSIS — D64.9 ANEMIA, UNSPECIFIED TYPE: ICD-10-CM

## 2021-07-10 DIAGNOSIS — E11.9 TYPE 2 DIABETES MELLITUS WITHOUT COMPLICATION, WITHOUT LONG-TERM CURRENT USE OF INSULIN (HCC): ICD-10-CM

## 2021-07-10 LAB
25(OH)D3 SERPL-MCNC: 34.1 NG/ML (ref 30–100)
AFP-TM SERPL-MCNC: 1.2 NG/ML (ref 0.5–8)
ALBUMIN SERPL BCP-MCNC: 4.1 G/DL (ref 3–5.2)
ALP SERPL-CCNC: 72 U/L (ref 43–122)
ALT SERPL W P-5'-P-CCNC: 18 U/L
ANION GAP SERPL CALCULATED.3IONS-SCNC: 9 MMOL/L (ref 5–14)
AST SERPL W P-5'-P-CCNC: 23 U/L (ref 17–59)
BASOPHILS # BLD AUTO: 0 THOUSANDS/ΜL (ref 0–0.1)
BASOPHILS NFR BLD AUTO: 1 % (ref 0–1)
BILIRUB SERPL-MCNC: 0.76 MG/DL
BUN SERPL-MCNC: 10 MG/DL (ref 5–25)
CALCIUM SERPL-MCNC: 9.3 MG/DL (ref 8.4–10.2)
CHLORIDE SERPL-SCNC: 110 MMOL/L (ref 97–108)
CHOLEST SERPL-MCNC: 163 MG/DL
CO2 SERPL-SCNC: 22 MMOL/L (ref 22–30)
CREAT SERPL-MCNC: 1.05 MG/DL (ref 0.7–1.5)
CRP SERPL QL: <5 MG/L
EOSINOPHIL # BLD AUTO: 0 THOUSAND/ΜL (ref 0–0.4)
EOSINOPHIL NFR BLD AUTO: 0 % (ref 0–6)
ERYTHROCYTE [DISTWIDTH] IN BLOOD BY AUTOMATED COUNT: 12.7 %
ERYTHROCYTE [SEDIMENTATION RATE] IN BLOOD: 13 MM/HOUR (ref 0–19)
EST. AVERAGE GLUCOSE BLD GHB EST-MCNC: 148 MG/DL
FERRITIN SERPL-MCNC: 128 NG/ML (ref 8–388)
GFR SERPL CREATININE-BSD FRML MDRD: 80 ML/MIN/1.73SQ M
GLUCOSE P FAST SERPL-MCNC: 125 MG/DL (ref 70–99)
HBA1C MFR BLD: 6.8 %
HCT VFR BLD AUTO: 36.5 % (ref 41–53)
HDLC SERPL-MCNC: 39 MG/DL
HGB BLD-MCNC: 12.3 G/DL (ref 13.5–17.5)
IRON SATN MFR SERPL: 37 %
IRON SERPL-MCNC: 104 UG/DL (ref 65–175)
LDH SERPL-CCNC: 304 U/L (ref 313–618)
LDLC SERPL CALC-MCNC: 101 MG/DL
LYMPHOCYTES # BLD AUTO: 1.1 THOUSANDS/ΜL (ref 0.5–4)
LYMPHOCYTES NFR BLD AUTO: 22 % (ref 25–45)
MCH RBC QN AUTO: 30.7 PG (ref 26–34)
MCHC RBC AUTO-ENTMCNC: 33.8 G/DL (ref 31–36)
MCV RBC AUTO: 91 FL (ref 80–100)
MONOCYTES # BLD AUTO: 0.4 THOUSAND/ΜL (ref 0.2–0.9)
MONOCYTES NFR BLD AUTO: 9 % (ref 1–10)
NEUTROPHILS # BLD AUTO: 3.4 THOUSANDS/ΜL (ref 1.8–7.8)
NEUTS SEG NFR BLD AUTO: 68 % (ref 45–65)
NONHDLC SERPL-MCNC: 124 MG/DL
PLATELET # BLD AUTO: 127 THOUSANDS/UL (ref 150–450)
PMV BLD AUTO: 8.7 FL (ref 8.9–12.7)
POTASSIUM SERPL-SCNC: 4.3 MMOL/L (ref 3.6–5)
PROT SERPL-MCNC: 6.6 G/DL (ref 5.9–8.4)
RBC # BLD AUTO: 4.02 MILLION/UL (ref 4.5–5.9)
RETICS # CALC: 1.51 % (ref 0.87–2.63)
SODIUM SERPL-SCNC: 141 MMOL/L (ref 137–147)
TIBC SERPL-MCNC: 284 UG/DL (ref 250–450)
TRIGL SERPL-MCNC: 113 MG/DL
VIT B12 SERPL-MCNC: 843 PG/ML (ref 100–900)
WBC # BLD AUTO: 5 THOUSAND/UL (ref 4.5–11)

## 2021-07-10 PROCEDURE — 83036 HEMOGLOBIN GLYCOSYLATED A1C: CPT

## 2021-07-10 PROCEDURE — 83615 LACTATE (LD) (LDH) ENZYME: CPT

## 2021-07-10 PROCEDURE — 80053 COMPREHEN METABOLIC PANEL: CPT

## 2021-07-10 PROCEDURE — 86140 C-REACTIVE PROTEIN: CPT

## 2021-07-10 PROCEDURE — 83550 IRON BINDING TEST: CPT

## 2021-07-10 PROCEDURE — 83540 ASSAY OF IRON: CPT

## 2021-07-10 PROCEDURE — 80061 LIPID PANEL: CPT

## 2021-07-10 PROCEDURE — 85045 AUTOMATED RETICULOCYTE COUNT: CPT

## 2021-07-10 PROCEDURE — 85025 COMPLETE CBC W/AUTO DIFF WBC: CPT

## 2021-07-10 PROCEDURE — 82105 ALPHA-FETOPROTEIN SERUM: CPT

## 2021-07-10 PROCEDURE — 36415 COLL VENOUS BLD VENIPUNCTURE: CPT

## 2021-07-10 PROCEDURE — 82306 VITAMIN D 25 HYDROXY: CPT

## 2021-07-10 PROCEDURE — 82728 ASSAY OF FERRITIN: CPT

## 2021-07-10 PROCEDURE — 82607 VITAMIN B-12: CPT

## 2021-07-10 PROCEDURE — 85652 RBC SED RATE AUTOMATED: CPT

## 2021-07-15 ENCOUNTER — TELEPHONE (OUTPATIENT)
Dept: HEMATOLOGY ONCOLOGY | Facility: CLINIC | Age: 56
End: 2021-07-15

## 2021-07-15 NOTE — TELEPHONE ENCOUNTER
Appointment Confirmation     Appointment with  Carmen Kam   Appointment date & time 07/16/2021   Location [facility]   Patient verbilized Understanding  yes

## 2021-07-16 ENCOUNTER — OFFICE VISIT (OUTPATIENT)
Dept: HEMATOLOGY ONCOLOGY | Facility: CLINIC | Age: 56
End: 2021-07-16
Payer: COMMERCIAL

## 2021-07-16 VITALS
TEMPERATURE: 97.6 F | HEIGHT: 72 IN | WEIGHT: 213 LBS | DIASTOLIC BLOOD PRESSURE: 88 MMHG | OXYGEN SATURATION: 98 % | HEART RATE: 71 BPM | BODY MASS INDEX: 28.85 KG/M2 | SYSTOLIC BLOOD PRESSURE: 122 MMHG | RESPIRATION RATE: 16 BRPM

## 2021-07-16 DIAGNOSIS — K74.60 CIRRHOSIS OF LIVER WITHOUT ASCITES, UNSPECIFIED HEPATIC CIRRHOSIS TYPE (HCC): ICD-10-CM

## 2021-07-16 DIAGNOSIS — R60.0 EDEMA OF BOTH LOWER LEGS: ICD-10-CM

## 2021-07-16 DIAGNOSIS — D51.8 OTHER VITAMIN B12 DEFICIENCY ANEMIAS: ICD-10-CM

## 2021-07-16 DIAGNOSIS — D64.9 NORMOCYTIC ANEMIA: ICD-10-CM

## 2021-07-16 DIAGNOSIS — D69.6 THROMBOCYTOPENIA (HCC): Primary | ICD-10-CM

## 2021-07-16 PROCEDURE — 99214 OFFICE O/P EST MOD 30 MIN: CPT | Performed by: NURSE PRACTITIONER

## 2021-07-16 NOTE — PROGRESS NOTES
Hematology/Oncology Outpatient Follow-up  Delaney Gamez 55 y o  male 1965 [id number]    Date:  7/16/2021      Assessment and Plan:  1  Thrombocytopenia ([address] )  Patient continues to have mild chronic stable thrombocytopenia which is secondary to his liver cirrhosis/splenomegaly  He continues to be within his baseline which tends to run around 100,000-130,000  we will continue to monitor patient and his laboratory studies closely on an every 4-6 month basis  - CBC and differential; Future  - Comprehensive metabolic panel; Future  - LD,Blood; Future    2  Normocytic anemia  Patient continues to have chronic mild normocytic anemia hemoglobin 12 3  He denies any obvious hint of bleeding  Iron panel and vitamin B12 are appropriate  Suspect anemia of chronic disease  Additional workup will be pursued before his next office visit  - CBC and differential; Future  - Comprehensive metabolic panel; Future  - C-reactive protein; Future  - Sedimentation rate, automated; Future  - Vitamin B12; Future  - Iron Panel (Includes Ferritin, Iron Sat%, Iron, and TIBC); Future  - Ferritin; Future  - IgG, IgA, IgM; Future  - Immunoglobulin free LT chains blood; Future  - Protein electrophoresis, serum; Future  - Erythropoietin; Future  - Direct antiglobulin test; Future  - Reticulocytes; Future  - LD,Blood; Future  - Folate; Future  - Haptoglobin; Future  - Uric acid; Future    3  Other vitamin B12 deficiency anemias  He will continue his monthly vitamin B12 injections which are maintaining his levels in the normal range  - Vitamin B12; Future    4  Cirrhosis of liver without ascites, unspecified hepatic cirrhosis type [facility])  The patient admits that he has not seen his GI/liver specialist in over a year  He follows with [facility]  Alpha fetoprotein came back normal   He has gained 9 lb since his last office visit which may be due to worsening ascites as his abdomen appears more distended   I did advise him to call and make an appointment with his hepatology team today; offered to schedule for him but he states he will do it when he gets home  He has their phone number memorized  I also encouraged him to discuss his chronic diarrhea with them  Advised him to trial Imodium in the interim instead of the Pepto-Bismol     - AFP tumor marker; Future    5  Edema of both lower legs  Patient was noted to have bilateral lower extremity edema on exam today  He reports that this has been ongoing in may be due to his liver cirrhosis /ascites  The right leg definitely is larger than the left  For completeness sake we will pursue a venous duplex of the bilateral lower extremities to rule out DVT  - VAS lower limb venous duplex study, complete bilateral; Future      HPI:  The patient has multiple comorbid conditions including heart arrhythmia, type 2 diabetes mellitus, liver cirrhosis due to fatty liver, history of arthritis, or gout, hyperuricemia, hypothyroidism, schizoaffective disorder, anxiety, depression, etc      He had extensive workup for his mild chronic stable thrombocytopenia which goes back at least to 2015  He also was found to have vitamin B12 deficiency which was corrected with monthly vitamin B12 injections   The workup was also negative for any hint of monoclonal gammopathy       Interval history:  Patient presents today for a follow-up visit  He continues to report ongoing fatigue which is  Bothersome  States that he also has been having mild diarrhea 1-2 episodes daily for the past several months; is using Pepto-Bismol  Reports some mild tenderness in his abdomen  No new complaints otherwise  Denies any obvious bleeding from any site  He continues to get his vitamin B12 injections on a monthly basis  His most recent laboratory studies from 07/10/2021 showed normal WBC 5 0  He has had  mild drop of his hemoglobin but stable H&H 12 3/36 5, MCV 91, he continues to have chronic stable thrombocytopenia platelet count a 691956  Glucose 125 remaining metabolic panel is in the normal range  Inflammatory markers are normal   LDH is slightly below average 304  His alpha fetoprotein is in the normal range 1 2  Is not iron deficient iron saturation 37% ferritin 128  B12 is appropriate 843  ROS: Review of Systems   Constitutional: Positive for fatigue  Negative for activity change, appetite change, chills, fever and unexpected weight change  HENT: Positive for trouble swallowing  Negative for congestion, mouth sores, nosebleeds and sore throat  Eyes: Negative  Respiratory: Negative for cough, chest tightness and shortness of breath  Cardiovascular: Positive for leg swelling  Negative for chest pain and palpitations  Gastrointestinal: Positive for abdominal distention, abdominal pain and diarrhea  Negative for blood in stool, constipation, nausea and vomiting  Genitourinary: Negative for difficulty urinating, dysuria, frequency, hematuria and urgency  Musculoskeletal: Positive for gait problem ( ambulates with a cane)  Negative for arthralgias, back pain, joint swelling and myalgias  Skin: Negative for color change, pallor and rash  Neurological: Positive for headaches  Negative for dizziness, weakness, light-headedness and numbness  Hematological: Negative for adenopathy  Does not bruise/bleed easily  Psychiatric/Behavioral: Positive for sleep disturbance  Negative for dysphoric mood  The patient is not nervous/anxious  Past Medical History:   Diagnosis Date    Bipolar disorder ([place] [address] )     Diabetes mellitus, type II ([place] [address] )     PAT (paroxysmal atrial tachycardia) ([place] [address] )     Schizophrenic disorder ([place] [address] )        No past surgical history on file      Social History     Socioeconomic History    Marital status: Single     Spouse name: None    Number of children: None    Years of education: None    Highest education level: None   Occupational History    None   Tobacco Use    Smoking status: Current Every Day Smoker    Smokeless tobacco: Never Used   Substance and Sexual Activity    Alcohol use: No    Drug use: No    Sexual activity: None   Other Topics Concern    None   Social History Narrative    None     Social Determinants of Health     Financial Resource Strain:     Difficulty of Paying Living Expenses:    Food Insecurity:     Worried About Running Out of Food in the Last Year:     Ran Out of Food in the Last Year:    Transportation Needs:     Lack of Transportation (Medical):      Lack of Transportation (Non-Medical):    Physical Activity:     Days of Exercise per Week:     Minutes of Exercise per Session:    Stress:     Feeling of Stress :    Social Connections:     Frequency of Communication with Friends and Family:     Frequency of Social Gatherings with Friends and Family:     Attends Hinduism Services:     Active Member of Clubs or Organizations:     Attends Club or Organization Meetings:     Marital Status:    Intimate Partner Violence:     Fear of Current or Ex-Partner:     Emotionally Abused:     Physically Abused:     Sexually Abused:        Family History   Problem Relation Age of Onset    Coronary artery disease Mother         premature       Allergies   Allergen Reactions    Bupropion     Chlorproethazine     Molindone     Paliperidone     Thiazide-Type Diuretics          Current Outpatient Medications:     allopurinol (ZYLOPRIM) 100 mg tablet, Take 100 mg by mouth daily, Disp: , Rfl:     ALPRAZolam (XANAX) 0 5 mg tablet, Take by mouth 3 (three) times a day, Disp: , Rfl:     BD PEN NEEDLE TREV U/F 32G X 4 MM MISC, use once daily WITH TOUJEO PEN, Disp: , Rfl:     benztropine (COGENTIN) 0 5 mg tablet, Take one tablet po bid, Disp: , Rfl:     citalopram (CeleXA) 20 mg tablet, Take one  tablet po daily, Disp: , Rfl:     cloZAPine (CLOZARIL) 100 mg tablet, Take 100 mg by mouth daily Takes one 200mg tab and one 100mg tab at bedtime = 300mg, Disp: , Rfl:     clozapine (CLOZARIL) 200 MG tablet, , Disp: , Rfl:     ergocalciferol (VITAMIN D2) 50,000 units, Take 50,000 Units by mouth once a week, Disp: , Rfl:     FLUoxetine (PROzac) 20 mg capsule, Take 20 mg by mouth daily, Disp: , Rfl:     lithium 300 MG tablet, Take 300 mg by mouth 2 (two) times a day, Disp: , Rfl:     meloxicam (MOBIC) 7 5 mg tablet, meloxicam 7 5 mg tablet, Disp: , Rfl:     metFORMIN (GLUCOPHAGE) 500 mg tablet, Take 500 mg by mouth daily with breakfast, Disp: , Rfl:     methylphenidate (RITALIN) 10 mg tablet, take 1 tablet by mouth twice a day MAXIMUM DAILY DOSE OF 20 milligram, Disp: , Rfl:     pantoprazole (PROTONIX) 40 mg tablet, Take 40 mg by mouth daily, Disp: , Rfl:     RA MELATONIN 3 MG, Take 3 mg by mouth every evening, Disp: , Rfl:     RA Melatonin 3-2 MG TABS, Take 1 tablet by mouth every evening, Disp: , Rfl:     sertraline (ZOLOFT) 50 mg tablet, Take 50 mg by mouth daily, Disp: , Rfl:     sucralfate (CARAFATE) 1 g tablet, sucralfate 1 gram tablet, Disp: , Rfl:     TOUJEO SOLOSTAR 300 units/mL CONCETRATED U-300 injection pen (1-unit dial), inject 32 units subcutaneously once daily, Disp: , Rfl:     venlafaxine (EFFEXOR-XR) 75 mg 24 hr capsule, Take 75 mg by mouth daily, Disp: , Rfl:     clozapine (CLOZARIL) 50 MG tablet, , Disp: , Rfl:       Physical Exam:  /88 (BP Location: Left arm, Patient Position: Sitting, Cuff Size: Adult)   Pulse 71   Temp 97 6 °F (36 4 °C) (Tympanic)   Resp 16   Ht 6' (1 829 m)   Wt 96 6 kg (213 lb)   SpO2 98%   BMI 28 89 kg/m²     Physical Exam  Vitals reviewed  Constitutional:       General: He is not in acute distress  Appearance: He is well-developed  He is not diaphoretic  HENT:      Head: Normocephalic and atraumatic  Eyes:      General: Lids are normal  No scleral icterus  Conjunctiva/sclera: Conjunctivae normal       Pupils: Pupils are equal, round, and reactive to light  Neck:      Thyroid: No thyromegaly     Cardiovascular: Rate and Rhythm: Normal rate and regular rhythm  Heart sounds: Normal heart sounds  No murmur heard  Pulmonary:      Effort: Pulmonary effort is normal  No respiratory distress  Breath sounds: Normal breath sounds  Abdominal:      General: There is distension (ascites)  Palpations: Abdomen is soft  There is hepatomegaly (massive) and splenomegaly  Tenderness: There is no abdominal tenderness  Hernia: A hernia is present  Hernia is present in the ventral area  Musculoskeletal:         General: No swelling  Normal range of motion  Cervical back: Normal range of motion and neck supple  Right lower leg: Edema (+2) present  Left lower leg: Edema (+1) present  Lymphadenopathy:      Cervical: No cervical adenopathy  Upper Body:      Right upper body: No axillary adenopathy  Left upper body: No axillary adenopathy  Skin:     General: Skin is warm and dry  Coloration: Skin is pale  Findings: No erythema or rash  Comments: Noted scabbed lesion above left lip   Neurological:      General: No focal deficit present  Mental Status: He is alert and oriented to person, place, and time  Psychiatric:         Mood and Affect: Mood normal  Affect is flat  Behavior: Behavior normal  Behavior is cooperative  Thought Content:  Thought content normal          Judgment: Judgment normal            Labs:  Lab Results   Component Value Date    WBC 5 00 07/10/2021    HGB 12 3 (L) 07/10/2021    HCT 36 5 (L) 07/10/2021    MCV 91 07/10/2021     (L) 07/10/2021     Lab Results   Component Value Date     04/17/2018    K 4 3 07/10/2021     (H) 07/10/2021    CO2 22 07/10/2021    ANIONGAP 10 04/17/2018    BUN 10 07/10/2021    CREATININE 1 05 07/10/2021    GLUCOSE 101 (H) 04/17/2018    GLUF 125 (H) 07/10/2021    CALCIUM 9 3 07/10/2021    AST 23 07/10/2021    ALT 18 07/10/2021    ALKPHOS 72 07/10/2021    PROT 6 6 04/17/2018    BILITOT 1 1 04/17/2018    EGFR 80 07/10/2021       Patient voiced understanding and agreement in the above discussion  Aware to contact our office with questions/symptoms in the interim  This note has been generated by voice recognition software system  Therefore, there may be spelling, grammar, and or syntax errors  Please contact if questions arise

## 2021-08-03 ENCOUNTER — HOSPITAL ENCOUNTER (OUTPATIENT)
Dept: INFUSION CENTER | Facility: HOSPITAL | Age: 56
Discharge: HOME | End: 2021-08-03
Attending: INTERNAL MEDICINE
Payer: COMMERCIAL

## 2021-08-03 DIAGNOSIS — D51.8 OTHER VITAMIN B12 DEFICIENCY ANEMIAS: Primary | ICD-10-CM

## 2021-08-03 PROCEDURE — 96372 THER/PROPH/DIAG INJ SC/IM: CPT

## 2021-08-03 RX ORDER — CYANOCOBALAMIN 1000 UG/ML
1000 INJECTION INTRAMUSCULAR; SUBCUTANEOUS ONCE
Status: COMPLETED | OUTPATIENT
Start: 2021-08-03 | End: 2021-08-03

## 2021-08-03 RX ORDER — CYANOCOBALAMIN 1000 UG/ML
1000 INJECTION INTRAMUSCULAR; SUBCUTANEOUS ONCE
Status: CANCELLED | OUTPATIENT
Start: 2021-08-31

## 2021-08-03 RX ADMIN — CYANOCOBALAMIN 1000 MCG: 1000 INJECTION INTRAMUSCULAR; SUBCUTANEOUS at 09:34

## 2021-08-03 NOTE — PROGRESS NOTES
Pt tolerated B12 injection to R deltoid without difficulty  Next appt confirmed  Left ambulatory with [facility] transport

## 2021-08-31 ENCOUNTER — HOSPITAL ENCOUNTER (OUTPATIENT)
Dept: INFUSION CENTER | Facility: HOSPITAL | Age: 56
Discharge: HOME | End: 2021-08-31
Attending: INTERNAL MEDICINE
Payer: COMMERCIAL

## 2021-08-31 DIAGNOSIS — D51.8 OTHER VITAMIN B12 DEFICIENCY ANEMIAS: Primary | ICD-10-CM

## 2021-08-31 PROCEDURE — 96372 THER/PROPH/DIAG INJ SC/IM: CPT

## 2021-08-31 RX ORDER — CYANOCOBALAMIN 1000 UG/ML
1000 INJECTION INTRAMUSCULAR; SUBCUTANEOUS ONCE
Status: COMPLETED | OUTPATIENT
Start: 2021-08-31 | End: 2021-08-31

## 2021-08-31 RX ORDER — CYANOCOBALAMIN 1000 UG/ML
1000 INJECTION INTRAMUSCULAR; SUBCUTANEOUS ONCE
Status: CANCELLED | OUTPATIENT
Start: 2021-09-28

## 2021-08-31 RX ADMIN — CYANOCOBALAMIN 1000 MCG: 1000 INJECTION INTRAMUSCULAR; SUBCUTANEOUS at 10:19

## 2021-09-09 ENCOUNTER — HOSPITAL ENCOUNTER (OUTPATIENT)
Dept: CT IMAGING | Facility: HOSPITAL | Age: 56
Discharge: HOME | End: 2021-09-09
Attending: FAMILY MEDICINE
Payer: COMMERCIAL

## 2021-09-09 DIAGNOSIS — R10.9 ABDOMINAL PAIN, UNSPECIFIED ABDOMINAL LOCATION: ICD-10-CM

## 2021-09-09 PROCEDURE — G1004 CDSM NDSC: HCPCS

## 2021-09-09 PROCEDURE — 74150 CT ABDOMEN W/O CONTRAST: CPT

## 2021-09-28 ENCOUNTER — HOSPITAL ENCOUNTER (OUTPATIENT)
Dept: INFUSION CENTER | Facility: HOSPITAL | Age: 56
Discharge: HOME | End: 2021-09-28
Attending: INTERNAL MEDICINE
Payer: COMMERCIAL

## 2021-09-28 DIAGNOSIS — D51.8 OTHER VITAMIN B12 DEFICIENCY ANEMIAS: Primary | ICD-10-CM

## 2021-09-28 PROCEDURE — 96372 THER/PROPH/DIAG INJ SC/IM: CPT

## 2021-09-28 RX ORDER — CYANOCOBALAMIN 1000 UG/ML
1000 INJECTION INTRAMUSCULAR; SUBCUTANEOUS ONCE
Status: COMPLETED | OUTPATIENT
Start: 2021-09-28 | End: 2021-09-28

## 2021-09-28 RX ORDER — CYANOCOBALAMIN 1000 UG/ML
1000 INJECTION INTRAMUSCULAR; SUBCUTANEOUS ONCE
Status: CANCELLED | OUTPATIENT
Start: 2021-10-26

## 2021-09-28 RX ADMIN — CYANOCOBALAMIN 1000 MCG: 1000 INJECTION INTRAMUSCULAR; SUBCUTANEOUS at 10:39

## 2021-10-26 ENCOUNTER — HOSPITAL ENCOUNTER (OUTPATIENT)
Dept: INFUSION CENTER | Facility: HOSPITAL | Age: 56
Discharge: HOME | End: 2021-10-26
Attending: INTERNAL MEDICINE
Payer: COMMERCIAL

## 2021-10-26 DIAGNOSIS — D51.8 OTHER VITAMIN B12 DEFICIENCY ANEMIAS: Primary | ICD-10-CM

## 2021-10-26 PROCEDURE — 96372 THER/PROPH/DIAG INJ SC/IM: CPT

## 2021-10-26 RX ORDER — CYANOCOBALAMIN 1000 UG/ML
1000 INJECTION INTRAMUSCULAR; SUBCUTANEOUS ONCE
Status: COMPLETED | OUTPATIENT
Start: 2021-10-26 | End: 2021-10-26

## 2021-10-26 RX ORDER — CYANOCOBALAMIN 1000 UG/ML
1000 INJECTION INTRAMUSCULAR; SUBCUTANEOUS ONCE
Status: CANCELLED | OUTPATIENT
Start: 2021-11-23

## 2021-10-26 RX ADMIN — CYANOCOBALAMIN 1000 MCG: 1000 INJECTION INTRAMUSCULAR; SUBCUTANEOUS at 09:49

## 2021-11-23 ENCOUNTER — HOSPITAL ENCOUNTER (OUTPATIENT)
Dept: INFUSION CENTER | Facility: HOSPITAL | Age: 56
Discharge: HOME | End: 2021-11-23
Attending: INTERNAL MEDICINE
Payer: COMMERCIAL

## 2021-11-23 DIAGNOSIS — D51.8 OTHER VITAMIN B12 DEFICIENCY ANEMIAS: Primary | ICD-10-CM

## 2021-11-23 PROCEDURE — 96372 THER/PROPH/DIAG INJ SC/IM: CPT

## 2021-11-23 RX ORDER — CYANOCOBALAMIN 1000 UG/ML
1000 INJECTION INTRAMUSCULAR; SUBCUTANEOUS ONCE
Status: COMPLETED | OUTPATIENT
Start: 2021-11-23 | End: 2021-11-23

## 2021-11-23 RX ORDER — CYANOCOBALAMIN 1000 UG/ML
1000 INJECTION INTRAMUSCULAR; SUBCUTANEOUS ONCE
OUTPATIENT
Start: 2021-12-21

## 2021-11-23 RX ADMIN — CYANOCOBALAMIN 1000 MCG: 1000 INJECTION INTRAMUSCULAR; SUBCUTANEOUS at 11:13

## 2021-12-15 ENCOUNTER — TELEPHONE (OUTPATIENT)
Dept: HEMATOLOGY ONCOLOGY | Facility: CLINIC | Age: 56
End: 2021-12-15

## 2021-12-17 ENCOUNTER — TELEPHONE (OUTPATIENT)
Dept: HEMATOLOGY ONCOLOGY | Facility: CLINIC | Age: 56
End: 2021-12-17

## 2022-03-19 ENCOUNTER — APPOINTMENT (OUTPATIENT)
Dept: LAB | Facility: HOSPITAL | Age: 57
End: 2022-03-19
Attending: FAMILY MEDICINE
Payer: MEDICARE

## 2022-03-19 ENCOUNTER — TRANSCRIBE ORDERS (OUTPATIENT)
Dept: LAB | Facility: HOSPITAL | Age: 57
End: 2022-03-19

## 2022-03-19 DIAGNOSIS — D64.9 ANEMIA, UNSPECIFIED TYPE: ICD-10-CM

## 2022-03-19 DIAGNOSIS — E11.9 TYPE 2 DIABETES MELLITUS WITHOUT COMPLICATION, UNSPECIFIED WHETHER LONG TERM INSULIN USE (HCC): Primary | ICD-10-CM

## 2022-03-19 DIAGNOSIS — E11.9 TYPE 2 DIABETES MELLITUS WITHOUT COMPLICATION, UNSPECIFIED WHETHER LONG TERM INSULIN USE (HCC): ICD-10-CM

## 2022-03-19 LAB
ALBUMIN SERPL BCP-MCNC: 4 G/DL (ref 3–5.2)
ALP SERPL-CCNC: 93 U/L (ref 43–122)
ALT SERPL W P-5'-P-CCNC: 28 U/L
ANION GAP SERPL CALCULATED.3IONS-SCNC: 5 MMOL/L (ref 5–14)
AST SERPL W P-5'-P-CCNC: 35 U/L (ref 17–59)
BASOPHILS # BLD AUTO: 0 THOUSANDS/ΜL (ref 0–0.1)
BASOPHILS NFR BLD AUTO: 1 % (ref 0–1)
BILIRUB SERPL-MCNC: 0.61 MG/DL
BUN SERPL-MCNC: 19 MG/DL (ref 5–25)
CALCIUM SERPL-MCNC: 9 MG/DL (ref 8.4–10.2)
CHLORIDE SERPL-SCNC: 109 MMOL/L (ref 97–108)
CHOLEST SERPL-MCNC: 208 MG/DL
CO2 SERPL-SCNC: 27 MMOL/L (ref 22–30)
CREAT SERPL-MCNC: 0.99 MG/DL (ref 0.7–1.5)
EOSINOPHIL # BLD AUTO: 0 THOUSAND/ΜL (ref 0–0.4)
EOSINOPHIL NFR BLD AUTO: 0 % (ref 0–6)
ERYTHROCYTE [DISTWIDTH] IN BLOOD BY AUTOMATED COUNT: 13.5 %
EST. AVERAGE GLUCOSE BLD GHB EST-MCNC: 192 MG/DL
GFR SERPL CREATININE-BSD FRML MDRD: 84 ML/MIN/1.73SQ M
GLUCOSE P FAST SERPL-MCNC: 195 MG/DL (ref 70–99)
HBA1C MFR BLD: 8.3 %
HCT VFR BLD AUTO: 39.8 % (ref 41–53)
HDLC SERPL-MCNC: 44 MG/DL
HGB BLD-MCNC: 13.3 G/DL (ref 13.5–17.5)
LDLC SERPL CALC-MCNC: 135 MG/DL
LYMPHOCYTES # BLD AUTO: 1.3 THOUSANDS/ΜL (ref 0.5–4)
LYMPHOCYTES NFR BLD AUTO: 31 % (ref 25–45)
MCH RBC QN AUTO: 29.3 PG (ref 26–34)
MCHC RBC AUTO-ENTMCNC: 33.4 G/DL (ref 31–36)
MCV RBC AUTO: 88 FL (ref 80–100)
MONOCYTES # BLD AUTO: 0.4 THOUSAND/ΜL (ref 0.2–0.9)
MONOCYTES NFR BLD AUTO: 9 % (ref 1–10)
NEUTROPHILS # BLD AUTO: 2.6 THOUSANDS/ΜL (ref 1.8–7.8)
NEUTS SEG NFR BLD AUTO: 59 % (ref 45–65)
NONHDLC SERPL-MCNC: 164 MG/DL
PLATELET # BLD AUTO: 123 THOUSANDS/UL (ref 150–450)
PMV BLD AUTO: 8.7 FL (ref 8.9–12.7)
POTASSIUM SERPL-SCNC: 4.3 MMOL/L (ref 3.6–5)
PROT SERPL-MCNC: 7 G/DL (ref 5.9–8.4)
RBC # BLD AUTO: 4.53 MILLION/UL (ref 4.5–5.9)
SODIUM SERPL-SCNC: 141 MMOL/L (ref 137–147)
TRIGL SERPL-MCNC: 145 MG/DL
TSH SERPL DL<=0.05 MIU/L-ACNC: 1.63 UIU/ML (ref 0.47–4.68)
WBC # BLD AUTO: 4.3 THOUSAND/UL (ref 4.5–11)

## 2022-03-19 PROCEDURE — 36415 COLL VENOUS BLD VENIPUNCTURE: CPT

## 2022-03-19 PROCEDURE — 83036 HEMOGLOBIN GLYCOSYLATED A1C: CPT

## 2022-03-19 PROCEDURE — 84443 ASSAY THYROID STIM HORMONE: CPT

## 2022-03-19 PROCEDURE — 80053 COMPREHEN METABOLIC PANEL: CPT

## 2022-03-19 PROCEDURE — 85025 COMPLETE CBC W/AUTO DIFF WBC: CPT

## 2022-03-19 PROCEDURE — 80061 LIPID PANEL: CPT

## 2022-04-16 ENCOUNTER — HOSPITAL ENCOUNTER (EMERGENCY)
Facility: HOSPITAL | Age: 57
Discharge: HOME | End: 2022-04-16
Attending: EMERGENCY MEDICINE | Admitting: EMERGENCY MEDICINE
Payer: MEDICARE

## 2022-04-16 ENCOUNTER — APPOINTMENT (EMERGENCY)
Dept: RADIOLOGY | Facility: HOSPITAL | Age: 57
End: 2022-04-16
Payer: MEDICARE

## 2022-04-16 VITALS
DIASTOLIC BLOOD PRESSURE: 96 MMHG | TEMPERATURE: 98.3 F | SYSTOLIC BLOOD PRESSURE: 167 MMHG | HEART RATE: 88 BPM | WEIGHT: 211.64 LBS | OXYGEN SATURATION: 98 % | RESPIRATION RATE: 18 BRPM | BODY MASS INDEX: 28.7 KG/M2

## 2022-04-16 DIAGNOSIS — M25.571 ACUTE RIGHT ANKLE PAIN: Primary | ICD-10-CM

## 2022-04-16 DIAGNOSIS — Z87.39 HISTORY OF GOUT: ICD-10-CM

## 2022-04-16 PROCEDURE — 73610 X-RAY EXAM OF ANKLE: CPT

## 2022-04-16 PROCEDURE — 96372 THER/PROPH/DIAG INJ SC/IM: CPT

## 2022-04-16 PROCEDURE — 99283 EMERGENCY DEPT VISIT LOW MDM: CPT

## 2022-04-16 PROCEDURE — 99284 EMERGENCY DEPT VISIT MOD MDM: CPT | Performed by: PHYSICIAN ASSISTANT

## 2022-04-16 RX ORDER — NAPROXEN 500 MG/1
500 TABLET ORAL 2 TIMES DAILY WITH MEALS
Qty: 20 TABLET | Refills: 0 | Status: SHIPPED | OUTPATIENT
Start: 2022-04-16 | End: 2023-04-16

## 2022-04-16 RX ORDER — KETOROLAC TROMETHAMINE 30 MG/ML
15 INJECTION, SOLUTION INTRAMUSCULAR; INTRAVENOUS ONCE
Status: COMPLETED | OUTPATIENT
Start: 2022-04-16 | End: 2022-04-16

## 2022-04-16 RX ADMIN — KETOROLAC TROMETHAMINE 15 MG: 30 INJECTION, SOLUTION INTRAMUSCULAR; INTRAVENOUS at 11:50

## 2022-04-16 NOTE — ED PROVIDER NOTES
History  Chief Complaint   Patient presents with    Foot Pain     pt arrives c/o right foot/ankle pain,swelling and redness " started a few days ago"     52-year-old male presents today for evaluation of right-sided ankle pain and swelling ongoing for the past 3-4 days  Patient denies any traumatic inciting events, numbness, tingling, weakness, paresthesias or paralysis  Patient denies any alleviating factors reports exacerbating factors of movement and palpation  Patient reports a gradual onset of the swelling reports an aching like sensation which radiates throughout the entire ankle joint and has been moderate in severity  Patient reports he has had this in the past and states he has a history of gout  Patient reports he also has a history of liver cirrhosis and states he was on allopurinol in the past   Patient reports he has not taken any medications for this ankle pain at this time  Patient denies fevers, chills, wounds, discoloration patient reports some redness and warmth associated with the swelling  Prior to Admission Medications   Prescriptions Last Dose Informant Patient Reported? Taking?    ALPRAZolam (XANAX) 0 5 mg tablet  Self Yes No   Sig: Take by mouth 3 (three) times a day   BD PEN NEEDLE TREV U/F 32G X 4 MM MISC  Self Yes No   Sig: use once daily WITH TOUJEO PEN   FLUoxetine (PROzac) 20 mg capsule  Self Yes No   Sig: Take 20 mg by mouth daily   RA MELATONIN 3 MG  Self Yes No   Sig: Take 3 mg by mouth every evening   RA Melatonin 3-2 MG TABS  Self Yes No   Sig: Take 1 tablet by mouth every evening   TOUJEO SOLOSTAR 300 units/mL CONCETRATED U-300 injection pen (1-unit dial)  Self Yes No   Sig: inject 32 units subcutaneously once daily   allopurinol (ZYLOPRIM) 100 mg tablet  Self Yes No   Sig: Take 100 mg by mouth daily   benztropine (COGENTIN) 0 5 mg tablet  Self Yes No   Sig: Take one tablet po bid   citalopram (CeleXA) 20 mg tablet  Self Yes No   Sig: Take one  tablet po daily cloZAPine (CLOZARIL) 100 mg tablet  Self Yes No   Sig: Take 100 mg by mouth daily Takes one 200mg tab and one 100mg tab at bedtime = 300mg   clozapine (CLOZARIL) 200 MG tablet  Self Yes No   clozapine (CLOZARIL) 50 MG tablet  Self Yes No   Patient not taking: Reported on 7/16/2021   ergocalciferol (VITAMIN D2) 50,000 units  Self Yes No   Sig: Take 50,000 Units by mouth once a week   lithium 300 MG tablet  Self Yes No   Sig: Take 300 mg by mouth 2 (two) times a day   meloxicam (MOBIC) 7 5 mg tablet  Self Yes No   Sig: meloxicam 7 5 mg tablet   metFORMIN (GLUCOPHAGE) 500 mg tablet  Self Yes No   Sig: Take 500 mg by mouth daily with breakfast   methylphenidate (RITALIN) 10 mg tablet  Self Yes No   Sig: take 1 tablet by mouth twice a day MAXIMUM DAILY DOSE OF 20 milligram   pantoprazole (PROTONIX) 40 mg tablet  Self Yes No   Sig: Take 40 mg by mouth daily   sertraline (ZOLOFT) 50 mg tablet   Yes No   Sig: Take 50 mg by mouth daily   sucralfate (CARAFATE) 1 g tablet  Self Yes No   Sig: sucralfate 1 gram tablet   venlafaxine (EFFEXOR-XR) 75 mg 24 hr capsule  Self Yes No   Sig: Take 75 mg by mouth daily      Facility-Administered Medications: None       Past Medical History:   Diagnosis Date    Anemia     Bipolar disorder (HCC)     Diabetes mellitus ([place] [address] )     Diabetes mellitus, type II ([place] [address] )     Gout     PAT (paroxysmal atrial tachycardia) ([place] [address] )     Schizophrenic disorder ([place] [address] )        History reviewed  No pertinent surgical history  Family History   Problem Relation Age of Onset    Coronary artery disease Mother         premature     I have reviewed and agree with the history as documented  E-Cigarette/Vaping     E-Cigarette/Vaping Substances     Social History     Tobacco Use    Smoking status: Current Every Day Smoker    Smokeless tobacco: Never Used   Substance Use Topics    Alcohol use: No    Drug use: No       Review of Systems   Constitutional: Negative for chills, fatigue and fever     HENT: Negative for congestion, ear pain, rhinorrhea and sore throat  Eyes: Negative for redness  Respiratory: Negative for chest tightness and shortness of breath  Cardiovascular: Negative for chest pain and palpitations  Gastrointestinal: Negative for abdominal pain, nausea and vomiting  Genitourinary: Negative for dysuria and hematuria  Musculoskeletal: Positive for arthralgias  Skin: Negative for rash  Neurological: Negative for dizziness, syncope, light-headedness and numbness  Physical Exam  Physical Exam  Vitals and nursing note reviewed  Constitutional:       Appearance: Normal appearance  He is well-developed  HENT:      Head: Normocephalic and atraumatic  Eyes:      General: No scleral icterus  Pupils: Pupils are equal, round, and reactive to light  Cardiovascular:      Rate and Rhythm: Normal rate and regular rhythm  Pulses: Normal pulses  Pulmonary:      Effort: Pulmonary effort is normal  No respiratory distress  Breath sounds: No stridor  Abdominal:      General: There is no distension  Palpations: There is no mass  Musculoskeletal:      Cervical back: Normal range of motion  Legs:    Skin:     General: Skin is warm and dry  Capillary Refill: Capillary refill takes less than 2 seconds  Coloration: Skin is not jaundiced  Neurological:      Mental Status: He is alert and oriented to person, place, and time        Gait: Gait normal    Psychiatric:         Mood and Affect: Mood normal          Vital Signs  ED Triage Vitals   Temperature Pulse Respirations Blood Pressure SpO2   04/16/22 1146 04/16/22 1146 04/16/22 1146 04/16/22 1146 04/16/22 1146   98 3 °F (36 8 °C) 88 18 167/96 98 %      Temp Source Heart Rate Source Patient Position - Orthostatic VS BP Location FiO2 (%)   04/16/22 1146 04/16/22 1146 04/16/22 1146 04/16/22 1146 --   Tympanic Monitor Sitting Left arm       Pain Score       04/16/22 1150       9           Vitals:    04/16/22 1146   BP: 167/96   Pulse: 88   Patient Position - Orthostatic VS: Sitting         Visual Acuity      ED Medications  Medications   ketorolac (TORADOL) injection 15 mg (15 mg Intramuscular Given 4/16/22 1150)       Diagnostic Studies  Results Reviewed     None                 XR ankle 3+ views RIGHT   ED Interpretation by Raquel Treviño PA-C (04/16 1220)   No acute fractures visualized, potential old well corticated fracture seen on the distal fibula  Procedures  Procedures         ED Course  ED Course as of 04/16/22 1353   Sat Apr 16, 2022   1221 Patient was reexamined at this time and was found to be stable for discharge  Return to emergency department criteria was reviewed with the patient who verbalized understanding and was agreeable to discharge and the treatment plan at this time  SBIRT 20yo+      Most Recent Value   SBIRT (22 yo +)    In order to provide better care to our patients, we are screening all of our patients for alcohol and drug use  Would it be okay to ask you these screening questions? No Filed at: 04/16/2022 1226                    MDM  Number of Diagnoses or Management Options  Diagnosis management comments: 28-year-old male history of gout presents for the evaluation of right ankle pain and swelling consistent with previous gout presentations with the patient  Will obtain an x-ray to evaluate for any intraosseous abnormalities/fractures patient denies direct traumatic inciting events  Evaluation is consistent with a gout flare, no evidence of secondary infection, wound, purulence, fluctuance  Patient reports he has used allopurinol the past however reports a history of liver cirrhosis secondary to medication use, will hold off on this medication in lieu of NSAIDs  Will give Toradol injection in this facility and recommend follow-up with family care      All imaging and/or lab testing discussed with patient, strict return to ED precautions discussed  Patient and/or family members verbalizes understanding and agrees with plan  Patient is stable for discharge     Portions of the record may have been created with voice recognition software  Occasional wrong word or "sound a like" substitutions may have occurred due to the inherent limitations of voice recognition software  Read the chart carefully and recognize, using context, where substitutions have occurred  Disposition  Final diagnoses:   Acute right ankle pain   History of gout     Time reflects when diagnosis was documented in both MDM as applicable and the Disposition within this note     Time User Action Codes Description Comment    4/16/2022 11:48 AM Tatiana Castaneda Add [M25 571] Acute right ankle pain     4/16/2022 11:49 AM Tatiana Castaneda Add [Z87 39] History of gout       ED Disposition     ED Disposition Condition Date/Time Comment    Discharge Good Sat Apr 16, 2022 11:48 AM Roshan Stall discharge to home/self care              Follow-up Information     Follow up With Specialties Details Why Contact Info    Rodríguez Chris MD Family Medicine Schedule an appointment as soon as possible for a visit   [facility] [address] [facility]  [phone]            Discharge Medication List as of 4/16/2022 12:22 PM      START taking these medications    Details   naproxen (EC NAPROSYN) 500 MG EC tablet Take 1 tablet (500 mg total) by mouth 2 (two) times a day with meals, Starting Sat 4/16/2022, Until Sun 4/16/2023, Normal         CONTINUE these medications which have NOT CHANGED    Details   allopurinol (ZYLOPRIM) 100 mg tablet Take 100 mg by mouth daily, Starting Mon 5/18/2020, Historical Med      ALPRAZolam (XANAX) 0 5 mg tablet Take by mouth 3 (three) times a day, Historical Med      BD PEN NEEDLE TREV U/F 32G X 4 MM MISC use once daily WITH TOUJEO PEN, Historical Med      benztropine (COGENTIN) 0 5 mg tablet Take one tablet po bid, Historical Med      citalopram (CeleXA) 20 mg tablet Take one  tablet po daily, Historical Med      !! cloZAPine (CLOZARIL) 100 mg tablet Take 100 mg by mouth daily Takes one 200mg tab and one 100mg tab at bedtime = 300mg, Historical Med      !! clozapine (CLOZARIL) 200 MG tablet Starting Mon 6/15/2020, Historical Med      !! clozapine (CLOZARIL) 50 MG tablet Starting Mon 6/15/2020, Historical Med      ergocalciferol (VITAMIN D2) 50,000 units Take 50,000 Units by mouth once a week, Historical Med      FLUoxetine (PROzac) 20 mg capsule Take 20 mg by mouth daily, Starting Thu 9/24/2020, Historical Med      lithium 300 MG tablet Take 300 mg by mouth 2 (two) times a day, Historical Med      meloxicam (MOBIC) 7 5 mg tablet meloxicam 7 5 mg tablet, Historical Med      metFORMIN (GLUCOPHAGE) 500 mg tablet Take 500 mg by mouth daily with breakfast, Historical Med      methylphenidate (RITALIN) 10 mg tablet take 1 tablet by mouth twice a day MAXIMUM DAILY DOSE OF 20 milligram, Historical Med      pantoprazole (PROTONIX) 40 mg tablet Take 40 mg by mouth daily, Historical Med      RA MELATONIN 3 MG Take 3 mg by mouth every evening, Starting Thu 6/11/2020, Historical Med      RA Melatonin 3-2 MG TABS Take 1 tablet by mouth every evening, Starting Thu 11/5/2020, Historical Med      sertraline (ZOLOFT) 50 mg tablet Take 50 mg by mouth daily, Starting Wed 5/19/2021, Historical Med      sucralfate (CARAFATE) 1 g tablet sucralfate 1 gram tablet, Historical Med      TOUJEO SOLOSTAR 300 units/mL CONCETRATED U-300 injection pen (1-unit dial) inject 32 units subcutaneously once daily, Historical Med      venlafaxine (EFFEXOR-XR) 75 mg 24 hr capsule Take 75 mg by mouth daily, Starting Thu 11/5/2020, Until Fri 11/5/2021, Historical Med       !! - Potential duplicate medications found  Please discuss with provider  No discharge procedures on file      PDMP Review     None          ED Provider  Electronically Signed by           Alo Graf, JONNY  04/16/22 1355

## 2022-06-18 ENCOUNTER — APPOINTMENT (OUTPATIENT)
Dept: LAB | Facility: HOSPITAL | Age: 57
End: 2022-06-18
Attending: FAMILY MEDICINE
Payer: MEDICARE

## 2022-06-18 DIAGNOSIS — E13.69 OTHER SPECIFIED DIABETES MELLITUS WITH OTHER SPECIFIED COMPLICATION, UNSPECIFIED WHETHER LONG TERM INSULIN USE (HCC): ICD-10-CM

## 2022-06-18 LAB
ALBUMIN SERPL BCP-MCNC: 4.2 G/DL (ref 3–5.2)
ALP SERPL-CCNC: 115 U/L (ref 43–122)
ALT SERPL W P-5'-P-CCNC: 24 U/L
ANION GAP SERPL CALCULATED.3IONS-SCNC: 6 MMOL/L (ref 5–14)
AST SERPL W P-5'-P-CCNC: 26 U/L (ref 17–59)
BILIRUB SERPL-MCNC: 0.7 MG/DL
BUN SERPL-MCNC: 16 MG/DL (ref 5–25)
CALCIUM SERPL-MCNC: 9.2 MG/DL (ref 8.4–10.2)
CHLORIDE SERPL-SCNC: 110 MMOL/L (ref 97–108)
CO2 SERPL-SCNC: 22 MMOL/L (ref 22–30)
CREAT SERPL-MCNC: 0.98 MG/DL (ref 0.7–1.5)
EST. AVERAGE GLUCOSE BLD GHB EST-MCNC: 214 MG/DL
GFR SERPL CREATININE-BSD FRML MDRD: 85 ML/MIN/1.73SQ M
GLUCOSE P FAST SERPL-MCNC: 370 MG/DL (ref 70–99)
HBA1C MFR BLD: 9.1 %
POTASSIUM SERPL-SCNC: 4.4 MMOL/L (ref 3.6–5)
PROT SERPL-MCNC: 7.3 G/DL (ref 5.9–8.4)
SODIUM SERPL-SCNC: 138 MMOL/L (ref 137–147)

## 2022-06-18 PROCEDURE — 83036 HEMOGLOBIN GLYCOSYLATED A1C: CPT

## 2022-06-18 PROCEDURE — 36415 COLL VENOUS BLD VENIPUNCTURE: CPT

## 2022-06-18 PROCEDURE — 80053 COMPREHEN METABOLIC PANEL: CPT

## 2022-06-23 ENCOUNTER — OFFICE VISIT (OUTPATIENT)
Dept: OBGYN CLINIC | Facility: MEDICAL CENTER | Age: 57
End: 2022-06-23
Payer: MEDICARE

## 2022-06-23 VITALS
SYSTOLIC BLOOD PRESSURE: 116 MMHG | DIASTOLIC BLOOD PRESSURE: 82 MMHG | HEIGHT: 72 IN | HEART RATE: 85 BPM | WEIGHT: 212 LBS | BODY MASS INDEX: 28.71 KG/M2

## 2022-06-23 DIAGNOSIS — M19.071 OSTEOARTHROSIS, ANKLE AND FOOT, RIGHT: ICD-10-CM

## 2022-06-23 DIAGNOSIS — M25.571 PAIN, JOINT, ANKLE AND FOOT, RIGHT: Primary | ICD-10-CM

## 2022-06-23 DIAGNOSIS — Q66.6 PES PLANOVALGUS: ICD-10-CM

## 2022-06-23 PROCEDURE — 99213 OFFICE O/P EST LOW 20 MIN: CPT | Performed by: EMERGENCY MEDICINE

## 2022-06-23 NOTE — PATIENT INSTRUCTIONS
Follow-up with your foot specialist for your ankle pain and swelling, as the arthritis is likely causing the symptoms    May also benefit from arch supports

## 2022-06-23 NOTE — PROGRESS NOTES
Assessment/Plan:    Diagnoses and all orders for this visit:    Pain, joint, ankle and foot, right  -     Ambulatory Referral to Physical Therapy; Future    Osteoarthrosis, ankle and foot, right  -     Ambulatory Referral to Physical Therapy; Future    Pes planovalgus    Deferred steroid injection for the right ankle pain swelling and arthritis given his diabetes last hemoglobin A1c is 9 1  At this point in time we will recommend physical therapy should follow up with his podiatrist whom he sees for foot care to discuss his pain and arthritis    Chief Complaint:     Chief Complaint   Patient presents with    Right Ankle - Pain       Subjective:   Patient ID: Mckenna Martin is a 65 y o  male  Patient presents for right ankle and foot pain  Patient is a poor historian however he was evaluated in the emergency department April x-rays were obtained showing an old fracture of the lateral malleolus as there was signs of healing  Patient denies any injury to the ankle but does know that he has arthritis  He does see a foot specialist for toenail care  He notes pain is diffuse above the ankle      Review of Systems    The following portions of the patient's chart were reviewed and updated as appropriate: Allergie  Allergies   Allergen Reactions    Bupropion     Chlorproethazine     Molindone     Paliperidone     Thiazide-Type Diuretics    s   Allergen Reactions    Bupropion     Chlorproethazine     Molindone     Paliperidone     Thiazide-Type Diuretics       Diagnosis Date    Anemia     [facility]ipolar [facility] ([facility])     Diabetes mellitus ([facility] )     Diabetes mellitus, type II ([facility] )     Gout     PAT [facility]paroxysmal atrial tachycardia) ([facility])     Schizophrenic disorder ([place] [address] )        History reviewed  No pertinent surgical history      Social History     Socioeconomic History    Marital status: Single     Spouse name: Not on file    Number of children: Not on file    Years of education: Not on file    Highest education level: Not on file   Occupational History    Not on file   Tobacco Use    Smoking status: Current Every Day Smoker    Smokeless tobacco: Never Used   Substance and Sexual Activity    Alcohol use: No    Drug use: No    Sexual activity: Not on file   Other Topics Concern    Not on file   Social History Narrative    Not on file     Social Determinants of Health     Financial Resource Strain: Not on file   Food Insecurity: Not on file   Transportation Needs: Not on file   Physical Activity: Not on file   Stress: Not on file   Social Connections: Not on file   Intimate Partner Violence: Not on file   Housing Stability: Not on file       Family History   Problem Relation Age of Onset    Coronary artery disease Mother         premature       Medications:    Current Outpatient Medications:     allopurinol (ZYLOPRIM) 100 mg tablet, Take 100 mg by mouth daily, Disp: , Rfl:     ALPRAZolam (XANAX) 0 5 mg tablet, Take by mouth 3 (three) times a day, Disp: , Rfl:     BD PEN NEEDLE TREV U/F 32G X 4 MM MISC, use once daily WITH TOUJEO PEN, Disp: , Rfl:     benztropine (COGENTIN) 0 5 mg tablet, Take one tablet po bid, Disp: , Rfl:     citalopram (CeleXA) 20 mg tablet, Take one  tablet po daily, Disp: , Rfl:     cloZAPine (CLOZARIL) 100 mg tablet, Take 100 mg by mouth daily Takes one 200mg tab and one 100mg tab at bedtime = 300mg, Disp: , Rfl:     clozapine (CLOZARIL) 200 MG tablet, , Disp: , Rfl:     ergocalciferol (VITAMIN D2) 50,000 units, Take 50,000 Units by mouth once a week, Disp: , Rfl:     FLUoxetine (PROzac) 20 mg capsule, Take 20 mg by mouth daily, Disp: , Rfl:     lithium 300 MG tablet, Take 300 mg by mouth 2 (two) times a day, Disp: , Rfl:     meloxicam (MOBIC) 7 5 mg tablet, meloxicam 7 5 mg tablet, Disp: , Rfl:     metFORMIN (GLUCOPHAGE) 500 mg tablet, Take 500 mg by mouth daily with breakfast, Disp: , Rfl:     methylphenidate (RITALIN) 10 mg tablet, take 1 tablet by mouth twice a day MAXIMUM DAILY DOSE OF 20 milligram, Disp: , Rfl:     naproxen (EC NAPROSYN) 500 MG EC tablet, Take 1 tablet (500 mg total) by mouth 2 (two) times a day with meals, Disp: 20 tablet, Rfl: 0    pantoprazole (PROTONIX) 40 mg tablet, Take 40 mg by mouth daily, Disp: , Rfl:     RA MELATONIN 3 MG, Take 3 mg by mouth every evening, Disp: , Rfl:     RA Melatonin 3-2 MG TABS, Take 1 tablet by mouth every evening, Disp: , Rfl:     sertraline (ZOLOFT) 50 mg tablet, Take 50 mg by mouth daily, Disp: , Rfl:     sucralfate (CARAFATE) 1 g tablet, sucralfate 1 gram tablet, Disp: , Rfl:     TOUJEO SOLOSTAR 300 units/mL CONCETRATED U-300 injection pen (1-unit dial), inject 32 units subcutaneously once daily, Disp: , Rfl:     clozapine (CLOZARIL) 50 MG tablet, , Disp: , Rfl:     venlafaxine (EFFEXOR-XR) 75 mg 24 hr capsule, Take 75 mg by mouth daily, Disp: , Rfl:     Patient Active Problem List   Diagnosis    Thrombocytopenia (HCC)    Anemia, unspecified    Other vitamin B12 deficiency anemias    Liver cirrhosis (HCC)       Objective:  /82   Pulse 85   Ht 6' (1 829 m)   Wt 96 2 kg (212 lb)   BMI 28 75 kg/m²     Right Ankle Exam     Other   Erythema: absent  Sensation: normal  Pulse: present     Comments:  Lateral malleolus is nontender  There is swelling about the ankle and foot  Pes planus noted  Decreased range of motion  There is no tenderness to palpation of the lisfranc, and no plantar ecchymosis  There is no tenderness to palpation of the proximal and mid fibula                  Physical Exam      Neurologic Exam    Procedures    I have personally reviewed pertinent films in PACS

## 2022-09-03 ENCOUNTER — HOSPITAL ENCOUNTER (OUTPATIENT)
Dept: RADIOLOGY | Facility: HOSPITAL | Age: 57
Discharge: HOME | End: 2022-09-03
Attending: FAMILY MEDICINE
Payer: MEDICARE

## 2022-09-03 ENCOUNTER — APPOINTMENT (OUTPATIENT)
Dept: LAB | Facility: HOSPITAL | Age: 57
End: 2022-09-03
Attending: FAMILY MEDICINE
Payer: MEDICARE

## 2022-09-03 DIAGNOSIS — M25.571 ACUTE RIGHT ANKLE PAIN: ICD-10-CM

## 2022-09-03 DIAGNOSIS — E11.9 DIABETES MELLITUS WITHOUT COMPLICATION (HCC): ICD-10-CM

## 2022-09-03 LAB
ALBUMIN SERPL BCP-MCNC: 4.3 G/DL (ref 3.5–5)
ALP SERPL-CCNC: 112 U/L (ref 43–122)
ALT SERPL W P-5'-P-CCNC: 33 U/L
ANION GAP SERPL CALCULATED.3IONS-SCNC: 8 MMOL/L (ref 5–14)
AST SERPL W P-5'-P-CCNC: 36 U/L (ref 17–59)
BILIRUB SERPL-MCNC: 0.67 MG/DL (ref 0.2–1)
BUN SERPL-MCNC: 19 MG/DL (ref 5–25)
CALCIUM SERPL-MCNC: 9.3 MG/DL (ref 8.4–10.2)
CHLORIDE SERPL-SCNC: 112 MMOL/L (ref 96–108)
CO2 SERPL-SCNC: 21 MMOL/L (ref 21–32)
CREAT SERPL-MCNC: 1.27 MG/DL (ref 0.7–1.5)
EST. AVERAGE GLUCOSE BLD GHB EST-MCNC: 189 MG/DL
GFR SERPL CREATININE-BSD FRML MDRD: 62 ML/MIN/1.73SQ M
GLUCOSE P FAST SERPL-MCNC: 203 MG/DL (ref 70–99)
HBA1C MFR BLD: 8.2 %
POTASSIUM SERPL-SCNC: 4.4 MMOL/L (ref 3.5–5.3)
PROT SERPL-MCNC: 7.8 G/DL (ref 6.4–8.4)
SODIUM SERPL-SCNC: 141 MMOL/L (ref 135–147)
URATE SERPL-MCNC: 8.5 MG/DL (ref 3.5–8.5)

## 2022-09-03 PROCEDURE — 73610 X-RAY EXAM OF ANKLE: CPT

## 2022-09-03 PROCEDURE — 83036 HEMOGLOBIN GLYCOSYLATED A1C: CPT

## 2022-09-03 PROCEDURE — 36415 COLL VENOUS BLD VENIPUNCTURE: CPT

## 2022-09-03 PROCEDURE — 84550 ASSAY OF BLOOD/URIC ACID: CPT

## 2022-09-03 PROCEDURE — 80053 COMPREHEN METABOLIC PANEL: CPT

## 2022-12-03 ENCOUNTER — APPOINTMENT (OUTPATIENT)
Dept: LAB | Facility: HOSPITAL | Age: 57
End: 2022-12-03
Attending: FAMILY MEDICINE

## 2022-12-03 DIAGNOSIS — E13.69 OTHER SPECIFIED DIABETES MELLITUS WITH OTHER SPECIFIED COMPLICATION, UNSPECIFIED WHETHER LONG TERM INSULIN USE (HCC): ICD-10-CM

## 2022-12-03 LAB
ANION GAP SERPL CALCULATED.3IONS-SCNC: 10 MMOL/L (ref 5–14)
BUN SERPL-MCNC: 21 MG/DL (ref 5–25)
CALCIUM SERPL-MCNC: 9.4 MG/DL (ref 8.4–10.2)
CHLORIDE SERPL-SCNC: 109 MMOL/L (ref 96–108)
CO2 SERPL-SCNC: 24 MMOL/L (ref 21–32)
CREAT SERPL-MCNC: 1.37 MG/DL (ref 0.7–1.5)
EST. AVERAGE GLUCOSE BLD GHB EST-MCNC: 197 MG/DL
GFR SERPL CREATININE-BSD FRML MDRD: 57 ML/MIN/1.73SQ M
GLUCOSE P FAST SERPL-MCNC: 216 MG/DL (ref 70–99)
HBA1C MFR BLD: 8.5 %
POTASSIUM SERPL-SCNC: 4.5 MMOL/L (ref 3.5–5.3)
SODIUM SERPL-SCNC: 143 MMOL/L (ref 135–147)

## 2023-03-25 ENCOUNTER — APPOINTMENT (OUTPATIENT)
Dept: LAB | Facility: HOSPITAL | Age: 58
End: 2023-03-25

## 2023-03-25 DIAGNOSIS — N18.6 TYPE 2 DIABETES MELLITUS WITH ESRD (END-STAGE RENAL DISEASE) (HCC): ICD-10-CM

## 2023-03-25 DIAGNOSIS — I10 ESSENTIAL HYPERTENSION, MALIGNANT: ICD-10-CM

## 2023-03-25 DIAGNOSIS — E11.22 TYPE 2 DIABETES MELLITUS WITH ESRD (END-STAGE RENAL DISEASE) (HCC): ICD-10-CM

## 2023-03-25 DIAGNOSIS — E78.5 HYPERLIPIDEMIA, UNSPECIFIED HYPERLIPIDEMIA TYPE: ICD-10-CM

## 2023-03-25 LAB
ALBUMIN SERPL BCP-MCNC: 4.1 G/DL (ref 3.5–5)
ALP SERPL-CCNC: 99 U/L (ref 34–104)
ALT SERPL W P-5'-P-CCNC: 18 U/L (ref 7–52)
ANION GAP SERPL CALCULATED.3IONS-SCNC: 9 MMOL/L (ref 4–13)
AST SERPL W P-5'-P-CCNC: 19 U/L (ref 13–39)
BASOPHILS # BLD AUTO: 0.05 THOUSANDS/ÂΜL (ref 0–0.1)
BASOPHILS NFR BLD AUTO: 1 % (ref 0–1)
BILIRUB SERPL-MCNC: 0.43 MG/DL (ref 0.2–1)
BUN SERPL-MCNC: 24 MG/DL (ref 5–25)
CALCIUM SERPL-MCNC: 9.5 MG/DL (ref 8.4–10.2)
CHLORIDE SERPL-SCNC: 109 MMOL/L (ref 96–108)
CHOLEST SERPL-MCNC: 128 MG/DL
CO2 SERPL-SCNC: 27 MMOL/L (ref 21–32)
CREAT SERPL-MCNC: 1.34 MG/DL (ref 0.6–1.3)
EOSINOPHIL # BLD AUTO: 0 THOUSAND/ÂΜL (ref 0–0.61)
EOSINOPHIL NFR BLD AUTO: 0 % (ref 0–6)
ERYTHROCYTE [DISTWIDTH] IN BLOOD BY AUTOMATED COUNT: 12.1 % (ref 11.6–15.1)
EST. AVERAGE GLUCOSE BLD GHB EST-MCNC: 186 MG/DL
GFR SERPL CREATININE-BSD FRML MDRD: 58 ML/MIN/1.73SQ M
GLUCOSE P FAST SERPL-MCNC: 204 MG/DL (ref 65–99)
HBA1C MFR BLD: 8.1 %
HCT VFR BLD AUTO: 43.6 % (ref 36.5–49.3)
HDLC SERPL-MCNC: 33 MG/DL
HGB BLD-MCNC: 13.9 G/DL (ref 12–17)
IMM GRANULOCYTES # BLD AUTO: 0.04 THOUSAND/UL (ref 0–0.2)
IMM GRANULOCYTES NFR BLD AUTO: 1 % (ref 0–2)
LDLC SERPL CALC-MCNC: 73 MG/DL (ref 0–100)
LYMPHOCYTES # BLD AUTO: 1.42 THOUSANDS/ÂΜL (ref 0.6–4.47)
LYMPHOCYTES NFR BLD AUTO: 28 % (ref 14–44)
MCH RBC QN AUTO: 28 PG (ref 26.8–34.3)
MCHC RBC AUTO-ENTMCNC: 31.9 G/DL (ref 31.4–37.4)
MCV RBC AUTO: 88 FL (ref 82–98)
MONOCYTES # BLD AUTO: 0.48 THOUSAND/ÂΜL (ref 0.17–1.22)
MONOCYTES NFR BLD AUTO: 9 % (ref 4–12)
NEUTROPHILS # BLD AUTO: 3.15 THOUSANDS/ÂΜL (ref 1.85–7.62)
NEUTS SEG NFR BLD AUTO: 61 % (ref 43–75)
NONHDLC SERPL-MCNC: 95 MG/DL
NRBC BLD AUTO-RTO: 0 /100 WBCS
PLATELET # BLD AUTO: 175 THOUSANDS/UL (ref 149–390)
PMV BLD AUTO: 10.9 FL (ref 8.9–12.7)
POTASSIUM SERPL-SCNC: 4.3 MMOL/L (ref 3.5–5.3)
PROT SERPL-MCNC: 7 G/DL (ref 6.4–8.4)
RBC # BLD AUTO: 4.96 MILLION/UL (ref 3.88–5.62)
SODIUM SERPL-SCNC: 145 MMOL/L (ref 135–147)
TRIGL SERPL-MCNC: 111 MG/DL
WBC # BLD AUTO: 5.14 THOUSAND/UL (ref 4.31–10.16)

## 2023-07-08 ENCOUNTER — APPOINTMENT (OUTPATIENT)
Dept: LAB | Facility: HOSPITAL | Age: 58
End: 2023-07-08
Payer: MEDICARE

## 2023-07-08 DIAGNOSIS — N18.6 TYPE 2 DIABETES MELLITUS WITH ESRD (END-STAGE RENAL DISEASE) (HCC): ICD-10-CM

## 2023-07-08 DIAGNOSIS — E11.22 TYPE 2 DIABETES MELLITUS WITH ESRD (END-STAGE RENAL DISEASE) (HCC): ICD-10-CM

## 2023-07-08 LAB
ALBUMIN SERPL BCP-MCNC: 4.2 G/DL (ref 3.5–5)
ALP SERPL-CCNC: 96 U/L (ref 34–104)
ALT SERPL W P-5'-P-CCNC: 25 U/L (ref 7–52)
ANION GAP SERPL CALCULATED.3IONS-SCNC: 9 MMOL/L
AST SERPL W P-5'-P-CCNC: 22 U/L (ref 13–39)
BILIRUB SERPL-MCNC: 0.56 MG/DL (ref 0.2–1)
BUN SERPL-MCNC: 22 MG/DL (ref 5–25)
CALCIUM SERPL-MCNC: 9.5 MG/DL (ref 8.4–10.2)
CHLORIDE SERPL-SCNC: 109 MMOL/L (ref 96–108)
CO2 SERPL-SCNC: 25 MMOL/L (ref 21–32)
CREAT SERPL-MCNC: 1.28 MG/DL (ref 0.6–1.3)
GFR SERPL CREATININE-BSD FRML MDRD: 61 ML/MIN/1.73SQ M
GLUCOSE P FAST SERPL-MCNC: 112 MG/DL (ref 65–99)
POTASSIUM SERPL-SCNC: 3.9 MMOL/L (ref 3.5–5.3)
PROT SERPL-MCNC: 7 G/DL (ref 6.4–8.4)
SODIUM SERPL-SCNC: 143 MMOL/L (ref 135–147)

## 2023-07-08 PROCEDURE — 83036 HEMOGLOBIN GLYCOSYLATED A1C: CPT

## 2023-07-08 PROCEDURE — 36415 COLL VENOUS BLD VENIPUNCTURE: CPT

## 2023-07-08 PROCEDURE — 80053 COMPREHEN METABOLIC PANEL: CPT

## 2023-07-11 LAB
EST. AVERAGE GLUCOSE BLD GHB EST-MCNC: 189 MG/DL
HBA1C MFR BLD: 8.2 %

## 2023-10-28 ENCOUNTER — APPOINTMENT (OUTPATIENT)
Dept: LAB | Facility: HOSPITAL | Age: 58
End: 2023-10-28
Payer: MEDICARE

## 2023-10-28 DIAGNOSIS — E11.22 TYPE 2 DIABETES MELLITUS WITH ESRD (END-STAGE RENAL DISEASE) (HCC): ICD-10-CM

## 2023-10-28 DIAGNOSIS — E78.5 HYPERLIPIDEMIA, UNSPECIFIED HYPERLIPIDEMIA TYPE: ICD-10-CM

## 2023-10-28 DIAGNOSIS — N18.6 TYPE 2 DIABETES MELLITUS WITH ESRD (END-STAGE RENAL DISEASE) (HCC): ICD-10-CM

## 2023-10-28 DIAGNOSIS — I10 ESSENTIAL HYPERTENSION, MALIGNANT: ICD-10-CM

## 2023-10-28 LAB
ALBUMIN SERPL BCP-MCNC: 4.1 G/DL (ref 3.5–5)
ALP SERPL-CCNC: 96 U/L (ref 34–104)
ALT SERPL W P-5'-P-CCNC: 19 U/L (ref 7–52)
ANION GAP SERPL CALCULATED.3IONS-SCNC: 8 MMOL/L
AST SERPL W P-5'-P-CCNC: 20 U/L (ref 13–39)
BASOPHILS # BLD AUTO: 0.05 THOUSANDS/ÂΜL (ref 0–0.1)
BASOPHILS NFR BLD AUTO: 1 % (ref 0–1)
BILIRUB SERPL-MCNC: 0.55 MG/DL (ref 0.2–1)
BUN SERPL-MCNC: 19 MG/DL (ref 5–25)
CALCIUM SERPL-MCNC: 9.1 MG/DL (ref 8.4–10.2)
CHLORIDE SERPL-SCNC: 110 MMOL/L (ref 96–108)
CHOLEST SERPL-MCNC: 164 MG/DL
CO2 SERPL-SCNC: 25 MMOL/L (ref 21–32)
CREAT SERPL-MCNC: 1.36 MG/DL (ref 0.6–1.3)
EOSINOPHIL # BLD AUTO: 0 THOUSAND/ÂΜL (ref 0–0.61)
EOSINOPHIL NFR BLD AUTO: 0 % (ref 0–6)
ERYTHROCYTE [DISTWIDTH] IN BLOOD BY AUTOMATED COUNT: 12.8 % (ref 11.6–15.1)
EST. AVERAGE GLUCOSE BLD GHB EST-MCNC: 189 MG/DL
GFR SERPL CREATININE-BSD FRML MDRD: 57 ML/MIN/1.73SQ M
GLUCOSE P FAST SERPL-MCNC: 104 MG/DL (ref 65–99)
HBA1C MFR BLD: 8.2 %
HCT VFR BLD AUTO: 45.4 % (ref 36.5–49.3)
HDLC SERPL-MCNC: 38 MG/DL
HGB BLD-MCNC: 14.5 G/DL (ref 12–17)
IMM GRANULOCYTES # BLD AUTO: 0.02 THOUSAND/UL (ref 0–0.2)
IMM GRANULOCYTES NFR BLD AUTO: 0 % (ref 0–2)
LDLC SERPL CALC-MCNC: 101 MG/DL (ref 0–100)
LYMPHOCYTES # BLD AUTO: 1.42 THOUSANDS/ÂΜL (ref 0.6–4.47)
LYMPHOCYTES NFR BLD AUTO: 30 % (ref 14–44)
MCH RBC QN AUTO: 28.4 PG (ref 26.8–34.3)
MCHC RBC AUTO-ENTMCNC: 31.9 G/DL (ref 31.4–37.4)
MCV RBC AUTO: 89 FL (ref 82–98)
MONOCYTES # BLD AUTO: 0.47 THOUSAND/ÂΜL (ref 0.17–1.22)
MONOCYTES NFR BLD AUTO: 10 % (ref 4–12)
NEUTROPHILS # BLD AUTO: 2.83 THOUSANDS/ÂΜL (ref 1.85–7.62)
NEUTS SEG NFR BLD AUTO: 59 % (ref 43–75)
NONHDLC SERPL-MCNC: 126 MG/DL
NRBC BLD AUTO-RTO: 0 /100 WBCS
PLATELET # BLD AUTO: 110 THOUSANDS/UL (ref 149–390)
PMV BLD AUTO: 10.9 FL (ref 8.9–12.7)
POTASSIUM SERPL-SCNC: 3.9 MMOL/L (ref 3.5–5.3)
PROT SERPL-MCNC: 6.7 G/DL (ref 6.4–8.4)
RBC # BLD AUTO: 5.1 MILLION/UL (ref 3.88–5.62)
SODIUM SERPL-SCNC: 143 MMOL/L (ref 135–147)
TRIGL SERPL-MCNC: 123 MG/DL
WBC # BLD AUTO: 4.79 THOUSAND/UL (ref 4.31–10.16)

## 2023-10-28 PROCEDURE — 80061 LIPID PANEL: CPT

## 2023-10-28 PROCEDURE — 83036 HEMOGLOBIN GLYCOSYLATED A1C: CPT

## 2023-10-28 PROCEDURE — 80053 COMPREHEN METABOLIC PANEL: CPT

## 2023-10-28 PROCEDURE — 85025 COMPLETE CBC W/AUTO DIFF WBC: CPT

## 2023-10-28 PROCEDURE — 36415 COLL VENOUS BLD VENIPUNCTURE: CPT

## 2024-02-03 ENCOUNTER — APPOINTMENT (OUTPATIENT)
Dept: LAB | Facility: HOSPITAL | Age: 59
End: 2024-02-03
Payer: MEDICARE

## 2024-02-03 DIAGNOSIS — E11.22 TYPE 2 DIABETES MELLITUS WITH ESRD (END-STAGE RENAL DISEASE) (HCC): ICD-10-CM

## 2024-02-03 DIAGNOSIS — N18.31 CHRONIC KIDNEY DISEASE (CKD) STAGE G3A/A1, MODERATELY DECREASED GLOMERULAR FILTRATION RATE (GFR) BETWEEN 45-59 ML/MIN/1.73 SQUARE METER AND ALBUMINURIA CREATININE RATIO LESS THAN 30 MG/G (HCC): ICD-10-CM

## 2024-02-03 DIAGNOSIS — N18.6 TYPE 2 DIABETES MELLITUS WITH ESRD (END-STAGE RENAL DISEASE) (HCC): ICD-10-CM

## 2024-02-03 LAB
ALBUMIN SERPL BCP-MCNC: 4 G/DL (ref 3.5–5)
ALP SERPL-CCNC: 93 U/L (ref 34–104)
ALT SERPL W P-5'-P-CCNC: 20 U/L (ref 7–52)
ANION GAP SERPL CALCULATED.3IONS-SCNC: 10 MMOL/L
AST SERPL W P-5'-P-CCNC: 20 U/L (ref 13–39)
BASOPHILS # BLD AUTO: 0.05 THOUSANDS/ÂΜL (ref 0–0.1)
BASOPHILS NFR BLD AUTO: 1 % (ref 0–1)
BILIRUB SERPL-MCNC: 0.68 MG/DL (ref 0.2–1)
BUN SERPL-MCNC: 19 MG/DL (ref 5–25)
CALCIUM SERPL-MCNC: 9.1 MG/DL (ref 8.4–10.2)
CHLORIDE SERPL-SCNC: 109 MMOL/L (ref 96–108)
CO2 SERPL-SCNC: 23 MMOL/L (ref 21–32)
CREAT SERPL-MCNC: 1.31 MG/DL (ref 0.6–1.3)
EOSINOPHIL # BLD AUTO: 0 THOUSAND/ÂΜL (ref 0–0.61)
EOSINOPHIL NFR BLD AUTO: 0 % (ref 0–6)
ERYTHROCYTE [DISTWIDTH] IN BLOOD BY AUTOMATED COUNT: 12.9 % (ref 11.6–15.1)
EST. AVERAGE GLUCOSE BLD GHB EST-MCNC: 186 MG/DL
GFR SERPL CREATININE-BSD FRML MDRD: 60 ML/MIN/1.73SQ M
GLUCOSE P FAST SERPL-MCNC: 92 MG/DL (ref 65–99)
HBA1C MFR BLD: 8.1 %
HCT VFR BLD AUTO: 44.6 % (ref 36.5–49.3)
HGB BLD-MCNC: 14.5 G/DL (ref 12–17)
IMM GRANULOCYTES # BLD AUTO: 0.02 THOUSAND/UL (ref 0–0.2)
IMM GRANULOCYTES NFR BLD AUTO: 0 % (ref 0–2)
LYMPHOCYTES # BLD AUTO: 1.35 THOUSANDS/ÂΜL (ref 0.6–4.47)
LYMPHOCYTES NFR BLD AUTO: 25 % (ref 14–44)
MCH RBC QN AUTO: 28.3 PG (ref 26.8–34.3)
MCHC RBC AUTO-ENTMCNC: 32.5 G/DL (ref 31.4–37.4)
MCV RBC AUTO: 87 FL (ref 82–98)
MONOCYTES # BLD AUTO: 0.52 THOUSAND/ÂΜL (ref 0.17–1.22)
MONOCYTES NFR BLD AUTO: 10 % (ref 4–12)
NEUTROPHILS # BLD AUTO: 3.49 THOUSANDS/ÂΜL (ref 1.85–7.62)
NEUTS SEG NFR BLD AUTO: 64 % (ref 43–75)
NRBC BLD AUTO-RTO: 0 /100 WBCS
PLATELET # BLD AUTO: 126 THOUSANDS/UL (ref 149–390)
PMV BLD AUTO: 10.4 FL (ref 8.9–12.7)
POTASSIUM SERPL-SCNC: 3.8 MMOL/L (ref 3.5–5.3)
PROT SERPL-MCNC: 6.8 G/DL (ref 6.4–8.4)
RBC # BLD AUTO: 5.12 MILLION/UL (ref 3.88–5.62)
SODIUM SERPL-SCNC: 142 MMOL/L (ref 135–147)
WBC # BLD AUTO: 5.43 THOUSAND/UL (ref 4.31–10.16)

## 2024-02-03 PROCEDURE — 80053 COMPREHEN METABOLIC PANEL: CPT

## 2024-02-03 PROCEDURE — 85025 COMPLETE CBC W/AUTO DIFF WBC: CPT

## 2024-02-03 PROCEDURE — 83036 HEMOGLOBIN GLYCOSYLATED A1C: CPT

## 2024-02-03 PROCEDURE — 36415 COLL VENOUS BLD VENIPUNCTURE: CPT

## 2024-02-16 ENCOUNTER — OFFICE VISIT (OUTPATIENT)
Dept: URGENT CARE | Age: 59
End: 2024-02-16
Payer: MEDICARE

## 2024-02-16 VITALS
DIASTOLIC BLOOD PRESSURE: 72 MMHG | SYSTOLIC BLOOD PRESSURE: 126 MMHG | RESPIRATION RATE: 18 BRPM | TEMPERATURE: 99.1 F | OXYGEN SATURATION: 99 % | HEART RATE: 106 BPM

## 2024-02-16 DIAGNOSIS — J06.9 VIRAL URI WITH COUGH: Primary | ICD-10-CM

## 2024-02-16 LAB
SARS-COV-2 AG UPPER RESP QL IA: NEGATIVE
VALID CONTROL: NORMAL

## 2024-02-16 PROCEDURE — 87811 SARS-COV-2 COVID19 W/OPTIC: CPT | Performed by: STUDENT IN AN ORGANIZED HEALTH CARE EDUCATION/TRAINING PROGRAM

## 2024-02-16 PROCEDURE — 99213 OFFICE O/P EST LOW 20 MIN: CPT | Performed by: EMERGENCY MEDICINE

## 2024-02-16 PROCEDURE — 87636 SARSCOV2 & INF A&B AMP PRB: CPT | Performed by: STUDENT IN AN ORGANIZED HEALTH CARE EDUCATION/TRAINING PROGRAM

## 2024-02-16 RX ORDER — BENZONATATE 200 MG/1
200 CAPSULE ORAL 3 TIMES DAILY PRN
Qty: 20 CAPSULE | Refills: 0 | Status: SHIPPED | OUTPATIENT
Start: 2024-02-16

## 2024-02-16 RX ORDER — GUAIFENESIN 600 MG/1
1200 TABLET, EXTENDED RELEASE ORAL EVERY 12 HOURS SCHEDULED
Qty: 40 TABLET | Refills: 0 | Status: SHIPPED | OUTPATIENT
Start: 2024-02-16 | End: 2024-02-26

## 2024-02-16 NOTE — PROGRESS NOTES
[facility]        NAME: Sg Kaplan is a 59 y.o. male  : 1965    MRN: [id number]  DATE: 2024  TIME: 10:32 AM    Assessment and Plan   Viral URI with cough [J06.9]  1. Viral URI with cough  benzonatate (TESSALON) 200 MG capsule    guaiFENesin (MUCINEX) 600 mg 12 hr tablet    Covid/Flu- Office Collect Normal      Symptoms are consistent with viral URI.  Rapid COVID-negative, send out flu.  Supportive care as below.  Reviewed return and ER precautions.      Patient Instructions   Your symptoms are consistent with a viral upper respiratory infection.  You had a negative rapid COVID test today.  We are sending out a flu test.  You can follow your results on your MyChart, please give us a call if you have any questions.    I am sending in 2 medications to help you with your symptoms.  I am sending Mucinex to help keep your mucus thin so it is easier for you to get out.  I am also sending in a cough medicine.  The Mucinex is also available over-the-counter, you may purchase it over-the-counter if your insurance does not cover the prescription.  I recommend switching from diet soda to water to help with your hydration while you are sick.    We discussed that over the coming 3 days, you should start to feel that your symptoms are no longer worsening.  After this, you should start to feel that you are getting a little bit better each day.  If you feel that instead your start to get worse by day 10, please return to see us or see your PCP for follow-up recheck.    If you develop any severe symptoms such as shortness of breath or chest pain, please go to the ER.    Follow up with PCP in 3-5 days.  Proceed to  ER if symptoms worsen.    Chief Complaint     Chief Complaint   Patient presents with    Cold Like Symptoms     Cough and congestion for past 4 days. Chills, fatigue.          History of Present Illness       Patient presents for 4 days of symptoms with cough, nasal and chest congestion,  fatigue, sweats.  Has not checked his temperature at home.  Has not taken anything over-the-counter so far.  States he is drinking a lot of diet sodas for hydration.  No shortness of breath.  No known sick contacts.        Review of Systems   Review of Systems   All other systems reviewed and are negative.        Current Medications       Current Outpatient Medications:     allopurinol (ZYLOPRIM) 100 mg tablet, Take 100 mg by mouth daily, Disp: , Rfl:     ALPRAZolam (XANAX) 0.5 mg tablet, Take by mouth 3 (three) times a day, Disp: , Rfl:     BD PEN NEEDLE TREV U/F 32G X 4 MM MISC, use once daily WITH TOUJEO PEN, Disp: , Rfl:     benzonatate (TESSALON) 200 MG capsule, Take 1 capsule (200 mg total) by mouth 3 (three) times a day as needed for cough, Disp: 20 capsule, Rfl: 0    benztropine (COGENTIN) 0.5 mg tablet, Take one tablet po bid, Disp: , Rfl:     citalopram (CeleXA) 20 mg tablet, Take one  tablet po daily, Disp: , Rfl:     cloZAPine (CLOZARIL) 100 mg tablet, Take 100 mg by mouth daily Takes one 200mg tab and one 100mg tab at bedtime = 300mg, Disp: , Rfl:     clozapine (CLOZARIL) 200 MG tablet, , Disp: , Rfl:     ergocalciferol (VITAMIN D2) 50,000 units, Take 50,000 Units by mouth once a week, Disp: , Rfl:     FLUoxetine (PROzac) 20 mg capsule, Take 20 mg by mouth daily, Disp: , Rfl:     guaiFENesin (MUCINEX) 600 mg 12 hr tablet, Take 2 tablets (1,200 mg total) by mouth every 12 (twelve) hours for 10 days, Disp: 40 tablet, Rfl: 0    lithium 300 MG tablet, Take 300 mg by mouth 2 (two) times a day, Disp: , Rfl:     meloxicam (MOBIC) 7.5 mg tablet, meloxicam 7.5 mg tablet, Disp: , Rfl:     metFORMIN (GLUCOPHAGE) 500 mg tablet, Take 500 mg by mouth daily with breakfast, Disp: , Rfl:     methylphenidate (RITALIN) 10 mg tablet, take 1 tablet by mouth twice a day MAXIMUM DAILY DOSE OF 20 milligram, Disp: , Rfl:     pantoprazole (PROTONIX) 40 mg tablet, Take 40 mg by mouth daily, Disp: , Rfl:     RA MELATONIN 3 MG,  Take 3 mg by mouth every evening, Disp: , Rfl:     RA Melatonin 3-2 MG TABS, Take 1 tablet by mouth every evening, Disp: , Rfl:     sertraline (ZOLOFT) 50 mg tablet, Take 50 mg by mouth daily, Disp: , Rfl:     sucralfate (CARAFATE) 1 g tablet, sucralfate 1 gram tablet, Disp: , Rfl:     TOUJEO SOLOSTAR 300 units/mL CONCETRATED U-300 injection pen (1-unit dial), inject 32 units subcutaneously once daily, Disp: , Rfl:     clozapine (CLOZARIL) 50 MG tablet, , Disp: , Rfl:     naproxen (EC NAPROSYN) 500 MG EC tablet, Take 1 tablet (500 mg total) by mouth 2 (two) times a day with meals, Disp: 20 tablet, Rfl: 0    venlafaxine (EFFEXOR-XR) 75 mg 24 hr capsule, Take 75 mg by mouth daily, Disp: , Rfl:     Current Allergies     Allergies as of 02/16/2024 - Reviewed 02/16/2024   Allergen Reaction Noted    Bupropion  05/14/2018    Chlorproethazine  05/14/2018    Molindone  05/14/2018    Paliperidone  05/14/2018    Thiazide-type diuretics  05/14/2018            The following portions of the patient's history were reviewed and updated as appropriate: allergies, current medications, past family history, past medical history, past social history, past surgical history and problem list.     Past Medical History:   Diagnosis Date    Anemia     Bipolar disorder (HCC)     Diabetes mellitus (HCC)     Diabetes mellitus, type II (HCC)     Gout     PAT (paroxysmal atrial tachycardia) (HCC)     Schizophrenic disorder (HCC)        No past surgical history on file.    Family History   Problem Relation Age of Onset    Coronary artery disease Mother         premature         Medications have been verified.        Objective   /72   Pulse (!) 106   Temp 99.1 °F (37.3 °C)   Resp 18   SpO2 99%   No LMP for male patient.       Physical Exam     Physical Exam  Vitals and nursing note reviewed.   Constitutional:       General: He is not in acute distress.     Appearance: Normal appearance. He is not ill-appearing or toxic-appearing.   HENT:       Head: Normocephalic and atraumatic.      Right Ear: External ear normal. There is impacted cerumen.      Left Ear: External ear normal. There is impacted cerumen.      Nose: Congestion and rhinorrhea present.      Mouth/Throat:      Mouth: Mucous membranes are moist.      Pharynx: No oropharyngeal exudate or posterior oropharyngeal erythema.   Eyes:      Extraocular Movements: Extraocular movements intact.   Cardiovascular:      Rate and Rhythm: Normal rate and regular rhythm.      Heart sounds: Normal heart sounds.   Pulmonary:      Effort: Pulmonary effort is normal. No respiratory distress.      Breath sounds: Normal breath sounds. No stridor. No wheezing, rhonchi or rales.   Skin:     General: Skin is warm and dry.      Capillary Refill: Capillary refill takes less than 2 seconds.      Findings: No rash.   Neurological:      Mental Status: He is alert.      Gait: Gait normal.   Psychiatric:         Behavior: Behavior normal.

## 2024-02-16 NOTE — PATIENT INSTRUCTIONS
Your symptoms are consistent with a viral upper respiratory infection.  You had a negative rapid COVID test today.  We are sending out a flu test.  You can follow your results on your MyChart, please give us a call if you have any questions.    I am sending in 2 medications to help you with your symptoms.  I am sending Mucinex to help keep your mucus thin so it is easier for you to get out.  I am also sending in a cough medicine.  The Mucinex is also available over-the-counter, you may purchase it over-the-counter if your insurance does not cover the prescription.  I recommend switching from diet soda to water to help with your hydration while you are sick.    We discussed that over the coming 3 days, you should start to feel that your symptoms are no longer worsening.  After this, you should start to feel that you are getting a little bit better each day.  If you feel that instead your start to get worse by day 10, please return to see us or see your PCP for follow-up recheck.    If you develop any severe symptoms such as shortness of breath or chest pain, please go to the ER.

## 2024-02-17 LAB
FLUAV RNA RESP QL NAA+PROBE: POSITIVE
FLUBV RNA RESP QL NAA+PROBE: NEGATIVE
SARS-COV-2 RNA RESP QL NAA+PROBE: NEGATIVE

## 2024-02-19 ENCOUNTER — TELEPHONE (OUTPATIENT)
Dept: URGENT CARE | Age: 59
End: 2024-02-19

## 2024-02-19 NOTE — TELEPHONE ENCOUNTER
Attempted to call pt for positive flu result. No answer/no VM available.  He has been sent [employer] I/O message.

## 2024-04-20 ENCOUNTER — APPOINTMENT (OUTPATIENT)
Dept: LAB | Facility: HOSPITAL | Age: 59
End: 2024-04-20
Payer: MEDICARE

## 2024-04-20 DIAGNOSIS — N18.6 TYPE 2 DIABETES MELLITUS WITH ESRD (END-STAGE RENAL DISEASE) (HCC): ICD-10-CM

## 2024-04-20 DIAGNOSIS — E11.22 TYPE 2 DIABETES MELLITUS WITH ESRD (END-STAGE RENAL DISEASE) (HCC): ICD-10-CM

## 2024-04-20 DIAGNOSIS — I10 ESSENTIAL HYPERTENSION, MALIGNANT: ICD-10-CM

## 2024-04-20 LAB
ALBUMIN SERPL BCP-MCNC: 3.9 G/DL (ref 3.5–5)
ALP SERPL-CCNC: 93 U/L (ref 34–104)
ALT SERPL W P-5'-P-CCNC: 18 U/L (ref 7–52)
ANION GAP SERPL CALCULATED.3IONS-SCNC: 9 MMOL/L (ref 4–13)
AST SERPL W P-5'-P-CCNC: 20 U/L (ref 13–39)
BILIRUB SERPL-MCNC: 0.57 MG/DL (ref 0.2–1)
BUN SERPL-MCNC: 18 MG/DL (ref 5–25)
CALCIUM SERPL-MCNC: 9.3 MG/DL (ref 8.4–10.2)
CHLORIDE SERPL-SCNC: 109 MMOL/L (ref 96–108)
CO2 SERPL-SCNC: 25 MMOL/L (ref 21–32)
CREAT SERPL-MCNC: 1.26 MG/DL (ref 0.6–1.3)
EST. AVERAGE GLUCOSE BLD GHB EST-MCNC: 183 MG/DL
GFR SERPL CREATININE-BSD FRML MDRD: 62 ML/MIN/1.73SQ M
GLUCOSE P FAST SERPL-MCNC: 99 MG/DL (ref 65–99)
HBA1C MFR BLD: 8 %
POTASSIUM SERPL-SCNC: 4.1 MMOL/L (ref 3.5–5.3)
PROT SERPL-MCNC: 6.8 G/DL (ref 6.4–8.4)
SODIUM SERPL-SCNC: 143 MMOL/L (ref 135–147)

## 2024-04-20 PROCEDURE — 36415 COLL VENOUS BLD VENIPUNCTURE: CPT

## 2024-04-20 PROCEDURE — 80053 COMPREHEN METABOLIC PANEL: CPT

## 2024-04-20 PROCEDURE — 83036 HEMOGLOBIN GLYCOSYLATED A1C: CPT

## 2024-07-27 ENCOUNTER — APPOINTMENT (OUTPATIENT)
Dept: LAB | Facility: HOSPITAL | Age: 59
End: 2024-07-27
Payer: MEDICARE

## 2024-07-27 DIAGNOSIS — Z00.01 ENCOUNTER FOR GENERAL ADULT MEDICAL EXAMINATION WITH ABNORMAL FINDINGS: ICD-10-CM

## 2024-07-27 DIAGNOSIS — I10 ESSENTIAL HYPERTENSION, MALIGNANT: ICD-10-CM

## 2024-07-27 DIAGNOSIS — E13.69 OTHER SPECIFIED DIABETES MELLITUS WITH OTHER SPECIFIED COMPLICATION, UNSPECIFIED WHETHER LONG TERM INSULIN USE (HCC): ICD-10-CM

## 2024-07-27 LAB
ALBUMIN SERPL BCG-MCNC: 4 G/DL (ref 3.5–5)
ALP SERPL-CCNC: 114 U/L (ref 34–104)
ALT SERPL W P-5'-P-CCNC: 18 U/L (ref 7–52)
ANION GAP SERPL CALCULATED.3IONS-SCNC: 9 MMOL/L (ref 4–13)
AST SERPL W P-5'-P-CCNC: 18 U/L (ref 13–39)
BASOPHILS # BLD AUTO: 0.06 THOUSANDS/ÂΜL (ref 0–0.1)
BASOPHILS NFR BLD AUTO: 1 % (ref 0–1)
BILIRUB SERPL-MCNC: 0.52 MG/DL (ref 0.2–1)
BUN SERPL-MCNC: 18 MG/DL (ref 5–25)
CALCIUM SERPL-MCNC: 9.2 MG/DL (ref 8.4–10.2)
CHLORIDE SERPL-SCNC: 109 MMOL/L (ref 96–108)
CHOLEST SERPL-MCNC: 197 MG/DL
CO2 SERPL-SCNC: 24 MMOL/L (ref 21–32)
CREAT SERPL-MCNC: 1.3 MG/DL (ref 0.6–1.3)
EOSINOPHIL # BLD AUTO: 0.14 THOUSAND/ÂΜL (ref 0–0.61)
EOSINOPHIL NFR BLD AUTO: 2 % (ref 0–6)
ERYTHROCYTE [DISTWIDTH] IN BLOOD BY AUTOMATED COUNT: 13 % (ref 11.6–15.1)
EST. AVERAGE GLUCOSE BLD GHB EST-MCNC: 189 MG/DL
GFR SERPL CREATININE-BSD FRML MDRD: 60 ML/MIN/1.73SQ M
GLUCOSE P FAST SERPL-MCNC: 110 MG/DL (ref 65–99)
HBA1C MFR BLD: 8.2 %
HCT VFR BLD AUTO: 44 % (ref 36.5–49.3)
HDLC SERPL-MCNC: 37 MG/DL
HGB BLD-MCNC: 14.2 G/DL (ref 12–17)
IMM GRANULOCYTES # BLD AUTO: 0.02 THOUSAND/UL (ref 0–0.2)
IMM GRANULOCYTES NFR BLD AUTO: 0 % (ref 0–2)
LDLC SERPL CALC-MCNC: 129 MG/DL (ref 0–100)
LYMPHOCYTES # BLD AUTO: 1.21 THOUSANDS/ÂΜL (ref 0.6–4.47)
LYMPHOCYTES NFR BLD AUTO: 20 % (ref 14–44)
MCH RBC QN AUTO: 28.4 PG (ref 26.8–34.3)
MCHC RBC AUTO-ENTMCNC: 32.3 G/DL (ref 31.4–37.4)
MCV RBC AUTO: 88 FL (ref 82–98)
MONOCYTES # BLD AUTO: 0.58 THOUSAND/ÂΜL (ref 0.17–1.22)
MONOCYTES NFR BLD AUTO: 10 % (ref 4–12)
NEUTROPHILS # BLD AUTO: 4.02 THOUSANDS/ÂΜL (ref 1.85–7.62)
NEUTS SEG NFR BLD AUTO: 67 % (ref 43–75)
NONHDLC SERPL-MCNC: 160 MG/DL
NRBC BLD AUTO-RTO: 0 /100 WBCS
PLATELET # BLD AUTO: 121 THOUSANDS/UL (ref 149–390)
PLATELET BLD QL SMEAR: ABNORMAL
PMV BLD AUTO: 10.5 FL (ref 8.9–12.7)
POTASSIUM SERPL-SCNC: 3.9 MMOL/L (ref 3.5–5.3)
PROT SERPL-MCNC: 7 G/DL (ref 6.4–8.4)
PSA SERPL-MCNC: 0.35 NG/ML (ref 0–4)
RBC # BLD AUTO: 5 MILLION/UL (ref 3.88–5.62)
RBC MORPH BLD: NORMAL
SODIUM SERPL-SCNC: 142 MMOL/L (ref 135–147)
TRIGL SERPL-MCNC: 153 MG/DL
WBC # BLD AUTO: 6.03 THOUSAND/UL (ref 4.31–10.16)

## 2024-07-27 PROCEDURE — 80053 COMPREHEN METABOLIC PANEL: CPT

## 2024-07-27 PROCEDURE — 36415 COLL VENOUS BLD VENIPUNCTURE: CPT

## 2024-07-27 PROCEDURE — 80061 LIPID PANEL: CPT

## 2024-07-27 PROCEDURE — 83036 HEMOGLOBIN GLYCOSYLATED A1C: CPT

## 2024-07-27 PROCEDURE — G0103 PSA SCREENING: HCPCS

## 2024-07-27 PROCEDURE — 85025 COMPLETE CBC W/AUTO DIFF WBC: CPT

## 2024-09-20 ENCOUNTER — HOSPITAL ENCOUNTER (INPATIENT)
Facility: HOSPITAL | Age: 59
LOS: 5 days | Discharge: HOME | DRG: 392 | End: 2024-09-25
Attending: EMERGENCY MEDICINE | Admitting: INTERNAL MEDICINE
Payer: MEDICARE

## 2024-09-20 ENCOUNTER — APPOINTMENT (INPATIENT)
Dept: CT IMAGING | Facility: HOSPITAL | Age: 59
DRG: 392 | End: 2024-09-20
Payer: MEDICARE

## 2024-09-20 DIAGNOSIS — Z79.4 TYPE 2 DIABETES MELLITUS WITH HYPERGLYCEMIA, WITH LONG-TERM CURRENT USE OF INSULIN (HCC): ICD-10-CM

## 2024-09-20 DIAGNOSIS — R10.84 GENERALIZED ABDOMINAL PAIN: Primary | ICD-10-CM

## 2024-09-20 DIAGNOSIS — R11.2 NAUSEA AND VOMITING: ICD-10-CM

## 2024-09-20 DIAGNOSIS — F41.9 ANXIETY: ICD-10-CM

## 2024-09-20 DIAGNOSIS — K74.60 CIRRHOSIS OF LIVER WITHOUT ASCITES, UNSPECIFIED HEPATIC CIRRHOSIS TYPE (HCC): ICD-10-CM

## 2024-09-20 DIAGNOSIS — E11.10 DIABETIC KETOACIDOSIS WITHOUT COMA ASSOCIATED WITH TYPE 2 DIABETES MELLITUS (HCC): ICD-10-CM

## 2024-09-20 DIAGNOSIS — R19.7 DIARRHEA: ICD-10-CM

## 2024-09-20 DIAGNOSIS — E11.65 TYPE 2 DIABETES MELLITUS WITH HYPERGLYCEMIA, WITH LONG-TERM CURRENT USE OF INSULIN (HCC): ICD-10-CM

## 2024-09-20 DIAGNOSIS — R10.9 ABDOMINAL PAIN: ICD-10-CM

## 2024-09-20 PROBLEM — N17.9 ACUTE KIDNEY INJURY (HCC): Status: ACTIVE | Noted: 2024-09-20

## 2024-09-20 LAB
ALBUMIN SERPL BCG-MCNC: 4.3 G/DL (ref 3.5–5)
ALP SERPL-CCNC: 111 U/L (ref 34–104)
ALT SERPL W P-5'-P-CCNC: 21 U/L (ref 7–52)
AMMONIA PLAS-SCNC: 27 UMOL/L (ref 18–72)
ANION GAP SERPL CALCULATED.3IONS-SCNC: 14 MMOL/L (ref 4–13)
AST SERPL W P-5'-P-CCNC: 19 U/L (ref 13–39)
B-OH-BUTYR SERPL-MCNC: 0.24 MMOL/L (ref 0.02–0.27)
BASOPHILS # BLD AUTO: 0.03 THOUSANDS/ΜL (ref 0–0.1)
BASOPHILS NFR BLD AUTO: 0 % (ref 0–1)
BILIRUB DIRECT SERPL-MCNC: 0.17 MG/DL (ref 0–0.2)
BILIRUB SERPL-MCNC: 0.95 MG/DL (ref 0.2–1)
BUN SERPL-MCNC: 31 MG/DL (ref 5–25)
CALCIUM SERPL-MCNC: 8.9 MG/DL (ref 8.4–10.2)
CHLORIDE SERPL-SCNC: 98 MMOL/L (ref 96–108)
CO2 SERPL-SCNC: 22 MMOL/L (ref 21–32)
CREAT SERPL-MCNC: 2.23 MG/DL (ref 0.6–1.3)
EOSINOPHIL # BLD AUTO: 0 THOUSAND/ΜL (ref 0–0.61)
EOSINOPHIL NFR BLD AUTO: 0 % (ref 0–6)
ERYTHROCYTE [DISTWIDTH] IN BLOOD BY AUTOMATED COUNT: 13.3 % (ref 11.6–15.1)
GFR SERPL CREATININE-BSD FRML MDRD: 31 ML/MIN/1.73SQ M
GLUCOSE SERPL-MCNC: 244 MG/DL (ref 65–140)
GLUCOSE SERPL-MCNC: 375 MG/DL (ref 65–140)
HCT VFR BLD AUTO: 46.1 % (ref 36.5–49.3)
HGB BLD-MCNC: 15.5 G/DL (ref 12–17)
IMM GRANULOCYTES # BLD AUTO: 0.02 THOUSAND/UL (ref 0–0.2)
IMM GRANULOCYTES NFR BLD AUTO: 0 % (ref 0–2)
LIPASE SERPL-CCNC: <6 U/L (ref 11–82)
LYMPHOCYTES # BLD AUTO: 0.87 THOUSANDS/ΜL (ref 0.6–4.47)
LYMPHOCYTES NFR BLD AUTO: 11 % (ref 14–44)
MCH RBC QN AUTO: 29 PG (ref 26.8–34.3)
MCHC RBC AUTO-ENTMCNC: 33.6 G/DL (ref 31.4–37.4)
MCV RBC AUTO: 86 FL (ref 82–98)
MONOCYTES # BLD AUTO: 0.69 THOUSAND/ΜL (ref 0.17–1.22)
MONOCYTES NFR BLD AUTO: 9 % (ref 4–12)
NEUTROPHILS # BLD AUTO: 6.5 THOUSANDS/ΜL (ref 1.85–7.62)
NEUTS SEG NFR BLD AUTO: 80 % (ref 43–75)
NRBC BLD AUTO-RTO: 0 /100 WBCS
PLATELET # BLD AUTO: 126 THOUSANDS/UL (ref 149–390)
PMV BLD AUTO: 10.7 FL (ref 8.9–12.7)
POTASSIUM SERPL-SCNC: 3.8 MMOL/L (ref 3.5–5.3)
PROT SERPL-MCNC: 7.4 G/DL (ref 6.4–8.4)
RBC # BLD AUTO: 5.34 MILLION/UL (ref 3.88–5.62)
SODIUM SERPL-SCNC: 134 MMOL/L (ref 135–147)
WBC # BLD AUTO: 8.11 THOUSAND/UL (ref 4.31–10.16)

## 2024-09-20 PROCEDURE — 82948 REAGENT STRIP/BLOOD GLUCOSE: CPT

## 2024-09-20 PROCEDURE — 99223 1ST HOSP IP/OBS HIGH 75: CPT | Performed by: INTERNAL MEDICINE

## 2024-09-20 PROCEDURE — 99284 EMERGENCY DEPT VISIT MOD MDM: CPT

## 2024-09-20 PROCEDURE — 96360 HYDRATION IV INFUSION INIT: CPT

## 2024-09-20 PROCEDURE — 36415 COLL VENOUS BLD VENIPUNCTURE: CPT | Performed by: EMERGENCY MEDICINE

## 2024-09-20 PROCEDURE — 83690 ASSAY OF LIPASE: CPT | Performed by: EMERGENCY MEDICINE

## 2024-09-20 PROCEDURE — 74176 CT ABD & PELVIS W/O CONTRAST: CPT

## 2024-09-20 PROCEDURE — 82010 KETONE BODYS QUAN: CPT | Performed by: EMERGENCY MEDICINE

## 2024-09-20 PROCEDURE — 85025 COMPLETE CBC W/AUTO DIFF WBC: CPT | Performed by: EMERGENCY MEDICINE

## 2024-09-20 PROCEDURE — 80048 BASIC METABOLIC PNL TOTAL CA: CPT | Performed by: EMERGENCY MEDICINE

## 2024-09-20 PROCEDURE — 82140 ASSAY OF AMMONIA: CPT | Performed by: EMERGENCY MEDICINE

## 2024-09-20 PROCEDURE — 99291 CRITICAL CARE FIRST HOUR: CPT | Performed by: EMERGENCY MEDICINE

## 2024-09-20 PROCEDURE — 80076 HEPATIC FUNCTION PANEL: CPT | Performed by: EMERGENCY MEDICINE

## 2024-09-20 RX ORDER — INSULIN LISPRO 100 [IU]/ML
1-5 INJECTION, SOLUTION INTRAVENOUS; SUBCUTANEOUS
Status: DISCONTINUED | OUTPATIENT
Start: 2024-09-20 | End: 2024-09-25 | Stop reason: HOSPADM

## 2024-09-20 RX ORDER — INSULIN LISPRO 100 [IU]/ML
5 INJECTION, SOLUTION INTRAVENOUS; SUBCUTANEOUS
Status: DISCONTINUED | OUTPATIENT
Start: 2024-09-20 | End: 2024-09-22

## 2024-09-20 RX ORDER — CLOZAPINE 200 MG/1
200 TABLET ORAL
Status: DISCONTINUED | OUTPATIENT
Start: 2024-09-20 | End: 2024-09-25 | Stop reason: HOSPADM

## 2024-09-20 RX ORDER — SERTRALINE HYDROCHLORIDE 100 MG/1
100 TABLET, FILM COATED ORAL DAILY
Status: DISCONTINUED | OUTPATIENT
Start: 2024-09-20 | End: 2024-09-25 | Stop reason: HOSPADM

## 2024-09-20 RX ORDER — PANTOPRAZOLE SODIUM 40 MG/1
40 TABLET, DELAYED RELEASE ORAL DAILY
Status: DISCONTINUED | OUTPATIENT
Start: 2024-09-20 | End: 2024-09-25 | Stop reason: HOSPADM

## 2024-09-20 RX ORDER — INSULIN GLARGINE 100 [IU]/ML
32 INJECTION, SOLUTION SUBCUTANEOUS
Status: DISCONTINUED | OUTPATIENT
Start: 2024-09-20 | End: 2024-09-24

## 2024-09-20 RX ORDER — ALPRAZOLAM 0.5 MG
0.5 TABLET ORAL 2 TIMES DAILY PRN
Status: DISCONTINUED | OUTPATIENT
Start: 2024-09-20 | End: 2024-09-25 | Stop reason: HOSPADM

## 2024-09-20 RX ADMIN — PANTOPRAZOLE SODIUM 40 MG: 40 TABLET, DELAYED RELEASE ORAL at 21:01

## 2024-09-20 RX ADMIN — SODIUM CHLORIDE 1000 ML: 0.9 INJECTION, SOLUTION INTRAVENOUS at 13:42

## 2024-09-20 RX ADMIN — INSULIN LISPRO 5 UNITS: 100 INJECTION, SOLUTION INTRAVENOUS; SUBCUTANEOUS at 16:50

## 2024-09-20 RX ADMIN — SODIUM CHLORIDE 1000 ML: 0.9 INJECTION, SOLUTION INTRAVENOUS at 12:38

## 2024-09-20 RX ADMIN — CLOZAPINE 200 MG: 200 TABLET ORAL at 21:01

## 2024-09-20 RX ADMIN — HUMAN INSULIN 5 UNITS: 100 INJECTION, SOLUTION SUBCUTANEOUS at 13:37

## 2024-09-20 RX ADMIN — INSULIN GLARGINE 32 UNITS: 100 INJECTION, SOLUTION SUBCUTANEOUS at 21:01

## 2024-09-20 RX ADMIN — INSULIN LISPRO 2 UNITS: 100 INJECTION, SOLUTION INTRAVENOUS; SUBCUTANEOUS at 16:50

## 2024-09-20 NOTE — ED PROVIDER NOTES
1. Generalized abdominal pain    2. Nausea and vomiting    3. Diarrhea    4. Diabetic ketoacidosis without coma associated with type 2 diabetes mellitus (HCC)      ED Disposition       ED Disposition   Admit    Condition   Stable    Date/Time   Fri Sep 20, 2024  1:25 PM    Comment   Case was discussed with Dr Irvin and the patient's admission status was agreed to be Admission Status: inpatient status to the service of Dr. Irvin  .               Assessment & Plan       Medical Decision Making  MDM/DDx: Abdominal pain/n/v/d - complications of hepatic cirrhosis, including abdominal ascites, gastritis, duodenitis, acute pancreatitis, acute cholecystitis, less likely but at risk for atypical cardiac etiology, clinically doubt bowel obstruction or ischemia.     I independently reviewed and interpreted ordered labs from this encounter.    A/P: Will check abdominal labs including ammonia, consider imaging, reevaluate for further work up and disposition.    Amount and/or Complexity of Data Reviewed  Labs: ordered. Decision-making details documented in ED Course.    Risk  OTC drugs.  Decision regarding hospitalization.                ED Course as of 09/20/24 1326   Fri Sep 20, 2024   1245 Platelet Count(!): 126  Similar to multiple prior measurements.   1258 ALK PHOS(!): 111  Similar to 1 month ago.   1258 Creatinine(!): 2.23  Previously/recently in the 1.3's.  Will hydrate and admit.   1259 GLUCOSE(!): 375  Prior measurement 5 months ago was 127.   1259 ANION GAP(!): 14  Usually within normal limits.   1323 Results reviewed with patient. All questions answered to the patient's satisfaction.  Will admit.         Medications   sodium chloride 0.9 % bolus 1,000 mL (1,000 mL Intravenous New Bag 9/20/24 1238)   sodium chloride 0.9 % bolus 2,000 mL (has no administration in time range)   insulin regular (HumuLIN R,NovoLIN R) injection 5 Units (has no administration in time range)       History of Present Illness       59-year-old  male presents via EMS with his parents for evaluation of epigastric and periumbilical abdominal pain associated with intermittent vomiting and diarrhea for the past 2 days.  He states he had 6 episodes yesterday and 4 episodes today.  He is not nauseated between episodes but states that anything he has to eat causes vomiting and diarrhea.  He is able to drink sips of water.  He has had episodes like this previously, though sporadically.  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  He does not routinely check his blood sugars at home but thinks they normally run in the 130s to 140.  He presently denies polyuria, polyphagia and polydipsia.    He has a remote history of liver biopsy and ventral hernia repair.  Otherwise, no abdominal surgeries.  No hematochezia, melena or hematemesis. No change in symptoms w/voiding. No recent travel or similar sick contacts. Denies f/c, CP, SOB. 12 system ROS o/w negative.          History provided by:  Patient, medical records and parent  Abdominal Pain  Pain location:  Epigastric and periumbilical  Pain quality: aching    Pain radiates to:  Does not radiate  Pain severity:  Mild  Onset quality:  Unable to specify  Duration:  2 days  Timing:  Intermittent  Progression:  Waxing and waning  Chronicity:  Recurrent  Context: eating    Context: not alcohol use, not awakening from sleep, not sick contacts, not suspicious food intake and not trauma    Relieved by:  None tried  Worsened by:  Vomiting, eating and bowel movements  Ineffective treatments:  None tried  Associated symptoms: anorexia, diarrhea, nausea and vomiting    Associated symptoms: no belching, no chest pain, no chills, no constipation, no cough, no dysuria, no fatigue, no fever, no hematuria, no melena, no shortness of breath and no sore throat    Risk factors: obesity    Risk factors: no alcohol abuse and no NSAID  use        Review of Systems   Constitutional:  Negative for chills, diaphoresis, fatigue and fever.   HENT:  Negative for congestion, rhinorrhea, sore throat and trouble swallowing.    Respiratory:  Negative for cough, chest tightness, shortness of breath and wheezing.    Cardiovascular:  Negative for chest pain, palpitations and leg swelling.   Gastrointestinal:  Positive for abdominal pain, anorexia, diarrhea, nausea and vomiting. Negative for constipation and melena.   Endocrine: Negative for polydipsia, polyphagia and polyuria.   Genitourinary:  Negative for dysuria, flank pain, frequency, hematuria, penile discharge, penile pain, testicular pain and urgency.   Musculoskeletal:  Negative for arthralgias, back pain, myalgias and neck pain.   Skin:  Negative for pallor and rash.   Neurological:  Negative for dizziness, syncope, weakness, light-headedness, numbness and headaches.   Hematological:  Negative for adenopathy.   Psychiatric/Behavioral:  Negative for confusion.    All other systems reviewed and are negative.          Objective     ED Triage Vitals [09/20/24 1208]   Temperature Pulse Blood Pressure Respirations SpO2 Patient Position - Orthostatic VS   99.6 °F (37.6 °C) (!) 112 129/80 20 96 % --      Temp Source Heart Rate Source BP Location FiO2 (%) Pain Score    Oral Monitor -- -- --        Physical Exam  Vitals reviewed.   Constitutional:       General: He is not in acute distress.     Appearance: Normal appearance. He is well-developed. He is obese. He is not ill-appearing, toxic-appearing or diaphoretic.   HENT:      Head: Normocephalic and atraumatic.      Right Ear: External ear normal.      Left Ear: External ear normal.      Nose: Nose normal.      Mouth/Throat:      Mouth: Mucous membranes are moist.      Pharynx: Oropharynx is clear. No oropharyngeal exudate.   Eyes:      General: No scleral icterus.     Conjunctiva/sclera: Conjunctivae normal.      Pupils: Pupils are equal, round, and reactive  to light.   Cardiovascular:      Rate and Rhythm: Normal rate and regular rhythm.      Heart sounds: Normal heart sounds. No murmur heard.  Pulmonary:      Effort: Pulmonary effort is normal. No respiratory distress.      Breath sounds: Normal breath sounds. No wheezing or rales.   Abdominal:      General: Abdomen is protuberant. Bowel sounds are increased.      Palpations: Abdomen is soft. There is no fluid wave.      Tenderness: There is abdominal tenderness in the epigastric area and periumbilical area. There is no right CVA tenderness, left CVA tenderness, guarding or rebound.      Hernia: There is no hernia in the left inguinal area.   Genitourinary:     Penis: Normal.       Testes: Normal.   Musculoskeletal:         General: No tenderness. Normal range of motion.      Cervical back: Normal range of motion and neck supple.      Right lower leg: No edema.      Left lower leg: No edema.   Skin:     General: Skin is warm and dry.   Neurological:      General: No focal deficit present.      Mental Status: He is alert and oriented to person, place, and time.      Motor: No abnormal muscle tone.      Deep Tendon Reflexes: Reflexes are normal and symmetric.   Psychiatric:         Mood and Affect: Mood normal.         Behavior: Behavior normal.         Thought Content: Thought content normal.         Labs Reviewed   CBC AND DIFFERENTIAL - Abnormal       Result Value    WBC 8.11      RBC 5.34      Hemoglobin 15.5      Hematocrit 46.1      MCV 86      MCH 29.0      MCHC 33.6      RDW 13.3      MPV 10.7      Platelets 126 (*)     nRBC 0      Segmented % 80 (*)     Immature Grans % 0      Lymphocytes % 11 (*)     Monocytes % 9      Eosinophils Relative 0      Basophils Relative 0      Absolute Neutrophils 6.50      Absolute Immature Grans 0.02      Absolute Lymphocytes 0.87      Absolute Monocytes 0.69      Eosinophils Absolute 0.00      Basophils Absolute 0.03     LIPASE - Abnormal    Lipase <6 (*)    BASIC METABOLIC  PANEL - Abnormal    Sodium 134 (*)     Potassium 3.8      Chloride 98      CO2 22      ANION GAP 14 (*)     BUN 31 (*)     Creatinine 2.23 (*)     Glucose 375 (*)     Calcium 8.9      eGFR 31      Narrative:     National Kid[employer] Foundation guidelines for Chronic Kidney Disease (CKD):     Stage 1 with normal or high GFR (GFR > 90 mL/min/1.73 square meters)    Stage 2 Mild CKD (GFR = 60-89 mL/min/1.73 square meters)    Stage 3A Moderate CKD (GFR = 45-59 mL/min/1.73 square meters)    Stage 3B Moderate CKD (GFR = 30-44 mL/min/1.73 square meters)    Stage 4 Severe CKD (GFR = 15-29 mL/min/1.73 square meters)    Stage 5 End Stage CKD (GFR <15 mL/min/1.73 square meters)  Note: GFR calculation is accurate only with a steady state creatinine   HEPATIC FUNCTION PANEL - Abnormal    Total Bilirubin 0.95      Bilirubin, Direct 0.17      Alkaline Phosphatase 111 (*)     AST 19      ALT 21      Total Protein 7.4      Albumin 4.3     AMMONIA - Normal    Ammonia 27     BETA HYDROXYBUTYRATE     No orders to display       CriticalCare Time    Date/Time: 9/20/2024 1:26 PM    Performed by: Parminder Oliver DO  Authorized by: Parminder Oliver DO    Critical care provider statement:     Critical care time (minutes):  32    Critical care time was exclusive of:  Separately billable procedures and treating other patients and teaching time    Critical care was necessary to treat or prevent imminent or life-threatening deterioration of the following conditions:  Renal failure and metabolic crisis (DKA)    Critical care was time spent personally by me on the following activities:  Obtaining history from patient or surrogate, development of treatment plan with patient or surrogate, discussions with consultants, evaluation of patient's response to treatment, examination of patient, ordering and performing treatments and interventions, ordering and review of laboratory studies, ordering and review of radiographic studies, re-evaluation of  patient's condition and review of old charts    I assumed direction of critical care for this patient from another provider in my specialty: no        ED Medication and Procedure Management   Prior to Admission Medications   Prescriptions Last Dose Informant Patient Reported? Taking?   ALPRAZolam (XANAX) 0.5 mg tablet  Self Yes No   Sig: Take by mouth 3 (three) times a day   BD PEN NEEDLE TREV U/F 32G X 4 MM MISC  Self Yes No   Sig: use once daily WITH TOUJEO PEN   FLUoxetine (PROzac) 20 mg capsule  Self Yes No   Sig: Take 20 mg by mouth daily   RA MELATONIN 3 MG  Self Yes No   Sig: Take 3 mg by mouth every evening   RA Melatonin 3-2 MG TABS  Self Yes No   Sig: Take 1 tablet by mouth every evening   TOUJEO SOLOSTAR 300 units/mL CONCETRATED U-300 injection pen (1-unit dial)  Self Yes No   Sig: inject 32 units subcutaneously once daily   allopurinol (ZYLOPRIM) 100 mg tablet  Self Yes No   Sig: Take 100 mg by mouth daily   benzonatate (TESSALON) 200 MG capsule   No No   Sig: Take 1 capsule (200 mg total) by mouth 3 (three) times a day as needed for cough   benztropine (COGENTIN) 0.5 mg tablet  Self Yes No   Sig: Take one tablet po bid   citalopram (CeleXA) 20 mg tablet  Self Yes No   Sig: Take one  tablet po daily   cloZAPine (CLOZARIL) 100 mg tablet  Self Yes No   Sig: Take 100 mg by mouth daily Takes one 200mg tab and one 100mg tab at bedtime = 300mg   clozapine (CLOZARIL) 200 MG tablet  Self Yes No   clozapine (CLOZARIL) 50 MG tablet  Self Yes No   Patient not taking: No sig reported   ergocalciferol (VITAMIN D2) 50,000 units  Self Yes No   Sig: Take 50,000 Units by mouth once a week   lithium 300 MG tablet  Self Yes No   Sig: Take 300 mg by mouth 2 (two) times a day   meloxicam (MOBIC) 7.5 mg tablet  Self Yes No   Sig: meloxicam 7.5 mg tablet   metFORMIN (GLUCOPHAGE) 500 mg tablet  Self Yes No   Sig: Take 500 mg by mouth daily with breakfast   methylphenidate (RITALIN) 10 mg tablet  Self Yes No   Sig: take 1 tablet  by mouth twice a day MAXIMUM DAILY DOSE OF 20 milligram   naproxen (EC NAPROSYN) 500 MG EC tablet   No No   Sig: Take 1 tablet (500 mg total) by mouth 2 (two) times a day with meals   pantoprazole (PROTONIX) 40 mg tablet  Self Yes No   Sig: Take 40 mg by mouth daily   sertraline (ZOLOFT) 50 mg tablet   Yes No   Sig: Take 50 mg by mouth daily   sucralfate (CARAFATE) 1 g tablet  Self Yes No   Sig: sucralfate 1 gram tablet   venlafaxine (EFFEXOR-XR) 75 mg 24 hr capsule  Self Yes No   Sig: Take 75 mg by mouth daily      Facility-Administered Medications: None     Patient's Medications   Discharge Prescriptions    No medications on file     No discharge procedures on file.     Parminder Oliver DO  09/20/24 [id number]

## 2024-09-20 NOTE — PLAN OF CARE
Problem: PAIN - ADULT  Goal: Verbalizes/displays adequate comfort level or baseline comfort level  Description: Interventions:  - Encourage patient to monitor pain and request assistance  - Assess pain using appropriate pain scale  - Administer analgesics based on type and severity of pain and evaluate response  - Implement non-pharmacological measures as appropriate and evaluate response  - Consider cultural and social influences on pain and pain management  - Notify physician/advanced practitioner if interventions unsuccessful or patient reports new pain  Outcome: Progressing     Problem: INFECTION - ADULT  Goal: Absence or prevention of progression during hospitalization  Description: INTERVENTIONS:  - Assess and monitor for signs and symptoms of infection  - Monitor lab/diagnostic results  - Monitor all insertion sites, i.e. indwelling lines, tubes, and drains  - Monitor endotracheal if appropriate and nasal secretions for changes in amount and color  - Welcome appropriate cooling/warming therapies per order  - Administer medications as ordered  - Instruct and encourage patient and family to use good hand hygiene technique  - Identify and instruct in appropriate isolation precautions for identified infection/condition  Outcome: Progressing

## 2024-09-20 NOTE — ASSESSMENT & PLAN NOTE
Lab Results   Component Value Date    HGBA1C 8.2 (H) 07/27/2024     With hyperglycemia in the setting of acute infection/stress  Switch Toujeo to Lantus 32 units while in hospital  Add Humalog 5 units with meals and sliding scale  Adjust doses accordingly

## 2024-09-20 NOTE — H&P
H&P - Hospitalist   Name: Sg Kaplan 59 y.o. male I MRN: [id number]  Unit/Bed#: 7T [facility] 706-02 I Date of Admission: 9/20/2024   Date of Service: 9/20/2024 I Hospital Day: 0     Assessment & Plan  Diarrhea of presumed infectious origin  Suspect viral versus bacterial enterocolitis  Check C. Difficile  Check stool enteric panel  Check CT abdomen  Start clear liquids and advance as tolerated  Type 2 diabetes mellitus with hyperglycemia, with long-term current use of insulin (HCC)  Lab Results   Component Value Date    HGBA1C 8.2 (H) 07/27/2024     With hyperglycemia in the setting of acute infection/stress  Switch Toujeo to Lantus 32 units while in hospital  Add Humalog 5 units with meals and sliding scale  Adjust doses accordingly    Acute kidney injury (HCC)  Due to prerenal azotemia from diarrhea and GI loss on top of cirrhosis  He already received 3 L of IV fluids in the ED.  Hold off on further fluids due to risk of volume overload in the setting of cirrhosis  Follow-up CT abdomen  Repeat CBC and BMP in a.m.  Liver cirrhosis (HCC)  History of cirrhosis, reportedly medication induced.  Appears compensated with no encephalopathy  Abdomen is enlarged, soft and dull but with no peripheral edema  Outpatient GI follow-up  Check CT abdomen  Anxiety  Anxiety.  Seen on Clozaril 300 mg daily, Xanax 0.5 mg twice daily as needed and Zoloft 100 mg daily    VTE Pharmacologic Prophylaxis:   ambulate as tolerated  Code Status: No Order full code    History of Present Illness   Chief Complaint: Abdominal pain and diarrhea    Sg Kaplan is a 59 y.o. male with a PMH of cirrhosis, anxiety and diabetes who presents with abdominal pain and diarrhea since yesterday.  He was at his usual state of health until yesterday when he had  at least 7 episodes of loose watery stools mixed with solid.  This was accompanied by abdominal pain and subjective fever.  He had no sick contacts at home.  His abdominal pain and diarrhea persisted until  "today.  He has had at least 5 episodes of diarrhea without blood.  Presented to the ER where he was found to have severe hyperglycemia and was referred to medicine for admission.    Review of Systems   Constitutional:  Positive for fever.   HENT: Negative.     Respiratory: Negative.     Cardiovascular: Negative.    Gastrointestinal:  Positive for abdominal distention, abdominal pain and diarrhea.   Genitourinary: Negative.    Musculoskeletal: Negative.    Psychiatric/Behavioral: Negative.     All other systems reviewed and are negative.      I have reviewed the patient's PMH, PSH, Social History, Family History, Meds, and Allergies  Historical Information   Past Medical History:   Diagnosis Date    Anemia     Bipolar disorder (HCC)     Diabetes mellitus (HCC)     Diabetes mellitus, type II (HCC)     Gout     PAT (paroxysmal atrial tachycardia)     Schizophrenic disorder (HCC)      History reviewed. No pertinent surgical history.  Social History     Tobacco Use    Smoking status: Every Day    Smokeless tobacco: Never   Substance and Sexual Activity    Alcohol use: No    Drug use: No    Sexual activity: Not on file     E-Cigarette/Vaping     E-Cigarette/Vaping Substances     Family History   Problem Relation Age of Onset    Coronary artery disease Mother         premature     Social History:  Marital Status: Single   Occupation: none  Patient Pre-hospital Living Situation: Home  Patient Pre-hospital Level of Mobility: walks  Patient Pre-hospital Diet Restrictions: none    Objective     Vitals:   Blood Pressure: 128/80 (09/20/24 1345)  Pulse: 98 (09/20/24 1345)  Temperature: 97.5 °F (36.4 °C) (09/20/24 1345)  Temp Source: Tympanic (09/20/24 1345)  Respirations: 20 (09/20/24 1345)  Height: 6' 0.01\" (182.9 cm) (09/20/24 1345)  Weight - Scale: 94.1 kg (207 lb 7.3 oz) (09/20/24 1345)  SpO2: 96 % (09/20/24 1208)    Physical Exam  Vitals reviewed.   Constitutional:       Appearance: He is not ill-appearing.   HENT:      " Head: Normocephalic and atraumatic.      Nose: No congestion or rhinorrhea.   Eyes:      General: No scleral icterus.  Cardiovascular:      Rate and Rhythm: Normal rate and regular rhythm.   Pulmonary:      Breath sounds: No stridor. No wheezing or rhonchi.   Abdominal:      Comments: Large globular abdomen.  Soft.  Dull to percussion.   Musculoskeletal:      Right lower leg: No edema.      Left lower leg: No edema.   Skin:     General: Skin is warm and dry.   Neurological:      Mental Status: He is oriented to person, place, and time.   Psychiatric:         Mood and Affect: Mood normal.         Behavior: Behavior normal.         Lines/Drains:  Lines/Drains/Airways       Active Status       None                        Additional Data:   Lab Results: I have reviewed the following results:   Results from last 7 days   Lab Units 09/20/24  1230   WBC Thousand/uL 8.11   HEMOGLOBIN g/dL 15.5   HEMATOCRIT % 46.1   PLATELETS Thousands/uL 126*   SEGS PCT % 80*   LYMPHO PCT % 11*   MONO PCT % 9   EOS PCT % 0     Results from last 7 days   Lab Units 09/20/24  1230   SODIUM mmol/L 134*   POTASSIUM mmol/L 3.8   CHLORIDE mmol/L 98   CO2 mmol/L 22   BUN mg/dL 31*   CREATININE mg/dL 2.23*   ANION GAP mmol/L 14*   CALCIUM mg/dL 8.9   ALBUMIN g/dL 4.3   TOTAL BILIRUBIN mg/dL 0.95   ALK PHOS U/L 111*   ALT U/L 21   AST U/L 19   GLUCOSE RANDOM mg/dL 375*             Lab Results   Component Value Date    HGBA1C 8.2 (H) 07/27/2024    HGBA1C 7.7 (H) 04/23/2024    HGBA1C 8.0 (H) 04/20/2024           Imaging Review: CT abdomen ordered.      ** Please Note: This note has been constructed using a voice recognition system. **

## 2024-09-20 NOTE — ASSESSMENT & PLAN NOTE
Suspect viral versus bacterial enterocolitis  Check C. Difficile  Check stool enteric panel  Check CT abdomen  Start clear liquids and advance as tolerated

## 2024-09-20 NOTE — ASSESSMENT & PLAN NOTE
Due to prerenal azotemia from diarrhea and GI loss on top of cirrhosis  He already received 3 L of IV fluids in the ED.  Hold off on further fluids due to risk of volume overload in the setting of cirrhosis  Follow-up CT abdomen  Repeat CBC and BMP in a.m.

## 2024-09-20 NOTE — ASSESSMENT & PLAN NOTE
Anxiety.  Seen on Clozaril 300 mg daily, Xanax 0.5 mg twice daily as needed and Zoloft 100 mg daily

## 2024-09-20 NOTE — ASSESSMENT & PLAN NOTE
History of cirrhosis, reportedly medication induced.  Appears compensated with no encephalopathy  Abdomen is enlarged, soft and dull but with no peripheral edema  Outpatient GI follow-up  Check CT abdomen

## 2024-09-21 LAB
ANION GAP SERPL CALCULATED.3IONS-SCNC: 12 MMOL/L (ref 4–13)
BUN SERPL-MCNC: 27 MG/DL (ref 5–25)
CALCIUM SERPL-MCNC: 8.6 MG/DL (ref 8.4–10.2)
CHLORIDE SERPL-SCNC: 108 MMOL/L (ref 96–108)
CO2 SERPL-SCNC: 23 MMOL/L (ref 21–32)
CREAT SERPL-MCNC: 1.66 MG/DL (ref 0.6–1.3)
ERYTHROCYTE [DISTWIDTH] IN BLOOD BY AUTOMATED COUNT: 13.4 % (ref 11.6–15.1)
GFR SERPL CREATININE-BSD FRML MDRD: 44 ML/MIN/1.73SQ M
GLUCOSE SERPL-MCNC: 108 MG/DL (ref 65–140)
GLUCOSE SERPL-MCNC: 115 MG/DL (ref 65–140)
GLUCOSE SERPL-MCNC: 162 MG/DL (ref 65–140)
GLUCOSE SERPL-MCNC: 200 MG/DL (ref 65–140)
GLUCOSE SERPL-MCNC: 217 MG/DL (ref 65–140)
GLUCOSE SERPL-MCNC: 66 MG/DL (ref 65–140)
GLUCOSE SERPL-MCNC: 72 MG/DL (ref 65–140)
HCT VFR BLD AUTO: 41.9 % (ref 36.5–49.3)
HGB BLD-MCNC: 14.7 G/DL (ref 12–17)
MCH RBC QN AUTO: 29.6 PG (ref 26.8–34.3)
MCHC RBC AUTO-ENTMCNC: 35.1 G/DL (ref 31.4–37.4)
MCV RBC AUTO: 85 FL (ref 82–98)
PLATELET # BLD AUTO: 121 THOUSANDS/UL (ref 149–390)
PMV BLD AUTO: 11 FL (ref 8.9–12.7)
POTASSIUM SERPL-SCNC: 3.4 MMOL/L (ref 3.5–5.3)
RBC # BLD AUTO: 4.96 MILLION/UL (ref 3.88–5.62)
SODIUM SERPL-SCNC: 143 MMOL/L (ref 135–147)
WBC # BLD AUTO: 5.28 THOUSAND/UL (ref 4.31–10.16)

## 2024-09-21 PROCEDURE — 85027 COMPLETE CBC AUTOMATED: CPT | Performed by: INTERNAL MEDICINE

## 2024-09-21 PROCEDURE — 82948 REAGENT STRIP/BLOOD GLUCOSE: CPT

## 2024-09-21 PROCEDURE — 80048 BASIC METABOLIC PNL TOTAL CA: CPT | Performed by: INTERNAL MEDICINE

## 2024-09-21 PROCEDURE — 99232 SBSQ HOSP IP/OBS MODERATE 35: CPT | Performed by: INTERNAL MEDICINE

## 2024-09-21 RX ORDER — SODIUM CHLORIDE 9 MG/ML
75 INJECTION, SOLUTION INTRAVENOUS CONTINUOUS
Status: DISCONTINUED | OUTPATIENT
Start: 2024-09-21 | End: 2024-09-21

## 2024-09-21 RX ORDER — ONDANSETRON 2 MG/ML
4 INJECTION INTRAMUSCULAR; INTRAVENOUS EVERY 6 HOURS PRN
Status: DISCONTINUED | OUTPATIENT
Start: 2024-09-21 | End: 2024-09-25 | Stop reason: HOSPADM

## 2024-09-21 RX ORDER — POTASSIUM CHLORIDE 1500 MG/1
20 TABLET, EXTENDED RELEASE ORAL ONCE
Status: COMPLETED | OUTPATIENT
Start: 2024-09-21 | End: 2024-09-21

## 2024-09-21 RX ADMIN — CLOZAPINE 200 MG: 200 TABLET ORAL at 21:37

## 2024-09-21 RX ADMIN — SERTRALINE HYDROCHLORIDE 100 MG: 100 TABLET ORAL at 08:12

## 2024-09-21 RX ADMIN — ONDANSETRON 4 MG: 2 INJECTION INTRAMUSCULAR; INTRAVENOUS at 16:15

## 2024-09-21 RX ADMIN — POTASSIUM CHLORIDE 20 MEQ: 1500 TABLET, EXTENDED RELEASE ORAL at 11:35

## 2024-09-21 RX ADMIN — INSULIN LISPRO 1 UNITS: 100 INJECTION, SOLUTION INTRAVENOUS; SUBCUTANEOUS at 16:03

## 2024-09-21 RX ADMIN — INSULIN LISPRO 5 UNITS: 100 INJECTION, SOLUTION INTRAVENOUS; SUBCUTANEOUS at 11:33

## 2024-09-21 RX ADMIN — INSULIN LISPRO 1 UNITS: 100 INJECTION, SOLUTION INTRAVENOUS; SUBCUTANEOUS at 11:33

## 2024-09-21 RX ADMIN — INSULIN LISPRO 5 UNITS: 100 INJECTION, SOLUTION INTRAVENOUS; SUBCUTANEOUS at 08:13

## 2024-09-21 RX ADMIN — SODIUM CHLORIDE 500 ML: 0.9 INJECTION, SOLUTION INTRAVENOUS at 11:36

## 2024-09-21 RX ADMIN — PANTOPRAZOLE SODIUM 40 MG: 40 TABLET, DELAYED RELEASE ORAL at 08:12

## 2024-09-21 RX ADMIN — INSULIN LISPRO 5 UNITS: 100 INJECTION, SOLUTION INTRAVENOUS; SUBCUTANEOUS at 16:04

## 2024-09-21 NOTE — ASSESSMENT & PLAN NOTE
Anxiety.  Continue  Clozaril 200 mg daily, Xanax 0.5 mg twice daily as needed and Zoloft 100 mg daily

## 2024-09-21 NOTE — PROGRESS NOTES
Progress Note - Hospitalist   Name: Sg Kaplan 59 y.o. male I MRN: [id number]  Unit/Bed#: 7T [facility] 706-02 I Date of Admission: 9/20/2024   Date of Service: 9/21/2024 I Hospital Day: 1    Assessment & Plan  Diarrhea of presumed infectious origin  Likely viral.  Low suspicion for C. difficile or bacterial etiology.  He has had no further stools and there is no stool sample to check for C. difficile or bacterial panel.  CT abdomen showed mild diffuse enteritis/ileus  Tolerating diet with no nausea or vomiting  Advance to diabetic solid diet continue Toujeo  Type 2 diabetes mellitus with hyperglycemia, with long-term current use of insulin (HCC)  Lab Results   Component Value Date    HGBA1C 8.2 (H) 07/27/2024     With hyperglycemia in the setting of acute infection/stress  Continue Lantus 32 units at night  Continue Humalog 5 units with meals and sliding scale and adjust as needed  Acute kidney injury (HCC)  Due to prerenal azotemia from diarrhea and GI loss on top of cirrhosis  He already received 3 L of IV fluids in the ED.  Creatinine down to 1.66  Advance diet  Will give additional 500 mL bolus today (CT abdomen with no ascites so we have room for more hydration)  Anticipate renal function back to normal tomorrow  Liver cirrhosis (HCC)  History of cirrhosis, reportedly medication induced.  Appears compensated with no encephalopathy  CT abdomen without ascites.    No peripheral edema  Outpatient GI follow-up  Anxiety  Anxiety.  Continue  Clozaril 200 mg daily, Xanax 0.5 mg twice daily as needed and Zoloft 100 mg daily    VTE Pharmacologic Prophylaxis: VTE Score: 1 ambulate as tolerated  Patient Centered Rounds: I performed bedside rounds with nursing staff today.     Education and Discussions with Family / Patient: Patient declined call to .     Current Length of Stay: 1 day(s)  Current Patient Status: Inpatient   Certification Statement: The patient will continue to require additional inpatient  hospital stay due to DENZEL  Discharge Plan: Anticipate discharge tomorrow to home.    Code Status: Level 1 - Full Code    Subjective   No diarrhea  No fever  Since he has had no diarrhea, we could not collect a sample for C. difficile studies or stool enteric pathogen studies  Tolerated clear liquid    Objective     Vitals:   Temp (24hrs), Av.3 °F (36.8 °C), Min:97.1 °F (36.2 °C), Max:99.6 °F (37.6 °C)    Temp:  [97.1 °F (36.2 °C)-99.6 °F (37.6 °C)] 97.1 °F (36.2 °C)  HR:  [] 94  Resp:  [18-20] 18  BP: ()/(68-80) 97/78  SpO2:  [93 %-96 %] 96 %  Body mass index is 28.13 kg/m².     Input and Output Summary (last 24 hours):     Intake/Output Summary (Last 24 hours) at 2024 0952  Last data filed at 2024 1649  Gross per 24 hour   Intake 360 ml   Output --   Net 360 ml       Physical Exam  Vitals reviewed.   Constitutional:       Appearance: He is not ill-appearing.   HENT:      Head: Normocephalic and atraumatic.      Nose: No congestion or rhinorrhea.   Eyes:      General: No scleral icterus.  Cardiovascular:      Rate and Rhythm: Normal rate and regular rhythm.   Pulmonary:      Breath sounds: No wheezing or rhonchi.   Abdominal:      Comments: Large protuberant soft abdomen   Musculoskeletal:      Right lower leg: No edema.      Left lower leg: No edema.   Skin:     General: Skin is warm and dry.   Neurological:      Comments: Awake alert coherent   Psychiatric:         Mood and Affect: Mood normal.         Behavior: Behavior normal.        Lines/Drains:  Lines/Drains/Airways       Active Status       None                            Lab Results: I have reviewed the following results:    Results from last 7 days   Lab Units 24  0555 24  1230   WBC Thousand/uL 5.28 8.11   HEMOGLOBIN g/dL 14.7 15.5   HEMATOCRIT % 41.9 46.1   PLATELETS Thousands/uL 121* 126*   SEGS PCT %  --  80*   LYMPHO PCT %  --  11*   MONO PCT %  --  9   EOS PCT %  --  0     Results from last 7 days   Lab Units  09/21/24  0555 09/20/24  1230   SODIUM mmol/L 143 134*   POTASSIUM mmol/L 3.4* 3.8   CHLORIDE mmol/L 108 98   CO2 mmol/L 23 22   BUN mg/dL 27* 31*   CREATININE mg/dL 1.66* 2.23*   ANION GAP mmol/L 12 14*   CALCIUM mg/dL 8.6 8.9   ALBUMIN g/dL  --  4.3   TOTAL BILIRUBIN mg/dL  --  0.95   ALK PHOS U/L  --  111*   ALT U/L  --  21   AST U/L  --  19   GLUCOSE RANDOM mg/dL 66 375*         Results from last 7 days   Lab Units 09/21/24  0859 09/21/24  0600 09/20/24  1612   POC GLUCOSE mg/dl 217* 72 244*               Recent Cultures (last 7 days):         Imaging Review: Reviewed radiology reports from this admission including: CT abdomen/pelvis.  Last 24 Hours Medication List:     Current Facility-Administered Medications:     ALPRAZolam (XANAX) tablet 0.5 mg, BID PRN    clozapine (CLOZARIL) tablet 200 mg, HS    insulin glargine (LANTUS) subcutaneous injection 32 Units 0.32 mL, HS    insulin lispro (HumALOG/ADMELOG) 100 units/mL subcutaneous injection 1-5 Units, TID AC **AND** Fingerstick Glucose (POCT), TID AC    insulin lispro (HumALOG/ADMELOG) 100 units/mL subcutaneous injection 5 Units, TID With Meals    pantoprazole (PROTONIX) EC tablet 40 mg, Daily    potassium chloride (Klor-Con M20) CR tablet 20 mEq, Once    sertraline (ZOLOFT) tablet 100 mg, Daily    sodium chloride 0.9 % infusion, Continuous    Administrative Statements   Today, Patient Was Seen By: Daniel Irvin MD      **Please Note: This note may have been constructed using a voice recognition system.**

## 2024-09-21 NOTE — ASSESSMENT & PLAN NOTE
Lab Results   Component Value Date    HGBA1C 8.2 (H) 07/27/2024     With hyperglycemia in the setting of acute infection/stress  Continue Lantus 32 units at night  Continue Humalog 5 units with meals and sliding scale and adjust as needed

## 2024-09-21 NOTE — ASSESSMENT & PLAN NOTE
History of cirrhosis, reportedly medication induced.  Appears compensated with no encephalopathy  CT abdomen without ascites.    No peripheral edema  Outpatient GI follow-up   Pt is scheduled for exam under anesthesia with possible hymenectomy on 1/8/2020 at 7:30am. Pre op apt is scheduled on 12/16/2019. Pt is agreeable to date and time of surgery.

## 2024-09-21 NOTE — ASSESSMENT & PLAN NOTE
Due to prerenal azotemia from diarrhea and GI loss on top of cirrhosis  He already received 3 L of IV fluids in the ED.  Creatinine down to 1.66  Advance diet  Will give additional 500 mL bolus today (CT abdomen with no ascites so we have room for more hydration)  Anticipate renal function back to normal tomorrow

## 2024-09-21 NOTE — PLAN OF CARE
Problem: PAIN - ADULT  Goal: Verbalizes/displays adequate comfort level or baseline comfort level  Description: Interventions:  - Encourage patient to monitor pain and request assistance  - Assess pain using appropriate pain scale  - Administer analgesics based on type and severity of pain and evaluate response  - Implement non-pharmacological measures as appropriate and evaluate response  - Consider cultural and social influences on pain and pain management  - Notify physician/advanced practitioner if interventions unsuccessful or patient reports new pain  Outcome: Progressing     Problem: INFECTION - ADULT  Goal: Absence or prevention of progression during hospitalization  Description: INTERVENTIONS:  - Assess and monitor for signs and symptoms of infection  - Monitor lab/diagnostic results  - Monitor all insertion sites, i.e. indwelling lines, tubes, and drains  - Monitor endotracheal if appropriate and nasal secretions for changes in amount and color  - [place] appropriate cooling/warming therapies per order  - Administer medications as ordered  - Instruct and encourage patient and family to use good hand hygiene technique  - Identify and instruct in appropriate isolation precautions for identified infection/condition  Outcome: Progressing  Goal: Absence of fever/infection during neutropenic period  Description: INTERVENTIONS:  - Monitor WBC    Outcome: Progressing     Problem: SAFETY ADULT  Goal: Patient will remain free of falls  Description: INTERVENTIONS:  - Educate patient/family on patient safety including physical limitations  - Instruct patient to call for assistance with activity   - Consult OT/PT to assist with strengthening/mobility   - Keep Call bell within reach  - Keep bed low and locked with side rails adjusted as appropriate  - Keep care items and personal belongings within reach  - Initiate and maintain comfort rounds  - Make Fall Risk Sign visible to staff  - Offer Toileting every 2 Hours,  in advance of need  - Initiate/Maintain bed alarm  - Obtain necessary fall risk management equipment: bed  alarm  - Apply yellow socks and bracelet for high fall risk  - Consider moving patient to room near nurses station  Outcome: Progressing  Goal: Maintain or return to baseline ADL function  Description: INTERVENTIONS:  -  Assess patient's ability to carry out ADLs; assess patient's baseline for ADL function and identify physical deficits which impact ability to perform ADLs (bathing, care of mouth/teeth, toileting, grooming, dressing, etc.)  - Assess/evaluate cause of self-care deficits   - Assess range of motion  - Assess patient's mobility; develop plan if impaired  - Assess patient's need for assistive devices and provide as appropriate  - Encourage maximum independence but intervene and supervise when necessary  - Involve family in performance of ADLs  - Assess for home care needs following discharge   - Consider OT consult to assist with ADL evaluation and planning for discharge  - Provide patient education as appropriate  Outcome: Progressing  Goal: Maintains/Returns to pre admission functional level  Description: INTERVENTIONS:  - Perform AM-PAC 6 Click Basic Mobility/ Daily Activity assessment daily.  - Set and communicate daily mobility goal to care team and patient/family/caregiver.   - Collaborate with rehabilitation services on mobility goals if consulted  - Perform Range of Motion 2 times a day.  - Reposition patient every 2 hours.  - Dangle patient 2 times a day  - Stand patient 2 times a day  - Ambulate patient 2 times a day  - Out of bed to chair 2 times a day   - Out of bed for meals 2 times a day  - Out of bed for toileting  - Record patient progress and toleration of activity level   Outcome: Progressing     Problem: DISCHARGE PLANNING  Goal: Discharge to home or other facility with appropriate resources  Description: INTERVENTIONS:  - Identify barriers to discharge w/patient and caregiver  -  Arrange for needed discharge resources and transportation as appropriate  - Identify discharge learning needs (meds, wound care, etc.)  - Arrange for interpretive services to assist at discharge as needed  - Refer to Case Management Department for coordinating discharge planning if the patient needs post-hospital services based on physician/advanced practitioner order or complex needs related to functional status, cognitive ability, or social support system  Outcome: Progressing     Problem: Knowledge Deficit  Goal: Patient/family/caregiver demonstrates understanding of disease process, treatment plan, medications, and discharge instructions  Description: Complete learning assessment and assess knowledge base.  Interventions:  - Provide teaching at level of understanding  - Provide teaching via preferred learning methods  Outcome: Progressing

## 2024-09-21 NOTE — ASSESSMENT & PLAN NOTE
Likely viral.  Low suspicion for C. difficile or bacterial etiology.  He has had no further stools and there is no stool sample to check for C. difficile or bacterial panel.  CT abdomen showed mild diffuse enteritis/ileus  Tolerating diet with no nausea or vomiting  [facility] solid [facility]

## 2024-09-22 LAB
ANION GAP SERPL CALCULATED.3IONS-SCNC: 10 MMOL/L (ref 4–13)
BASOPHILS # BLD AUTO: 0.02 THOUSANDS/ΜL (ref 0–0.1)
BASOPHILS NFR BLD AUTO: 0 % (ref 0–1)
BUN SERPL-MCNC: 25 MG/DL (ref 5–25)
CALCIUM SERPL-MCNC: 8.9 MG/DL (ref 8.4–10.2)
CHLORIDE SERPL-SCNC: 107 MMOL/L (ref 96–108)
CO2 SERPL-SCNC: 24 MMOL/L (ref 21–32)
CREAT SERPL-MCNC: 1.6 MG/DL (ref 0.6–1.3)
EOSINOPHIL # BLD AUTO: 0.19 THOUSAND/ΜL (ref 0–0.61)
EOSINOPHIL NFR BLD AUTO: 3 % (ref 0–6)
ERYTHROCYTE [DISTWIDTH] IN BLOOD BY AUTOMATED COUNT: 13.2 % (ref 11.6–15.1)
GFR SERPL CREATININE-BSD FRML MDRD: 46 ML/MIN/1.73SQ M
GLUCOSE SERPL-MCNC: 107 MG/DL (ref 65–140)
GLUCOSE SERPL-MCNC: 146 MG/DL (ref 65–140)
GLUCOSE SERPL-MCNC: 154 MG/DL (ref 65–140)
GLUCOSE SERPL-MCNC: 158 MG/DL (ref 65–140)
GLUCOSE SERPL-MCNC: 181 MG/DL (ref 65–140)
HCT VFR BLD AUTO: 43.1 % (ref 36.5–49.3)
HGB BLD-MCNC: 14 G/DL (ref 12–17)
IMM GRANULOCYTES # BLD AUTO: 0.04 THOUSAND/UL (ref 0–0.2)
IMM GRANULOCYTES NFR BLD AUTO: 1 % (ref 0–2)
LYMPHOCYTES # BLD AUTO: 1.19 THOUSANDS/ΜL (ref 0.6–4.47)
LYMPHOCYTES NFR BLD AUTO: 20 % (ref 14–44)
MCH RBC QN AUTO: 28.9 PG (ref 26.8–34.3)
MCHC RBC AUTO-ENTMCNC: 32.5 G/DL (ref 31.4–37.4)
MCV RBC AUTO: 89 FL (ref 82–98)
MONOCYTES # BLD AUTO: 0.79 THOUSAND/ΜL (ref 0.17–1.22)
MONOCYTES NFR BLD AUTO: 13 % (ref 4–12)
NEUTROPHILS # BLD AUTO: 3.88 THOUSANDS/ΜL (ref 1.85–7.62)
NEUTS SEG NFR BLD AUTO: 63 % (ref 43–75)
NRBC BLD AUTO-RTO: 0 /100 WBCS
PLATELET # BLD AUTO: 117 THOUSANDS/UL (ref 149–390)
PMV BLD AUTO: 10.8 FL (ref 8.9–12.7)
POTASSIUM SERPL-SCNC: 3.6 MMOL/L (ref 3.5–5.3)
RBC # BLD AUTO: 4.85 MILLION/UL (ref 3.88–5.62)
SODIUM SERPL-SCNC: 141 MMOL/L (ref 135–147)
WBC # BLD AUTO: 6.11 THOUSAND/UL (ref 4.31–10.16)

## 2024-09-22 PROCEDURE — 82948 REAGENT STRIP/BLOOD GLUCOSE: CPT

## 2024-09-22 PROCEDURE — 99232 SBSQ HOSP IP/OBS MODERATE 35: CPT | Performed by: INTERNAL MEDICINE

## 2024-09-22 PROCEDURE — 85025 COMPLETE CBC W/AUTO DIFF WBC: CPT | Performed by: INTERNAL MEDICINE

## 2024-09-22 PROCEDURE — 80048 BASIC METABOLIC PNL TOTAL CA: CPT | Performed by: INTERNAL MEDICINE

## 2024-09-22 RX ORDER — INSULIN LISPRO 100 [IU]/ML
3 INJECTION, SOLUTION INTRAVENOUS; SUBCUTANEOUS
Status: DISCONTINUED | OUTPATIENT
Start: 2024-09-22 | End: 2024-09-22

## 2024-09-22 RX ORDER — DEXTROSE MONOHYDRATE 25 G/50ML
INJECTION, SOLUTION INTRAVENOUS
Status: DISCONTINUED
Start: 2024-09-22 | End: 2024-09-22 | Stop reason: WASHOUT

## 2024-09-22 RX ORDER — SODIUM CHLORIDE, SODIUM GLUCONATE, SODIUM ACETATE, POTASSIUM CHLORIDE, MAGNESIUM CHLORIDE, SODIUM PHOSPHATE, DIBASIC, AND POTASSIUM PHOSPHATE .53; .5; .37; .037; .03; .012; .00082 G/100ML; G/100ML; G/100ML; G/100ML; G/100ML; G/100ML; G/100ML
100 INJECTION, SOLUTION INTRAVENOUS CONTINUOUS
Status: DISPENSED | OUTPATIENT
Start: 2024-09-22 | End: 2024-09-22

## 2024-09-22 RX ADMIN — INSULIN LISPRO 1 UNITS: 100 INJECTION, SOLUTION INTRAVENOUS; SUBCUTANEOUS at 12:38

## 2024-09-22 RX ADMIN — SERTRALINE HYDROCHLORIDE 100 MG: 100 TABLET ORAL at 08:33

## 2024-09-22 RX ADMIN — INSULIN LISPRO 3 UNITS: 100 INJECTION, SOLUTION INTRAVENOUS; SUBCUTANEOUS at 08:35

## 2024-09-22 RX ADMIN — INSULIN GLARGINE 32 UNITS: 100 INJECTION, SOLUTION SUBCUTANEOUS at 21:31

## 2024-09-22 RX ADMIN — SODIUM CHLORIDE, SODIUM GLUCONATE, SODIUM ACETATE, POTASSIUM CHLORIDE, MAGNESIUM CHLORIDE, SODIUM PHOSPHATE, DIBASIC, AND POTASSIUM PHOSPHATE 100 ML/HR: .53; .5; .37; .037; .03; .012; .00082 INJECTION, SOLUTION INTRAVENOUS at 08:57

## 2024-09-22 RX ADMIN — CLOZAPINE 200 MG: 200 TABLET ORAL at 21:32

## 2024-09-22 RX ADMIN — PANTOPRAZOLE SODIUM 40 MG: 40 TABLET, DELAYED RELEASE ORAL at 08:33

## 2024-09-22 RX ADMIN — INSULIN GLARGINE 32 UNITS: 100 INJECTION, SOLUTION SUBCUTANEOUS at 00:35

## 2024-09-22 NOTE — ASSESSMENT & PLAN NOTE
Due to prerenal azotemia from diarrhea and GI loss on top of cirrhosis  Creatinine 2.23 on admission, baseline 1.2-1.3  Creatinine still @ 1.60  Advance diet  Will give additional fluids today

## 2024-09-22 NOTE — QUICK NOTE
Notified of HS blood sugar of 108 that improved slightly to 115 with snack. Morning blood sugar 72 yesterday but sugars during the day 160-200s. Will give lantus but decrease standing humalog to 3U TID with meals from 5U.

## 2024-09-22 NOTE — PROGRESS NOTES
Progress Note - Hospitalist   Name: Sg Kaplan 59 y.o. male I MRN: [id number]  Unit/Bed#: 7T [facility] 706-02 I Date of Admission: 9/20/2024   Date of Service: 9/22/2024 I Hospital Day: 2    Assessment & Plan  Acute kidney injury (HCC)  Due to prerenal azotemia from diarrhea and GI loss on top of cirrhosis  Creatinine 2.23 on admission, baseline 1.2-1.3  Creatinine still @ 1.60  Advance diet  Will give additional fluids today  Diarrhea of presumed infectious origin  Likely viral.  Low suspicion for C. difficile or bacterial etiology.  He has had no further stools and there is no stool sample to check for C. difficile or bacterial panel.  CT abdomen showed mild diffuse enteritis/ileus  Diarrhea resolved  Continue diabetic diet   Type 2 diabetes mellitus with hyperglycemia, with long-term current use of insulin ([facility])  Lab Results   Component Value Date    HGBA1C 8.2 (H) 07/27/2024     With hyperglycemia on admission in the setting of acute infection/stress, resolved  Continue Lantus 32 units at night  DC standing dose humalog to avoid hypoglycemia  Continue sliding scale and adjust as needed  Liver cirrhosis (HCC)  History of cirrhosis, reportedly medication induced.  Compensated with no encephalopathy  CT with no ascites.  No peripheral edema on exam.  Albumin normal at 4.3.  Outpatient GI follow-up  Anxiety  Mood stable.  Continue  Clozaril 200 mg daily, Xanax 0.5 mg twice daily as needed and Zoloft 100 mg daily    VTE Pharmacologic Prophylaxis: VTE Score: 2 Low Risk (Score 0-2) - Encourage Ambulation.    Education and Discussions with Family / Patient: Patient declined call to .     Updated CM via epic chat    Current Length of Stay: 2 day(s)  Current Patient Status: Inpatient   Certification Statement: The patient will continue to require additional inpatient hospital stay due to DENZEL  Discharge Plan: Anticipate discharge tomorrow to home.    Code Status: Level 1 - Full Code    Subjective   Seen and  "examined during rounds.  Diarrhea resolved  No nausea or vomiting  Discussed his lab work today.  When I told him that his creatinine is still not back to baseline, he stated \"Okay I'l stay another day\"    Objective     Vitals:   Temp (24hrs), Av.8 °F (36.6 °C), Min:97.3 °F (36.3 °C), Max:98.6 °F (37 °C)    Temp:  [97.3 °F (36.3 °C)-98.6 °F (37 °C)] 97.3 °F (36.3 °C)  HR:  [89-91] 91  Resp:  [17-18] 17  BP: (102-118)/(67-85) 112/85  SpO2:  [94 %-96 %] 94 %  Body mass index is 28.13 kg/m².     Input and Output Summary (last 24 hours):     Intake/Output Summary (Last 24 hours) at 2024 0841  Last data filed at 2024 1700  Gross per 24 hour   Intake 120 ml   Output --   Net 120 ml       Physical Exam  Vitals reviewed.   Constitutional:       Appearance: He is not ill-appearing or diaphoretic.   HENT:      Head: Normocephalic and atraumatic.      Nose: No congestion or rhinorrhea.   Eyes:      General: No scleral icterus.  Cardiovascular:      Rate and Rhythm: Regular rhythm.   Pulmonary:      Breath sounds: No wheezing or rhonchi.   Abdominal:      General: There is no distension.      Tenderness: There is no abdominal tenderness.      Comments: Abdomen is large, protuberant and soft      Musculoskeletal:      Right lower leg: No edema.      Left lower leg: No edema.   Skin:     General: Skin is warm and dry.   Neurological:      Comments: Awake alert coherent   Psychiatric:         Mood and Affect: Mood normal.         Behavior: Behavior normal.      Lines/Drains:  Lines/Drains/Airways       Active Status       None                            Lab Results: I have reviewed the following results:    Results from last 7 days   Lab Units 24  0525   WBC Thousand/uL 6.11   HEMOGLOBIN g/dL 14.0   HEMATOCRIT % 43.1   PLATELETS Thousands/uL 117*   SEGS PCT % 63   LYMPHO PCT % 20   MONO PCT % 13*   EOS PCT % 3     Results from last 7 days   Lab Units 24  0525 24  0555 24  1230   SODIUM mmol/L " 141   < > 134*   POTASSIUM mmol/L 3.6   < > 3.8   CHLORIDE mmol/L 107   < > 98   CO2 mmol/L 24   < > 22   BUN mg/dL 25   < > 31*   CREATININE mg/dL 1.60*   < > 2.23*   ANION GAP mmol/L 10   < > 14*   CALCIUM mg/dL 8.9   < > 8.9   ALBUMIN g/dL  --   --  4.3   TOTAL BILIRUBIN mg/dL  --   --  0.95   ALK PHOS U/L  --   --  111*   ALT U/L  --   --  21   AST U/L  --   --  19   GLUCOSE RANDOM mg/dL 154*   < > 375*    < > = values in this interval not displayed.         Results from last 7 days   Lab Units 09/22/24  0547 09/21/24  2355 09/21/24  2032 09/21/24  1601 09/21/24  1039 09/21/24  0859 09/21/24  0600 09/20/24  1612   POC GLUCOSE mg/dl 146* 115 108 162* 200* 217* 72 244*               Recent Cultures (last 7 days):         Imaging Review: Reviewed radiology reports from this admission including: CT abdomen/pelvis.    Last 24 Hours Medication List:     Current Facility-Administered Medications:     ALPRAZolam (XANAX) tablet 0.5 mg, BID PRN    clozapine (CLOZARIL) tablet 200 mg, HS    insulin glargine (LANTUS) subcutaneous injection 32 Units 0.32 mL, HS    insulin lispro (HumALOG/ADMELOG) 100 units/mL subcutaneous injection 1-5 Units, TID AC **AND** Fingerstick Glucose (POCT), TID AC    insulin lispro (HumALOG/ADMELOG) 100 units/mL subcutaneous injection 3 Units, TID With Meals    ondansetron (ZOFRAN) injection 4 mg, Q6H PRN    pantoprazole (PROTONIX) EC tablet 40 mg, Daily    sertraline (ZOLOFT) tablet 100 mg, Daily      **Please Note: This note may have been constructed using a voice recognition system.**

## 2024-09-22 NOTE — ASSESSMENT & PLAN NOTE
History of cirrhosis, reportedly medication induced.  Compensated with no encephalopathy  CT with no ascites.  No peripheral edema on exam.  Albumin normal at 4.3.  Outpatient GI follow-up

## 2024-09-22 NOTE — ASSESSMENT & PLAN NOTE
Lab Results   Component Value Date    HGBA1C 8.2 (H) 07/27/2024     With hyperglycemia on admission in the setting of acute infection/stress, resolved  Continue Lantus 32 units at night  DC standing dose humalog to avoid hypoglycemia  Continue sliding scale and adjust as needed

## 2024-09-22 NOTE — PLAN OF CARE
Problem: PAIN - ADULT  Goal: Verbalizes/displays adequate comfort level or baseline comfort level  Description: Interventions:  - Encourage patient to monitor pain and request assistance  - Assess pain using appropriate pain scale  - Administer analgesics based on type and severity of pain and evaluate response  - Implement non-pharmacological measures as appropriate and evaluate response  - Consider cultural and social influences on pain and pain management  - Notify physician/advanced practitioner if interventions unsuccessful or patient reports new pain  Outcome: Progressing     Problem: INFECTION - ADULT  Goal: Absence or prevention of progression during hospitalization  Description: INTERVENTIONS:  - Assess and monitor for signs and symptoms of infection  - Monitor lab/diagnostic results  - Monitor all insertion sites, i.e. indwelling lines, tubes, and drains  - Monitor endotracheal if appropriate and nasal secretions for changes in amount and color  - [place] appropriate cooling/warming therapies per order  - Administer medications as ordered  - Instruct and encourage patient and family to use good hand hygiene technique  - Identify and instruct in appropriate isolation precautions for identified infection/condition  Outcome: Progressing  Goal: Absence of fever/infection during neutropenic period  Description: INTERVENTIONS:  - Monitor WBC    Outcome: Progressing     Problem: SAFETY ADULT  Goal: Patient will remain free of falls  Description: INTERVENTIONS:  - Educate patient/family on patient safety including physical limitations  - Instruct patient to call for assistance with activity   - Consult OT/PT to assist with strengthening/mobility   - Keep Call bell within reach  - Keep bed low and locked with side rails adjusted as appropriate  - Keep care items and personal belongings within reach  - Initiate and maintain comfort rounds  - Make Fall Risk Sign visible to staff  - Offer Toileting every 2 Hours,  in advance of need  - Initiate/Maintain bed alarm  - Obtain necessary fall risk management equipment: bed alarm  - Apply yellow socks and bracelet for high fall risk patients  - Consider moving patient to room near nurses station  Outcome: Progressing  Goal: Maintain or return to baseline ADL function  Description: INTERVENTIONS:  -  Assess patient's ability to carry out ADLs; assess patient's baseline for ADL function and identify physical deficits which impact ability to perform ADLs (bathing, care of mouth/teeth, toileting, grooming, dressing, etc.)  - Assess/evaluate cause of self-care deficits   - Assess range of motion  - Assess patient's mobility; develop plan if impaired  - Assess patient's need for assistive devices and provide as appropriate  - Encourage maximum independence but intervene and supervise when necessary  - Involve family in performance of ADLs  - Assess for home care needs following discharge   - Consider OT consult to assist with ADL evaluation and planning for discharge  - Provide patient education as appropriate  Outcome: Progressing  Goal: Maintains/Returns to pre admission functional level  Description: INTERVENTIONS:  - Perform AM-PAC 6 Click Basic Mobility/ Daily Activity assessment daily.  - Set and communicate daily mobility goal to care team and patient/family/caregiver.   - Collaborate with rehabilitation services on mobility goals if consulted  - Perform Range of Motion 2 times a day.  - Reposition patient every 2 hours.  - Dangle patient 2 times a day  - Stand patient 2 times a day  - Ambulate patient 2 times a day  - Out of bed to chair 2 times a day   - Out of bed for meals 2 times a day  - Out of bed for toileting  - Record patient progress and toleration of activity level   Outcome: Progressing     Problem: DISCHARGE PLANNING  Goal: Discharge to home or other facility with appropriate resources  Description: INTERVENTIONS:  - Identify barriers to discharge w/patient and  caregiver  - Arrange for needed discharge resources and transportation as appropriate  - Identify discharge learning needs (meds, wound care, etc.)  - Arrange for interpretive services to assist at discharge as needed  - Refer to Case Management Department for coordinating discharge planning if the patient needs post-hospital services based on physician/advanced practitioner order or complex needs related to functional status, cognitive ability, or social support system  Outcome: Progressing     Problem: Knowledge Deficit  Goal: Patient/family/caregiver demonstrates understanding of disease process, treatment plan, medications, and discharge instructions  Description: Complete learning assessment and assess knowledge base.  Interventions:  - Provide teaching at level of understanding  - Provide teaching via preferred learning methods  Outcome: Progressing      Statement Selected

## 2024-09-22 NOTE — ASSESSMENT & PLAN NOTE
Mood stable.  Continue  Clozaril 200 mg daily, Xanax 0.5 mg twice daily as needed and Zoloft 100 mg daily

## 2024-09-22 NOTE — UTILIZATION REVIEW
Initial Clinical Review    Admission: Date/Time/Statement:   Admission Orders (From admission, onward)       Ordered        09/20/24 1325  INPATIENT ADMISSION  Once                          Orders Placed This Encounter   Procedures    INPATIENT ADMISSION     Standing Status:   Standing     Number of Occurrences:   1     Order Specific Question:   Level of Care     Answer:   Med Surg [16]     Order Specific Question:   Estimated length of stay     Answer:   More than 2 Midnights     Order Specific Question:   Certification     Answer:   I certify that inpatient services are medically necessary for this patient for a duration of greater than two midnights. See H&P and MD Progress Notes for additional information about the patient's course of treatment.     ED Arrival Information       Expected   -    Arrival   9/20/2024 12:00    Acuity   Urgent              Means of arrival   Ambulance    Escorted by   Self    Service   Hospitalist    Admission type   Emergency              Arrival complaint   abdominal pain             Chief Complaint   Patient presents with    Abdominal Pain     Reports mid abd pain for 2 days. Reports vomiting. Reports weakness. Reports diarrhea. Denies fevers       Initial Presentation: 59 y.o. male presents to the ED from home with c/o abd pain, diarrhea (7 loose, watery stools mixed with solidyesterday and x 5 today w/o blood), subjective fever x 1 day.  PMH: liver cirrhosis, anxiety, IDDM  in the ED he was tachycardic, had severe hyperglycemia.  Labs - glucose 375, elevated anion gap, BUN/Cr, low platelets. Imaging - mild diffuse ileus or enteritis, no SBO, Diffuse hepatic steatosis and mild nonspecific splenomegaly. Poss portal  HTN.  Treated with IV fluids x 3 L, Humulin insulin.  On exam large globular abd, soft, dull to percussion, A&O x 3.  Admitted to INPATIENT status with Diarrhea presumed infectious origin - viral vs bacterial entocolitis, IDDM, DENZEL - check stool studies, clear liquid  diet and advance as mayra,  CT abd, SSI cover, Lantus 32 u , Humalog 5 u AC, holding further fluids, trend BMP, CT abd, continue home antianxiety meds.      Date: 9/21   Day 2:  Diarrhea presumed infectious origin - viral vs bacterial entocolitis, IDDM, DENZEL  - no further stools so no stool sample. Tolerating diet, can advance to solid diet today, continue Tuojeo, receiving additional 500 cc fluid bolus today.  No further deficits on exam.  Continue trending labs.  BUN/Cr trending downward.     Date: 9/22  Day 3: Has surpassed a 2nd midnight with active treatments and services.  Diarrhea presumed infectious origin - viral vs bacterial entocolitis, IDDM, DENZEL - had episode of hypoglycemia overnight, snack given with improvement. Adjustment to Humalog insulin dosing.  IV fluids restarted d/t elevated creat.  Diarrhea resolved.  D/c standing dose Humalog insulin to avoid hypoglycemia. Anticipate d/c home on 9/23.        ED Triage Vitals   Temperature Pulse Respirations Blood Pressure SpO2 Pain Score   09/20/24 1208 09/20/24 1208 09/20/24 1208 09/20/24 1208 09/20/24 1208 09/20/24 1345   99.6 °F (37.6 °C) (!) 112 20 129/80 96 % 3     Weight (last 2 days)       Date/Time Weight    09/20/24 1345 94.1 (207.45)    09/20/24 1208 94.1 (207.45)            Vital Signs (last 3 days)       Date/Time Temp Pulse Resp BP MAP (mmHg) SpO2 O2 Device Patient Position - Orthostatic VS Shilpa Coma Scale Score Pain    09/22/24 0931 -- -- -- -- -- -- -- -- 15 No Pain    09/22/24 0725 97.3 °F (36.3 °C) -- 17 112/85 90 94 % None (Room air) Lying -- --    09/21/24 2303 98.6 °F (37 °C) 91 18 102/67 72 95 % None (Room air) Lying -- --    09/21/24 2100 -- -- -- -- -- -- -- -- 15 No Pain    09/21/24 1524 97.5 °F (36.4 °C) 89 18 118/84 89 96 % None (Room air) Lying -- --    09/21/24 1037 -- -- -- -- -- -- -- -- 15 No Pain    09/21/24 0729 97.1 °F (36.2 °C) 94 18 97/78 -- 96 % None (Room air) Lying -- --    09/20/24 2230 98.9 °F (37.2 °C) 103 18 106/68  78 93 % None (Room air) Lying -- --    09/20/24 2100 -- -- -- -- -- -- -- -- -- No Pain    09/20/24 1345 97.5 °F (36.4 °C) 98 20 128/80 -- -- None (Room air) -- -- 3    09/20/24 1239 -- -- -- -- -- -- -- -- 15 --    09/20/24 1208 99.6 °F (37.6 °C) 112 20 129/80 -- 96 % None (Room air) -- -- --              Pertinent Labs/Diagnostic Test Results:   Radiology:  CT abdomen pelvis wo contrast   Final Interpretation by Bob Marquez MD (09/20 1626)      1.  Mild diffuse small bowel dilation. This appearance is consistent with a mild diffuse ileus or enteritis. No evidence of a bowel obstruction.   2.  Diffuse hepatic steatosis and mild nonspecific splenomegaly. The associated recanalization of the paraumbilical vein, raising the concern for potential portal hypertension. Follow-up nonemergent evaluation by ultrasound elastography of the liver    should be considered.   3.  The study was marked in EPIC for significant notification..      Workstation performed: [id number]           Cardiology:  No orders to display     GI:  No orders to display           Results from last 7 days   Lab Units 09/22/24  0525 09/21/24  0555 09/20/24  1230   WBC Thousand/uL 6.11 5.28 8.11   HEMOGLOBIN g/dL 14.0 14.7 15.5   HEMATOCRIT % 43.1 41.9 46.1   PLATELETS Thousands/uL 117* 121* 126*   TOTAL NEUT ABS Thousands/µL 3.88  --  6.50         Results from last 7 days   Lab Units 09/22/24  0525 09/21/24  0555 09/20/24  1230   SODIUM mmol/L 141 143 134*   POTASSIUM mmol/L 3.6 3.4* 3.8   CHLORIDE mmol/L 107 108 98   CO2 mmol/L 24 23 22   ANION GAP mmol/L 10 12 14*   BUN mg/dL 25 27* 31*   CREATININE mg/dL 1.60* 1.66* 2.23*   EGFR ml/min/1.73sq m 46 44 31   CALCIUM mg/dL 8.9 8.6 8.9     Results from last 7 days   Lab Units 09/20/24  1230   AST U/L 19   ALT U/L 21   ALK PHOS U/L 111*   TOTAL PROTEIN g/dL 7.4   ALBUMIN g/dL 4.3   TOTAL BILIRUBIN mg/dL 0.95   BILIRUBIN DIRECT mg/dL 0.17   AMMONIA umol/L 27     Results from last 7 days   Lab  Units 09/22/24  0547 09/21/24  2355 09/21/24  2032 09/21/24  1601 09/21/24  1039 09/21/24  0859 09/21/24  0600 09/20/24  1612   POC GLUCOSE mg/dl 146* 115 108 162* 200* 217* 72 244*     Results from last 7 days   Lab Units 09/22/24  0525 09/21/24  0555 09/20/24  1230   GLUCOSE RANDOM mg/dL 154* 66 375*             Beta- Hydroxybutyrate   Date Value Ref Range Status   09/20/2024 0.24 0.02 - 0.27 mmol/L Final      Results from last 7 days   Lab Units 09/20/24  1230   LIPASE u/L <6*     ED Treatment-Medication Administration from 09/20/2024 1200 to 09/20/2024 1359         Date/Time Order Dose Route Action     09/20/2024 1238 sodium chloride 0.9 % bolus 1,000 mL 1,000 mL Intravenous New Bag     09/20/2024 1342 sodium chloride 0.9 % bolus 2,000 mL 1,000 mL Intravenous New Bag     09/20/2024 1337 insulin regular (HumuLIN R,NovoLIN R) injection 5 Units 5 Units Intravenous Given            Past Medical History:   Diagnosis Date    Anemia     Bipolar disorder (HCC)     Diabetes mellitus (HCC)     Diabetes mellitus, type II (HCC)     Gout     PAT (paroxysmal atrial tachycardia)     Schizophrenic disorder (HCC)      Present on Admission:   Liver cirrhosis (HCC)      Admitting Diagnosis: Diarrhea [R19.7]  Abdominal pain [R10.9]  Nausea and vomiting [R11.2]  Generalized abdominal pain [R10.84]  Diabetic ketoacidosis without coma associated with type 2 diabetes mellitus (HCC) [E11.10]  Age/Sex: 59 y.o. male  Admission Orders:  Scheduled Medications:  clozapine, 200 mg, Oral, HS  insulin glargine, 32 Units, Subcutaneous, HS  insulin lispro, 1-5 Units, Subcutaneous, TID AC  pantoprazole, 40 mg, Oral, Daily  sertraline, 100 mg, Oral, Daily      Continuous IV Infusions:  multi-electrolyte, 100 mL/hr, Intravenous, Continuous      PRN Meds:  ALPRAZolam, 0.5 mg, Oral, BID PRN  ondansetron, 4 mg, Intravenous, Q6H PRN - x 1 9/21    IV fluids  POC GLUCOSE AC/HS WITH SSI COVERAGE   OOB  ADA diet       Network Utilization Review  Department  ATTENTION: Please call with any questions or concerns to [phone] and carefully listen to the prompts so that you are directed to the right person. All voicemails are confidential.   For Discharge needs, contact Care Management DC Support Team at [phone] opt. [phone]  Send all requests for admission clinical reviews, approved or denied determinations and any other requests to dedicated fax number below belonging to the campus where the patient is receiving treatment. List of dedicated fax numbers for the Facilities:  FACILITY NAME UR FAX NUMBER   ADMISSION DENIALS (Administrative/Medical Necessity) [phone]   DISCHARGE SUPPORT TEAM (NETWORK) [phone]   PARENT CHILD HEALTH (Maternity/NICU/Pediatrics) [phone]   [facility] [phone]   [facility] [phone]   [facility] [phone]   [facility] [phone]   [facility] [phone]   [facility] [phone]   [facility] [phone]   [facility] [phone]   [facility] [phone]   [facility] [phone]   [facility] [phone]   [facility] [phone]

## 2024-09-22 NOTE — PLAN OF CARE
Problem: INFECTION - ADULT  Goal: Absence or prevention of progression during hospitalization  Description: INTERVENTIONS:  - Assess and monitor for signs and symptoms of infection  - Monitor lab/diagnostic results  - Monitor all insertion sites, i.e. indwelling lines, tubes, and drains  - Monitor endotracheal if appropriate and nasal secretions for changes in amount and color  - [place] appropriate cooling/warming therapies per order  - Administer medications as ordered  - Instruct and encourage patient and family to use good hand hygiene technique  - Identify and instruct in appropriate isolation precautions for identified infection/condition  9/22/2024 0324 by Ines Fontana  Outcome: Progressing  9/22/2024 0324 by Ines Fontana  Outcome: Progressing  Goal: Absence of fever/infection during neutropenic period  Description: INTERVENTIONS:  - Monitor WBC    9/22/2024 0324 by Ines Fontana  Outcome: Progressing  9/22/2024 0324 by Ines Fontana  Outcome: Progressing     Problem: SAFETY ADULT  Goal: Patient will remain free of falls  Description: INTERVENTIONS:  - Educate patient/family on patient safety including physical limitations  - Instruct patient to call for assistance with activity   - Consult OT/PT to assist with strengthening/mobility   - Keep Call bell within reach  - Keep bed low and locked with side rails adjusted as appropriate  - Keep care items and personal belongings within reach  - Initiate and maintain comfort rounds  - Make Fall Risk Sign visible to staff  - Apply yellow socks and bracelet for high fall risk patients  - Consider moving patient to room near nurses station  9/22/2024 0324 by Ines Fontana  Outcome: Progressing  9/22/2024 0324 by Ines Fontana  Outcome: Progressing  Goal: Maintain or return to baseline ADL function  Description: INTERVENTIONS:  -  Assess patient's ability to carry out ADLs; assess patient's baseline for ADL function and identify physical deficits which impact  ability to perform ADLs (bathing, care of mouth/teeth, toileting, grooming, dressing, etc.)  - Assess/evaluate cause of self-care deficits   - Assess range of motion  - Assess patient's mobility; develop plan if impaired  - Assess patient's need for assistive devices and provide as appropriate  - Encourage maximum independence but intervene and supervise when necessary  - Involve family in performance of ADLs  - Assess for home care needs following discharge   - Consider OT consult to assist with ADL evaluation and planning for discharge  - Provide patient education as appropriate  9/22/2024 0324 by Ines Fontana  Outcome: Progressing  9/22/2024 0324 by Ines Fontana  Outcome: Progressing  Goal: Maintains/Returns to pre admission functional level  Description: INTERVENTIONS:  - Perform AM-PAC 6 Click Basic Mobility/ Daily Activity assessment daily.  - Set and communicate daily mobility goal to care team and patient/family/caregiver.   - Collaborate with rehabilitation services on mobility goals if consulted  - Out of bed for toileting  - Record patient progress and toleration of activity level   9/22/2024 0324 by Ines Fontana  Outcome: Progressing  9/22/2024 0324 by Ines Fontana  Outcome: Progressing     Problem: DISCHARGE PLANNING  Goal: Discharge to home or other facility with appropriate resources  Description: INTERVENTIONS:  - Identify barriers to discharge w/patient and caregiver  - Arrange for needed discharge resources and transportation as appropriate  - Identify discharge learning needs (meds, wound care, etc.)  - Arrange for interpretive services to assist at discharge as needed  - Refer to Case Management Department for coordinating discharge planning if the patient needs post-hospital services based on physician/advanced practitioner order or complex needs related to functional status, cognitive ability, or social support system  9/22/2024 0324 by Ines Fontana  Outcome: Progressing  9/22/2024 0324 by  Ines Fontana  Outcome: Progressing     Problem: Knowledge Deficit  Goal: Patient/family/caregiver demonstrates understanding of disease process, treatment plan, medications, and discharge instructions  Description: Complete learning assessment and assess knowledge base.  Interventions:  - Provide teaching at level of understanding  - Provide teaching via preferred learning methods  9/22/2024 0324 by Ines Fontana  Outcome: Progressing  9/22/2024 0324 by Ines Fontana  Outcome: Progressing

## 2024-09-22 NOTE — ASSESSMENT & PLAN NOTE
Likely viral.  Low suspicion for C. difficile or bacterial etiology.  He has had no further stools and there is no stool sample to check for C. difficile or bacterial panel.  CT abdomen showed mild diffuse enteritis/ileus  Diarrhea resolved  Continue diabetic diet

## 2024-09-23 ENCOUNTER — APPOINTMENT (INPATIENT)
Dept: ULTRASOUND IMAGING | Facility: HOSPITAL | Age: 59
DRG: 392 | End: 2024-09-23
Payer: MEDICARE

## 2024-09-23 LAB
ALBUMIN SERPL BCG-MCNC: 3.6 G/DL (ref 3.5–5)
ALP SERPL-CCNC: 88 U/L (ref 34–104)
ALT SERPL W P-5'-P-CCNC: 17 U/L (ref 7–52)
ANION GAP SERPL CALCULATED.3IONS-SCNC: 9 MMOL/L (ref 4–13)
AST SERPL W P-5'-P-CCNC: 19 U/L (ref 13–39)
BILIRUB DIRECT SERPL-MCNC: 0.2 MG/DL (ref 0–0.2)
BILIRUB SERPL-MCNC: 0.66 MG/DL (ref 0.2–1)
BUN SERPL-MCNC: 21 MG/DL (ref 5–25)
CALCIUM SERPL-MCNC: 8.7 MG/DL (ref 8.4–10.2)
CHLORIDE SERPL-SCNC: 107 MMOL/L (ref 96–108)
CO2 SERPL-SCNC: 25 MMOL/L (ref 21–32)
CREAT SERPL-MCNC: 1.33 MG/DL (ref 0.6–1.3)
GFR SERPL CREATININE-BSD FRML MDRD: 58 ML/MIN/1.73SQ M
GLUCOSE SERPL-MCNC: 152 MG/DL (ref 65–140)
GLUCOSE SERPL-MCNC: 163 MG/DL (ref 65–140)
GLUCOSE SERPL-MCNC: 166 MG/DL (ref 65–140)
GLUCOSE SERPL-MCNC: 167 MG/DL (ref 65–140)
GLUCOSE SERPL-MCNC: 218 MG/DL (ref 65–140)
INR PPP: 0.98 (ref 0.85–1.19)
MAGNESIUM SERPL-MCNC: 1.9 MG/DL (ref 1.9–2.7)
PHOSPHATE SERPL-MCNC: 2.7 MG/DL (ref 2.7–4.5)
POTASSIUM SERPL-SCNC: 3.6 MMOL/L (ref 3.5–5.3)
PROT SERPL-MCNC: 6.1 G/DL (ref 6.4–8.4)
PROTHROMBIN TIME: 13.3 SECONDS (ref 12.3–15)
SODIUM SERPL-SCNC: 141 MMOL/L (ref 135–147)

## 2024-09-23 PROCEDURE — 80076 HEPATIC FUNCTION PANEL: CPT | Performed by: DIETITIAN, REGISTERED

## 2024-09-23 PROCEDURE — 76705 ECHO EXAM OF ABDOMEN: CPT

## 2024-09-23 PROCEDURE — 85610 PROTHROMBIN TIME: CPT | Performed by: DIETITIAN, REGISTERED

## 2024-09-23 PROCEDURE — 84100 ASSAY OF PHOSPHORUS: CPT | Performed by: NURSE PRACTITIONER

## 2024-09-23 PROCEDURE — 82948 REAGENT STRIP/BLOOD GLUCOSE: CPT

## 2024-09-23 PROCEDURE — 83735 ASSAY OF MAGNESIUM: CPT | Performed by: NURSE PRACTITIONER

## 2024-09-23 PROCEDURE — 99232 SBSQ HOSP IP/OBS MODERATE 35: CPT | Performed by: NURSE PRACTITIONER

## 2024-09-23 PROCEDURE — 80048 BASIC METABOLIC PNL TOTAL CA: CPT | Performed by: INTERNAL MEDICINE

## 2024-09-23 PROCEDURE — 99222 1ST HOSP IP/OBS MODERATE 55: CPT | Performed by: STUDENT IN AN ORGANIZED HEALTH CARE EDUCATION/TRAINING PROGRAM

## 2024-09-23 RX ORDER — SIMETHICONE 80 MG
80 TABLET,CHEWABLE ORAL EVERY 6 HOURS PRN
Status: DISCONTINUED | OUTPATIENT
Start: 2024-09-23 | End: 2024-09-24

## 2024-09-23 RX ORDER — SENNOSIDES 8.6 MG
2 TABLET ORAL
Status: DISCONTINUED | OUTPATIENT
Start: 2024-09-23 | End: 2024-09-24

## 2024-09-23 RX ORDER — SODIUM CHLORIDE, SODIUM GLUCONATE, SODIUM ACETATE, POTASSIUM CHLORIDE, MAGNESIUM CHLORIDE, SODIUM PHOSPHATE, DIBASIC, AND POTASSIUM PHOSPHATE .53; .5; .37; .037; .03; .012; .00082 G/100ML; G/100ML; G/100ML; G/100ML; G/100ML; G/100ML; G/100ML
50 INJECTION, SOLUTION INTRAVENOUS CONTINUOUS
Status: DISCONTINUED | OUTPATIENT
Start: 2024-09-23 | End: 2024-09-24

## 2024-09-23 RX ORDER — DICYCLOMINE HCL 20 MG
20 TABLET ORAL
Status: DISCONTINUED | OUTPATIENT
Start: 2024-09-23 | End: 2024-09-25 | Stop reason: HOSPADM

## 2024-09-23 RX ORDER — ACETAMINOPHEN 325 MG/1
650 TABLET ORAL EVERY 6 HOURS PRN
Status: DISCONTINUED | OUTPATIENT
Start: 2024-09-23 | End: 2024-09-25 | Stop reason: HOSPADM

## 2024-09-23 RX ORDER — POTASSIUM CHLORIDE 20MEQ/15ML
20 LIQUID (ML) ORAL ONCE
Status: COMPLETED | OUTPATIENT
Start: 2024-09-23 | End: 2024-09-23

## 2024-09-23 RX ADMIN — PANTOPRAZOLE SODIUM 40 MG: 40 TABLET, DELAYED RELEASE ORAL at 08:16

## 2024-09-23 RX ADMIN — DICYCLOMINE HYDROCHLORIDE 20 MG: 20 TABLET ORAL at 16:10

## 2024-09-23 RX ADMIN — SODIUM CHLORIDE, SODIUM GLUCONATE, SODIUM ACETATE, POTASSIUM CHLORIDE, MAGNESIUM CHLORIDE, SODIUM PHOSPHATE, DIBASIC, AND POTASSIUM PHOSPHATE 50 ML/HR: .53; .5; .37; .037; .03; .012; .00082 INJECTION, SOLUTION INTRAVENOUS at 10:40

## 2024-09-23 RX ADMIN — POTASSIUM CHLORIDE 20 MEQ: 1.5 SOLUTION ORAL at 10:40

## 2024-09-23 RX ADMIN — INSULIN LISPRO 1 UNITS: 100 INJECTION, SOLUTION INTRAVENOUS; SUBCUTANEOUS at 16:10

## 2024-09-23 RX ADMIN — DICYCLOMINE HYDROCHLORIDE 20 MG: 20 TABLET ORAL at 12:07

## 2024-09-23 RX ADMIN — CLOZAPINE 200 MG: 200 TABLET ORAL at 22:30

## 2024-09-23 RX ADMIN — INSULIN GLARGINE 32 UNITS: 100 INJECTION, SOLUTION SUBCUTANEOUS at 22:30

## 2024-09-23 RX ADMIN — INSULIN LISPRO 1 UNITS: 100 INJECTION, SOLUTION INTRAVENOUS; SUBCUTANEOUS at 10:54

## 2024-09-23 RX ADMIN — INSULIN LISPRO 1 UNITS: 100 INJECTION, SOLUTION INTRAVENOUS; SUBCUTANEOUS at 08:16

## 2024-09-23 RX ADMIN — SERTRALINE HYDROCHLORIDE 100 MG: 100 TABLET ORAL at 08:16

## 2024-09-23 NOTE — PLAN OF CARE
Problem: PAIN - ADULT  Goal: Verbalizes/displays adequate comfort level or baseline comfort level  Description: Interventions:  - Encourage patient to monitor pain and request assistance  - Assess pain using appropriate pain scale  - Administer analgesics based on type and severity of pain and evaluate response  - Implement non-pharmacological measures as appropriate and evaluate response  - Consider cultural and social influences on pain and pain management  - Notify physician/advanced practitioner if interventions unsuccessful or patient reports new pain  Outcome: Progressing     Problem: INFECTION - ADULT  Goal: Absence or prevention of progression during hospitalization  Description: INTERVENTIONS:  - Assess and monitor for signs and symptoms of infection  - Monitor lab/diagnostic results  - Monitor all insertion sites, i.e. indwelling lines, tubes, and drains  - Monitor endotracheal if appropriate and nasal secretions for changes in amount and color  - [place] appropriate cooling/warming therapies per order  - Administer medications as ordered  - Instruct and encourage patient and family to use good hand hygiene technique  - Identify and instruct in appropriate isolation precautions for identified infection/condition  Outcome: Progressing  Goal: Absence of fever/infection during neutropenic period  Description: INTERVENTIONS:  - Monitor WBC    Outcome: Progressing     Problem: SAFETY ADULT  Goal: Patient will remain free of falls  Description: INTERVENTIONS:  - Educate patient/family on patient safety including physical limitations  - Instruct patient to call for assistance with activity   - Consult OT/PT to assist with strengthening/mobility   - Keep Call bell within reach  - Keep bed low and locked with side rails adjusted as appropriate  - Keep care items and personal belongings within reach  - Initiate and maintain comfort rounds  - Make Fall Risk Sign visible to staff  - Apply yellow socks and bracelet  Mary Rivera is a 86 year old female presenting for   Chief Complaint   Patient presents with   • Consultation     Lichen Sclerosus   Patient is using clobetasol twice weekly x May  She was using clobetasol 1x every 3 weeks but symptoms returned.   She has incontinence and applies aquaphor prn for skin irritation  Denies known Latex allergy or symptoms of Latex sensitivity.  Medications reviewed and updated.  Birth control:post menopausal  No LMP recorded. Patient is postmenopausal.  Refills needed today: No  Last pap smear: n/a  Any history of abnormal pap smears: No  Any history of sexually transmitted diseases:  No  Last Mammogram: 11/16/18  Last Colonoscopy: 2014  Tetanus Vaccine reviewed and N/A   Reviewed overdue health maintenance topics with patient.  Health Maintenance Due   Topic Date Due   • Medicare Wellness 65+  09/25/2019   • Shingles Vaccine (2 of 2) 12/17/2019   • Depression Screening  09/27/2020            for high fall risk patients  - Consider moving patient to room near nurses station  Outcome: Progressing  Goal: Maintain or return to baseline ADL function  Description: INTERVENTIONS:  -  Assess patient's ability to carry out ADLs; assess patient's baseline for ADL function and identify physical deficits which impact ability to perform ADLs (bathing, care of mouth/teeth, toileting, grooming, dressing, etc.)  - Assess/evaluate cause of self-care deficits   - Assess range of motion  - Assess patient's mobility; develop plan if impaired  - Assess patient's need for assistive devices and provide as appropriate  - Encourage maximum independence but intervene and supervise when necessary  - Involve family in performance of ADLs  - Assess for home care needs following discharge   - Consider OT consult to assist with ADL evaluation and planning for discharge  - Provide patient education as appropriate  Outcome: Progressing  Goal: Maintains/Returns to pre admission functional level  Description: INTERVENTIONS:  - Perform AM-PAC 6 Click Basic Mobility/ Daily Activity assessment daily.  - Set and communicate daily mobility goal to care team and patient/family/caregiver.   - Collaborate with rehabilitation services on mobility goals if consulted  - Out of bed for toileting  - Record patient progress and toleration of activity level   Outcome: Progressing     Problem: DISCHARGE PLANNING  Goal: Discharge to home or other facility with appropriate resources  Description: INTERVENTIONS:  - Identify barriers to discharge w/patient and caregiver  - Arrange for needed discharge resources and transportation as appropriate  - Identify discharge learning needs (meds, wound care, etc.)  - Arrange for interpretive services to assist at discharge as needed  - Refer to Case Management Department for coordinating discharge planning if the patient needs post-hospital services based on physician/advanced practitioner order or complex needs  related to functional status, cognitive ability, or social support system  Outcome: Progressing     Problem: Knowledge Deficit  Goal: Patient/family/caregiver demonstrates understanding of disease process, treatment plan, medications, and discharge instructions  Description: Complete learning assessment and assess knowledge base.  Interventions:  - Provide teaching at level of understanding  - Provide teaching via preferred learning methods  Outcome: Progressing

## 2024-09-23 NOTE — PLAN OF CARE
Problem: METABOLIC, FLUID AND ELECTROLYTES - ADULT  Goal: Electrolytes maintained within normal limits  Description: INTERVENTIONS:  - Monitor labs and assess patient for signs and symptoms of electrolyte imbalances  - Administer electrolyte replacement as ordered  - Monitor response to electrolyte replacements, including repeat lab results as appropriate  - Instruct patient on fluid and nutrition as appropriate  Outcome: Progressing  Goal: Fluid balance maintained  Description: INTERVENTIONS:  - Monitor labs   - Monitor I/O and WT  - Instruct patient on fluid and nutrition as appropriate  - Assess for signs & symptoms of volume excess or deficit  Outcome: Progressing  Goal: Glucose maintained within target range  Description: INTERVENTIONS:  - Monitor Blood Glucose as ordered  - Assess for signs and symptoms of hyperglycemia and hypoglycemia  - Administer ordered medications to maintain glucose within target range  - Assess nutritional intake and initiate nutrition service referral as needed  Outcome: Progressing     Problem: GASTROINTESTINAL - ADULT  Goal: Maintains or returns to baseline bowel function  Description: INTERVENTIONS:  - Assess bowel function  - Encourage oral fluids to ensure adequate hydration  - Administer IV fluids if ordered to ensure adequate hydration  - Administer ordered medications as needed  - Encourage mobilization and activity  - Consider nutritional services referral to assist patient with adequate nutrition and appropriate food choices  Outcome: Progressing     Problem: INFECTION - ADULT  Goal: Absence or prevention of progression during hospitalization  Description: INTERVENTIONS:  - Assess and monitor for signs and symptoms of infection  - Monitor lab/diagnostic results  - Monitor all insertion sites, i.e. indwelling lines, tubes, and drains  - Monitor endotracheal if appropriate and nasal secretions for changes in amount and color  - [place] appropriate cooling/warming therapies  per order  - Administer medications as ordered  - Instruct and encourage patient and family to use good hand hygiene technique  - Identify and instruct in appropriate isolation precautions for identified infection/condition  Outcome: Progressing     Problem: Knowledge Deficit  Goal: Patient/family/caregiver demonstrates understanding of disease process, treatment plan, medications, and discharge instructions  Description: Complete learning assessment and assess knowledge base.  Interventions:  - Provide teaching at level of understanding  - Provide teaching via preferred learning methods  Outcome: Progressing     Problem: DISCHARGE PLANNING  Goal: Discharge to home or other facility with appropriate resources  Description: INTERVENTIONS:  - Identify barriers to discharge w/patient and caregiver  - Arrange for needed discharge resources and transportation as appropriate  - Identify discharge learning needs (meds, wound care, etc.)  - Arrange for interpretive services to assist at discharge as needed  - Refer to Case Management Department for coordinating discharge planning if the patient needs post-hospital services based on physician/advanced practitioner order or complex needs related to functional status, cognitive ability, or social support system  Outcome: Progressing

## 2024-09-23 NOTE — PROGRESS NOTES
Progress Note - Hospitalist   Name: Sg Kaplan 59 y.o. male I MRN: [id number]  Unit/Bed#: 7T [facility] 706-02 I Date of Admission: 9/20/2024   Date of Service: 9/23/2024 I Hospital Day: 3    Assessment & Plan  Acute kidney injury (HCC)  Due to prerenal azotemia from diarrhea and GI loss on top of cirrhosis  Creatinine 2.23 on admission, baseline 1.2-1.3  Creatinine improved to 1.33  Appreciate poor  Will resume gentle IVF  Diarrhea of presumed infectious origin  Likely viral.  Low suspicion for C. difficile or bacterial etiology.  CT abdomen showed mild diffuse enteritis/ileus  Diarrhea resolved but did have an episode last night   Continue diabetic diet   GI consult  Type 2 diabetes mellitus with hyperglycemia, with long-term current use of insulin ([facility])  Lab Results   Component Value Date    HGBA1C 8.2 (H) 07/27/2024     With hyperglycemia on admission in the setting of acute infection/stress, resolved  Continue Lantus 32 units at night  DC standing dose humalog to avoid hypoglycemia  Continue sliding scale and adjust as needed  Liver cirrhosis (HCC)  History of cirrhosis, reportedly medication induced.  Compensated with no encephalopathy  CT with no ascites.  No peripheral edema on exam.  Albumin normal at 4.3.  Now with abdominal distention and tenderness  Obtain abdominal US and consult GI  Anxiety  Mood stable.  Continue Clozaril 200 mg daily, Xanax 0.5 mg twice daily as needed and Zoloft 100 mg daily  Thrombocytopenia (HCC)  Stable, no signs of bleeding or bruising   Likely due to cirrhosis  Monitor     VTE Pharmacologic Prophylaxis: VTE Score: 2 Moderate Risk (Score 3-4) - Pharmacological DVT Prophylaxis Contraindicated. Sequential Compression Devices Ordered.    Mobility:   Basic Mobility Inpatient Raw Score: 23  JH-HLM Goal: 7: Walk 25 feet or more  JH-HLM Achieved: 7: Walk 25 feet or more  JH-HLM Goal achieved. Continue to encourage appropriate mobility.    Patient Centered Rounds: I performed bedside rounds  with nursing staff today.   Discussions with Specialists or Other Care Team Provider: nursing, CM, GI    Education and Discussions with Family / Patient: Patient declined call to . States he will keep his parents updated     Current Length of Stay: 3 day(s)  Current Patient Status: Inpatient   Certification Statement: The patient will continue to require additional inpatient hospital stay due to abdominal pain, diarrhea, decreased appetite   Discharge Plan: Anticipate discharge tomorrow to home.    Code Status: Level 1 - Full Code    Subjective   Patient seen and examined at bedside. He reports diarrhea last night, epigastric tenderness today, and limited appetite. Ate 25-50% of [place] toast. States he doesn't feel well enough for discharge home and would like to spend another night. Denies HA, dizziness, CP, SOB, or vomiting.     Objective     Vitals:   Temp (24hrs), Av.7 °F (36.5 °C), Min:97.6 °F (36.4 °C), Max:97.8 °F (36.6 °C)    Temp:  [97.6 °F (36.4 °C)-97.8 °F (36.6 °C)] 97.8 °F (36.6 °C)  HR:  [81-86] 86  Resp:  [16-18] 16  BP: (109-112)/(72-78) 111/78  SpO2:  [95 %-96 %] 95 %  Body mass index is 28.13 kg/m².     Input and Output Summary (last 24 hours):     Intake/Output Summary (Last 24 hours) at 2024 1034  Last data filed at 2024 0856  Gross per 24 hour   Intake 960 ml   Output --   Net 960 ml       Physical Exam  Vitals and nursing note reviewed.   Constitutional:       General: He is not in acute distress.     Appearance: He is well-developed. He is not diaphoretic.      Comments: Pleasant, talkative gentleman resting in bed on room air    HENT:      Head: Normocephalic.   Cardiovascular:      Rate and Rhythm: Normal rate.   Pulmonary:      Effort: Pulmonary effort is normal. No respiratory distress.      Breath sounds: Normal breath sounds. No wheezing, rhonchi or rales.   Abdominal:      General: Bowel sounds are normal. There is distension (soft).      Palpations: Abdomen  is soft.      Tenderness: There is abdominal tenderness. There is no guarding or rebound.   Musculoskeletal:         General: No tenderness or edema. Normal range of motion.      Cervical back: Normal range of motion.      Right lower leg: No edema.      Left lower leg: No edema.   Skin:     General: Skin is warm and dry.      Findings: No rash.   Neurological:      Mental Status: He is alert and oriented to person, place, and time.      Deep Tendon Reflexes: Reflexes are normal and symmetric.   Psychiatric:         Mood and Affect: Mood and affect and mood normal.         Behavior: Behavior normal.         Judgment: Judgment normal.          Lines/Drains:  Lines/Drains/Airways       Active Status       None                            Lab Results: I have reviewed the following results:    Results from last 7 days   Lab Units 09/22/24  0525   WBC Thousand/uL 6.11   HEMOGLOBIN g/dL 14.0   HEMATOCRIT % 43.1   PLATELETS Thousands/uL 117*   SEGS PCT % 63   LYMPHO PCT % 20   MONO PCT % 13*   EOS PCT % 3     Results from last 7 days   Lab Units 09/23/24  0439 09/21/24  0555 09/20/24  1230   SODIUM mmol/L 141   < > 134*   POTASSIUM mmol/L 3.6   < > 3.8   CHLORIDE mmol/L 107   < > 98   CO2 mmol/L 25   < > 22   BUN mg/dL 21   < > 31*   CREATININE mg/dL 1.33*   < > 2.23*   ANION GAP mmol/L 9   < > 14*   CALCIUM mg/dL 8.7   < > 8.9   ALBUMIN g/dL  --   --  4.3   TOTAL BILIRUBIN mg/dL  --   --  0.95   ALK PHOS U/L  --   --  111*   ALT U/L  --   --  21   AST U/L  --   --  19   GLUCOSE RANDOM mg/dL 167*   < > 375*    < > = values in this interval not displayed.         Results from last 7 days   Lab Units 09/23/24  0534 09/22/24  2042 09/22/24  1525 09/22/24  1101 09/22/24  0547 09/21/24  2355 09/21/24  2032 09/21/24  1601 09/21/24  1039 09/21/24  0859 09/21/24  0600 09/20/24  1612   POC GLUCOSE mg/dl 163* 158* 107 181* 146* 115 108 162* 200* 217* 72 244*               Recent Cultures (last 7 days):         Imaging Review:  Reviewed radiology reports from this admission including: CT abdomen/pelvis.  Other Studies: No additional pertinent studies reviewed.    Last 24 Hours Medication List:     Current Facility-Administered Medications:     ALPRAZolam (XANAX) tablet 0.5 mg, BID PRN    clozapine (CLOZARIL) tablet 200 mg, HS    insulin glargine (LANTUS) subcutaneous injection 32 Units 0.32 mL, HS    insulin lispro (HumALOG/ADMELOG) 100 units/mL subcutaneous injection 1-5 Units, TID AC **AND** Fingerstick Glucose (POCT), TID AC    multi-electrolyte (PLASMALYTE-A/ISOLYTE-S PH 7.4) IV solution, Continuous    ondansetron (ZOFRAN) injection 4 mg, Q6H PRN    pantoprazole (PROTONIX) EC tablet 40 mg, Daily    potassium chloride oral solution 20 mEq, Once    sertraline (ZOLOFT) tablet 100 mg, Daily    Administrative Statements   Today, Patient Was Seen By: BARBARA Mckee  I have spent a total time of  minutes in caring for this patient on the day of the visit/encounter including Diagnostic results, Instructions for management, Patient and family education, Counseling / Coordination of care, Documenting in the medical record, Reviewing / ordering tests, medicine, procedures  , Obtaining or reviewing history  , and Communicating with other healthcare professionals .    **Please Note: This note may have been constructed using a voice recognition system.**

## 2024-09-23 NOTE — ASSESSMENT & PLAN NOTE
Due to prerenal azotemia from diarrhea and GI loss on top of cirrhosis  Creatinine 2.23 on admission, baseline 1.2-1.3  Creatinine improved to 1.33  Appreciate poor  Will resume gentle IVF

## 2024-09-23 NOTE — CONSULTS
Consultation -  [facility]  Sg Kaplan 59 y.o. male MRN: [id number]  Unit/Bed#: 7T [facility] 706-02 Encounter: [id number]        Inpatient consult to gastroenterology  Consult performed by: Farhad Velazquez PA-C  Consult ordered by: BARBARA Mckee      Reason for Consult / Principal Problem:     1.  Diarrhea  2.  Abdominal pain  3.  Cirrhosis    ASSESSMENT AND PLAN:    1.  Diarrhea  2.  Lower abdominal pain  3.  Cirrhosis  Patient originally presented with nausea/vomiting and diarrhea, which has overall improved throughout hospitalization but now with lower abdominal pain.  CT abdomen pelvis 9/20/2024 showed diffuse low-attenuation throughout the liver, moderate hepatomegaly, mild splenomegaly, mild diffuse dilation of fluid-filled small bowel loops without a focal transition point.  Patient reports a history of cirrhosis related to a past psychiatric medication.    -Suspect abdominal pain related to infectious gastroenteritis.  -Start dicyclomine 20 mg 3 times daily.  -Continue pantoprazole 40 mg once daily.  -Follow-up RUQ US results once available.  -Recommend patient's schedule follow-up office visit with GI.  Patient would benefit from US elastography, EGD to screen for esophageal varices, and screening colonoscopy.    GI will continue to follow    ______________________________________________________________________    HPI:  Sg Kaplan is a 59 y.o. male with past medical history including cirrhosis, diabetes, and anxiety who presented on 9/20/2024 with abdominal pain and diarrhea.  CT abdomen pelvis 9/20/2024 showed diffuse low-attenuation throughout the liver, moderate hepatomegaly, mild splenomegaly, mild diffuse dilation of fluid-filled small bowel loops without a focal transition point.  Patient also found to have DENZEL suspected due to prerenal azotemia from diarrhea and GI loss, with improvement of creatinine with IV fluids.  GI is now being consulted for worsening  abdominal pain/distention.  Patient is having RUQ US performed this morning.    Patient reports when he first presented to the hospital he was having significant vomiting and diarrhea, but this has overall significantly improved.  Nausea/vomiting has resolved.  Diarrhea had previously resolved but then had 1 watery bowel movement last night.  He reports at baseline he typically has a bowel movement every 2 days.  He reports chronic history of abdominal distention, which has not worsened recently.  He does report new onset of lower abdominal pain/tenderness.  He states his appetite is currently low and he has been experiencing some indigestion.    Patient reports a history of cirrhosis due to a past psychiatric medication, though he is not sure which one.  He thinks he had his last EGD/colonoscopy about 10 years ago.    BMP 9/23/2024 noted creatinine 1.33, glucose 167, otherwise normal.  CBC 9/22/2024 normal other than platelets 117.  Hepatic function panel 9/23/2024 normal other than total protein 6.1.    MELD 3.0: 9 at 9/23/2024 10:53 AM  MELD-Na: 9 at 9/23/2024 10:53 AM  Calculated from:  Serum Creatinine: 1.33 mg/dL at 9/23/2024  4:39 AM  Serum Sodium: 141 mmol/L (Using max of 137 mmol/L) at 9/23/2024  4:39 AM  Total Bilirubin: 0.66 mg/dL (Using min of 1 mg/dL) at 9/23/2024  4:39 AM  Serum Albumin: 3.6 g/dL (Using max of 3.5 g/dL) at 9/23/2024  4:39 AM  INR(ratio): 0.98 (Using min of 1) at 9/23/2024 10:53 AM  Age at listing (hypothetical): 59 years  Sex: Male at 9/23/2024 10:53 AM         REVIEW OF SYSTEMS:    CONSTITUTIONAL: Denies any fever, chills, rigors, and weight loss.  HEENT: No earache or tinnitus. Denies hearing loss or visual disturbances.  CARDIOVASCULAR: No chest pain or palpitations.   RESPIRATORY: Denies any cough, hemoptysis, shortness of breath or dyspnea on exertion.  GASTROINTESTINAL: As noted in the History of Present Illness.   GENITOURINARY: No problems with urination. Denies any hematuria  or dysuria.  NEUROLOGIC: No dizziness or vertigo, denies headaches.   MUSCULOSKELETAL: Denies any muscle or joint pain.   SKIN: Denies skin rashes or itching.   ENDOCRINE: Denies excessive thirst. Denies intolerance to heat or cold.  PSYCHOSOCIAL: Denies depression or anxiety. Denies any recent memory loss.       Historical Information   Past Medical History:   Diagnosis Date    Anemia     Bipolar disorder (HCC)     Diabetes mellitus (HCC)     Diabetes mellitus, type II (HCC)     Gout     PAT (paroxysmal atrial tachycardia)     Schizophrenic disorder (HCC)      History reviewed. No pertinent surgical history.  Social History   Social History     Substance and Sexual Activity   Alcohol Use Never     Social History     Substance and Sexual Activity   Drug Use No     Social History     Tobacco Use   Smoking Status Every Day   Smokeless Tobacco Never     Family History   Problem Relation Age of Onset    Coronary artery disease Mother         premature       Meds/Allergies       Medications Prior to Admission:     allopurinol (ZYLOPRIM) 100 mg tablet    ALPRAZolam (XANAX) 0.5 mg tablet    BD PEN NEEDLE TREV U/F 32G X 4 MM MISC    benzonatate (TESSALON) 200 MG capsule    benztropine (COGENTIN) 0.5 mg tablet    cloZAPine (CLOZARIL) 100 mg tablet    clozapine (CLOZARIL) 200 MG tablet    clozapine (CLOZARIL) 50 MG tablet    ergocalciferol (VITAMIN D2) 50,000 units    FLUoxetine (PROzac) 20 mg capsule    lithium 300 MG tablet    meloxicam (MOBIC) 7.5 mg tablet    metFORMIN (GLUCOPHAGE) 500 mg tablet    methylphenidate (RITALIN) 10 mg tablet    pantoprazole (PROTONIX) 40 mg tablet    RA MELATONIN 3 MG    RA Melatonin 3-2 MG TABS    sertraline (ZOLOFT) 50 mg tablet    sucralfate (CARAFATE) 1 g tablet    TOUJEO SOLOSTAR 300 units/mL CONCETRATED U-300 injection pen (1-unit dial)    citalopram (CeleXA) 20 mg tablet    naproxen (EC NAPROSYN) 500 MG EC tablet    venlafaxine (EFFEXOR-XR) 75 mg 24 hr capsule    Current  "Facility-Administered Medications:     ALPRAZolam (XANAX) tablet 0.5 mg, BID PRN    clozapine (CLOZARIL) tablet 200 mg, HS    insulin glargine (LANTUS) subcutaneous injection 32 Units 0.32 mL, HS    insulin lispro (HumALOG/ADMELOG) 100 units/mL subcutaneous injection 1-5 Units, TID AC **AND** Fingerstick Glucose (POCT), TID AC    ondansetron (ZOFRAN) injection 4 mg, Q6H PRN    pantoprazole (PROTONIX) EC tablet 40 mg, Daily    potassium chloride oral solution 20 mEq, Once    sertraline (ZOLOFT) tablet 100 mg, Daily    Allergies   Allergen Reactions    Bupropion     Chlorproethazine     Molindone     Paliperidone     Thiazide-Type Diuretics            Objective     Blood pressure 111/78, pulse 86, temperature 97.8 °F (36.6 °C), temperature source Temporal, resp. rate 16, height 6' 0.01\" (1.829 m), weight 94.1 kg (207 lb 7.3 oz), SpO2 95%. Body mass index is 28.13 kg/m².      Intake/Output Summary (Last 24 hours) at 9/23/2024 1008  Last data filed at 9/23/2024 0856  Gross per 24 hour   Intake 960 ml   Output --   Net 960 ml         PHYSICAL EXAM:      General Appearance:   Alert, cooperative, no distress   HEENT:   Normocephalic, atraumatic, anicteric.     Neck:  Supple, symmetrical, trachea midline   Lungs:   Clear to auscultation bilaterally; no rales, rhonchi or wheezing; respirations unlabored    Heart::   Regular rate and rhythm; no murmur, rub, or gallop.   Abdomen:   Soft, non-tender, distended; normal bowel sounds; no masses, no organomegaly    Genitalia:   Deferred    Rectal:   Deferred    Extremities:  No cyanosis, clubbing or edema    Pulses:  2+ and symmetric all extremities    Skin:  No jaundice, rashes, or lesions    Lymph nodes:  No palpable cervical lymphadenopathy        Lab Results:   Admission on 09/20/2024   Component Date Value    WBC 09/20/2024 8.11     RBC 09/20/2024 5.34     Hemoglobin 09/20/2024 15.5     Hematocrit 09/20/2024 46.1     MCV 09/20/2024 86     MCH 09/20/2024 29.0     [facility] " 09/20/2024 33.6     RDW 09/20/2024 13.3     MPV 09/20/2024 10.7     Platelets 09/20/2024 126 (L)     nRBC 09/20/2024 0     Segmented % 09/20/2024 80 (H)     Immature Grans % 09/20/2024 0     Lymphocytes % 09/20/2024 11 (L)     Monocytes % 09/20/2024 9     Eosinophils Relative 09/20/2024 0     Basophils Relative 09/20/2024 0     Absolute Neutrophils 09/20/2024 6.50     Absolute Immature Grans 09/20/2024 0.02     Absolute Lymphocytes 09/20/2024 0.87     Absolute Monocytes 09/20/2024 0.69     Eosinophils Absolute 09/20/2024 0.00     Basophils Absolute 09/20/2024 0.03     Lipase 09/20/2024 <6 (L)     Sodium 09/20/2024 134 (L)     Potassium 09/20/2024 3.8     Chloride 09/20/2024 98     CO2 09/20/2024 22     ANION GAP 09/20/2024 14 (H)     BUN 09/20/2024 31 (H)     Creatinine 09/20/2024 2.23 (H)     Glucose 09/20/2024 375 (H)     Calcium 09/20/2024 8.9     eGFR 09/20/2024 31     Total Bilirubin 09/20/2024 0.95     Bilirubin, Direct 09/20/2024 0.17     Alkaline Phosphatase 09/20/2024 111 (H)     AST 09/20/2024 19     ALT 09/20/2024 21     Total Protein 09/20/2024 7.4     Albumin 09/20/2024 4.3     Ammonia 09/20/2024 27     Beta- Hydroxybutyrate 09/20/2024 0.24     POC Glucose 09/20/2024 244 (H)     Sodium 09/21/2024 143     Potassium 09/21/2024 3.4 (L)     Chloride 09/21/2024 108     CO2 09/21/2024 23     ANION GAP 09/21/2024 12     BUN 09/21/2024 27 (H)     Creatinine 09/21/2024 1.66 (H)     Glucose 09/21/2024 66     Calcium 09/21/2024 8.6     eGFR 09/21/2024 44     WBC 09/21/2024 5.28     RBC 09/21/2024 4.96     Hemoglobin 09/21/2024 14.7     Hematocrit 09/21/2024 41.9     MCV 09/21/2024 85     MCH 09/21/2024 29.6     MCHC 09/21/2024 35.1     RDW 09/21/2024 13.4     Platelets 09/21/2024 121 (L)     MPV 09/21/2024 11.0     POC Glucose 09/21/2024 72     POC Glucose 09/21/2024 217 (H)     POC Glucose 09/21/2024 200 (H)     POC Glucose 09/21/2024 162 (H)     POC Glucose 09/21/2024 108     POC Glucose 09/21/2024 115     WBC  09/22/2024 6.11     RBC 09/22/2024 4.85     Hemoglobin 09/22/2024 14.0     Hematocrit 09/22/2024 43.1     MCV 09/22/2024 89     MCH 09/22/2024 28.9     MCHC 09/22/2024 32.5     RDW 09/22/2024 13.2     MPV 09/22/2024 10.8     Platelets 09/22/2024 117 (L)     nRBC 09/22/2024 0     Segmented % 09/22/2024 63     Immature Grans % 09/22/2024 1     Lymphocytes % 09/22/2024 20     Monocytes % 09/22/2024 13 (H)     Eosinophils Relative 09/22/2024 3     Basophils Relative 09/22/2024 0     Absolute Neutrophils 09/22/2024 3.88     Absolute Immature Grans 09/22/2024 0.04     Absolute Lymphocytes 09/22/2024 1.19     Absolute Monocytes 09/22/2024 0.79     Eosinophils Absolute 09/22/2024 0.19     Basophils Absolute 09/22/2024 0.02     Sodium 09/22/2024 141     Potassium 09/22/2024 3.6     Chloride 09/22/2024 107     CO2 09/22/2024 24     ANION GAP 09/22/2024 10     BUN 09/22/2024 25     Creatinine 09/22/2024 1.60 (H)     Glucose 09/22/2024 154 (H)     Calcium 09/22/2024 8.9     eGFR 09/22/2024 46     POC Glucose 09/22/2024 146 (H)     POC Glucose 09/22/2024 181 (H)     POC Glucose 09/22/2024 107     POC Glucose 09/22/2024 158 (H)     Sodium 09/23/2024 141     Potassium 09/23/2024 3.6     Chloride 09/23/2024 107     CO2 09/23/2024 25     ANION GAP 09/23/2024 9     BUN 09/23/2024 21     Creatinine 09/23/2024 1.33 (H)     Glucose 09/23/2024 167 (H)     Calcium 09/23/2024 8.7     eGFR 09/23/2024 58     POC Glucose 09/23/2024 163 (H)     Magnesium 09/23/2024 1.9     Phosphorus 09/23/2024 2.7        Imaging Studies: I have personally reviewed pertinent imaging studies.

## 2024-09-23 NOTE — UTILIZATION REVIEW
NOTIFICATION OF INPATIENT MEDICAL ADMISSION   AUTHORIZATION REQUEST   SERVICING FACILITY:   [address] [address]  Tax ID: [id number]  NPI: [id number] ATTENDING PROVIDER:  Attending Name and NPI#: Daniel Irvin Md [[id number]]  Address: [address] [place] [address]  Phone: [phone]     ADMISSION INFORMATION:  Place of Service: Inpatient [facility]  Place of Service Code: 21  Inpatient Admission Date/Time: 9/20/24  1:25 PM  Discharge Date/Time: No discharge date for patient encounter.  Admitting Diagnosis Code/Description:  Diarrhea [R19.7]  Abdominal pain [R10.9]  Nausea and vomiting [R11.2]  Generalized abdominal pain [R10.84]  Diabetic ketoacidosis without coma associated with type 2 diabetes mellitus (HCC) [E11.10]     UTILIZATION REVIEW CONTACT:  Debra Wright, Utilization   Network Utilization Review Department  Phone: [phone]  Fax [phone]  Email: [email or web address]  Contact for approvals/pending authorizations, clinical reviews, and discharge.     PHYSICIAN ADVISORY SERVICES:  Medical Necessity Denial & Otni-ro-Vuwn Review  Phone: [phone]  Fax: [phone]  Email: [email or web address]     DISCHARGE SUPPORT TEAM:  For Patients Discharge Needs & Updates  Phone: [phone] opt. 2 Fax: [phone]  Email: [email or web address]

## 2024-09-23 NOTE — ASSESSMENT & PLAN NOTE
History of cirrhosis, reportedly medication induced.  Compensated with no encephalopathy  CT with no ascites.  No peripheral edema on exam.  Albumin normal at 4.3.  Now with abdominal distention and tenderness  Obtain abdominal US and consult GI   Ned Power was admitted to 709 from ED via cart.   Reason for hospitalization is SOB.   Upon arrival, patient is stable. Patient has history significant for CAD, MI & stent placement 2016, and HTN .  Patient oriented to bed, call light, , room and unit.  Patient provided with the following educational materials upon admission:safety, advanced directives, infection control and pain.   Level of understanding patient verbalized understanding.   Admission orders received at this time.   Dr. Medel notified of patient arrival.   See Epic documentation for patient individualized nursing care plan.

## 2024-09-23 NOTE — CASE MANAGEMENT
Case Management Assessment & Discharge Planning Note    Patient name Sg Kaplan  Location 7T [facility] 706/7T [facility] 706-02 MRN [id number]  : 1965 Date 2024       Current Admission Date: 2024  Current Admission Diagnosis:Diarrhea of presumed infectious origin   Patient Active Problem List    Diagnosis Date Noted Date Diagnosed    Diarrhea of presumed infectious origin 2024     Type 2 diabetes mellitus with hyperglycemia, with long-term current use of insulin (HCC) 2024     Anxiety 2024     Acute kidney injury (HCC) 2024     Liver cirrhosis (HCC) 2020     Other vitamin B12 deficiency anemias 2019     Thrombocytopenia (HCC) 2018     Anemia, unspecified 2018       LOS (days): 3  Geometric Mean LOS (GMLOS) (days):   Days to GMLOS:     OBJECTIVE:    Risk of Unplanned Readmission Score: 9.31         Current admission status: Inpatient      Preferred Pharmacy:   [id number] [place] - [address] [place] [id number]  Phone: [phone] Fax: [phone]    Primary Care Provider: Arnie Kat MD    Primary Insurance: [employer]ICARE [facility]  Secondary Insurance: "[employer] [facility]    ASSESSMENT:  Active Health Care Proxies    There are no active Health Care Proxies on file.       Advance Directives  Does patient have a Health Care POA?: No  Was patient offered paperwork?: Yes (Declines)  Does patient currently have a Health Care decision maker?: No  Does patient have Advance Directives?: No  Was patient offered paperwork?: Yes (Declines)  Primary Contact: Tatum Kaplan (Mother)  [phone] (Home Phone)      Readmission Root Cause  30 Day Readmission: No    Patient Information  Admitted from:: Home  Mental Status: Alert  During Assessment patient was accompanied by: Not accompanied during assessment  Assessment information provided by:: Patient  Primary Caregiver:  Self  Support Systems: Family members, Self, Parent  County of Residence: [place]  What city do you live in?: [facility]  Home entry access options. Select all that apply.: Stairs  Number of steps to enter home.: 2  Do the steps have railings?: Yes  Type of Current Residence: 2 story home  Upon entering residence, is there a bedroom on the main floor (no further steps)?: No  A bedroom is located on the following floor levels of residence (select all that apply):: 2nd Floor  Upon entering residence, is there a bathroom on the main floor (no further steps)?: No  Indicate which floors of current residence have a bathroom (select all the apply):: 2nd Floor  Number of steps to 2nd floor from main floor: One Flight  Living Arrangements: Lives w/ Parent(s)  Is patient a ?: No    Activities of Daily Living Prior to Admission  Functional Status: Independent  Completes ADLs independently?: Yes  Ambulates independently?: Yes  Does patient use assisted devices?: No  Does patient currently own DME?: No  Does patient have a history of Outpatient Therapy (PT/OT)?: No  Does the patient have a history of Short-Term Rehab?: No  Does patient have a history of HHC?: No  Does patient currently have HHC?: No      Patient Information Continued  Income Source: SSI/SSD  Does patient have prescription coverage?: Yes  Does patient receive dialysis treatments?: No  Does patient have a history of substance abuse?: No  Does patient have a history of Mental Health Diagnosis?: Yes (Anxiety Bipolar disorder)  Is patient receiving treatment for mental health?: Yes  Has patient received inpatient treatment related to mental health in the last 2 years?: No      Means of Transportation  Means of Transport to [facility]:: Family transport      Social Determinants of Health (SDOH)      Flowsheet Row Most Recent Value   Housing Stability    In the last 12 months, was there a time when you were not able to pay the mortgage or rent on time? N   In the past  12 months, how many times have you moved where you were living? 0   At any time in the past 12 months, were you homeless or living in a shelter (including now)? N   Transportation Needs    In the past 12 months, has lack of transportation kept you from medical appointments or from getting medications? no   In the past 12 months, has lack of transportation kept you from meetings, work, or from getting things needed for daily living? No   Food Insecurity    Within the past 12 months, you worried that your food would run out before you got the money to buy more. Never true   Within the past 12 months, the food you bought just didn't last and you didn't have money to get more. Never true   Utilities    In the past 12 months has the electric, gas, oil, or water company threatened to shut off services in your home? No            DISCHARGE DETAILS:    Discharge planning discussed with:: Patient  Freedom of Choice: Yes     CM contacted family/caregiver?: No- see comments (AAOx3)  Were Treatment Team discharge recommendations reviewed with patient/caregiver?: Yes      Requested Home Health Care         Is the patient interested in HHC at discharge?: No    DME Referral Provided  Referral made for DME?: No      Would you like to participate in our [facility] service program?  : No - Declined    Treatment Team Recommendation: Home       Additional Comments: Met with patient at bedside and CM assessment completed.  IPTA.  CM departemnt following thru discharge

## 2024-09-23 NOTE — ASSESSMENT & PLAN NOTE
Likely viral.  Low suspicion for C. difficile or bacterial etiology.  CT abdomen showed mild diffuse enteritis/ileus  Diarrhea resolved but did have an episode last night   Continue diabetic diet   GI consult

## 2024-09-24 PROBLEM — E83.42 HYPOMAGNESEMIA: Status: ACTIVE | Noted: 2024-09-24

## 2024-09-24 PROBLEM — E83.39 HYPOPHOSPHATEMIA: Status: ACTIVE | Noted: 2024-09-24

## 2024-09-24 PROBLEM — N17.9 ACUTE KIDNEY INJURY (HCC): Status: RESOLVED | Noted: 2024-09-20 | Resolved: 2024-09-24

## 2024-09-24 LAB
ALBUMIN SERPL BCG-MCNC: 3.6 G/DL (ref 3.5–5)
ALP SERPL-CCNC: 88 U/L (ref 34–104)
ALT SERPL W P-5'-P-CCNC: 16 U/L (ref 7–52)
ANION GAP SERPL CALCULATED.3IONS-SCNC: 9 MMOL/L (ref 4–13)
AST SERPL W P-5'-P-CCNC: 14 U/L (ref 13–39)
BASOPHILS # BLD AUTO: 0.02 THOUSANDS/ΜL (ref 0–0.1)
BASOPHILS NFR BLD AUTO: 0 % (ref 0–1)
BILIRUB SERPL-MCNC: 0.55 MG/DL (ref 0.2–1)
BUN SERPL-MCNC: 16 MG/DL (ref 5–25)
CALCIUM SERPL-MCNC: 8.9 MG/DL (ref 8.4–10.2)
CHLORIDE SERPL-SCNC: 106 MMOL/L (ref 96–108)
CO2 SERPL-SCNC: 26 MMOL/L (ref 21–32)
CREAT SERPL-MCNC: 1.19 MG/DL (ref 0.6–1.3)
EOSINOPHIL # BLD AUTO: 0 THOUSAND/ΜL (ref 0–0.61)
EOSINOPHIL NFR BLD AUTO: 0 % (ref 0–6)
ERYTHROCYTE [DISTWIDTH] IN BLOOD BY AUTOMATED COUNT: 12.9 % (ref 11.6–15.1)
GFR SERPL CREATININE-BSD FRML MDRD: 66 ML/MIN/1.73SQ M
GLUCOSE SERPL-MCNC: 141 MG/DL (ref 65–140)
GLUCOSE SERPL-MCNC: 148 MG/DL (ref 65–140)
GLUCOSE SERPL-MCNC: 170 MG/DL (ref 65–140)
GLUCOSE SERPL-MCNC: 207 MG/DL (ref 65–140)
GLUCOSE SERPL-MCNC: 210 MG/DL (ref 65–140)
HCT VFR BLD AUTO: 41.9 % (ref 36.5–49.3)
HGB BLD-MCNC: 13.6 G/DL (ref 12–17)
IMM GRANULOCYTES # BLD AUTO: 0.01 THOUSAND/UL (ref 0–0.2)
IMM GRANULOCYTES NFR BLD AUTO: 0 % (ref 0–2)
LYMPHOCYTES # BLD AUTO: 1.32 THOUSANDS/ΜL (ref 0.6–4.47)
LYMPHOCYTES NFR BLD AUTO: 28 % (ref 14–44)
MAGNESIUM SERPL-MCNC: 1.8 MG/DL (ref 1.9–2.7)
MCH RBC QN AUTO: 28.9 PG (ref 26.8–34.3)
MCHC RBC AUTO-ENTMCNC: 32.5 G/DL (ref 31.4–37.4)
MCV RBC AUTO: 89 FL (ref 82–98)
MONOCYTES # BLD AUTO: 0.69 THOUSAND/ΜL (ref 0.17–1.22)
MONOCYTES NFR BLD AUTO: 14 % (ref 4–12)
NEUTROPHILS # BLD AUTO: 2.74 THOUSANDS/ΜL (ref 1.85–7.62)
NEUTS SEG NFR BLD AUTO: 58 % (ref 43–75)
NRBC BLD AUTO-RTO: 0 /100 WBCS
PHOSPHATE SERPL-MCNC: 2.3 MG/DL (ref 2.7–4.5)
PLATELET # BLD AUTO: 108 THOUSANDS/UL (ref 149–390)
PMV BLD AUTO: 11.4 FL (ref 8.9–12.7)
POTASSIUM SERPL-SCNC: 3.7 MMOL/L (ref 3.5–5.3)
PROT SERPL-MCNC: 6.2 G/DL (ref 6.4–8.4)
RBC # BLD AUTO: 4.7 MILLION/UL (ref 3.88–5.62)
SODIUM SERPL-SCNC: 141 MMOL/L (ref 135–147)
WBC # BLD AUTO: 4.78 THOUSAND/UL (ref 4.31–10.16)

## 2024-09-24 PROCEDURE — 87506 IADNA-DNA/RNA PROBE TQ 6-11: CPT | Performed by: DIETITIAN, REGISTERED

## 2024-09-24 PROCEDURE — 84100 ASSAY OF PHOSPHORUS: CPT | Performed by: NURSE PRACTITIONER

## 2024-09-24 PROCEDURE — 82948 REAGENT STRIP/BLOOD GLUCOSE: CPT

## 2024-09-24 PROCEDURE — 99232 SBSQ HOSP IP/OBS MODERATE 35: CPT | Performed by: INTERNAL MEDICINE

## 2024-09-24 PROCEDURE — 87177 OVA AND PARASITES SMEARS: CPT | Performed by: DIETITIAN, REGISTERED

## 2024-09-24 PROCEDURE — 83735 ASSAY OF MAGNESIUM: CPT | Performed by: NURSE PRACTITIONER

## 2024-09-24 PROCEDURE — 99232 SBSQ HOSP IP/OBS MODERATE 35: CPT | Performed by: NURSE PRACTITIONER

## 2024-09-24 PROCEDURE — 87209 SMEAR COMPLEX STAIN: CPT | Performed by: DIETITIAN, REGISTERED

## 2024-09-24 PROCEDURE — 80053 COMPREHEN METABOLIC PANEL: CPT | Performed by: NURSE PRACTITIONER

## 2024-09-24 PROCEDURE — 85025 COMPLETE CBC W/AUTO DIFF WBC: CPT | Performed by: NURSE PRACTITIONER

## 2024-09-24 RX ORDER — INSULIN GLARGINE 100 [IU]/ML
35 INJECTION, SOLUTION SUBCUTANEOUS
Status: DISCONTINUED | OUTPATIENT
Start: 2024-09-24 | End: 2024-09-25 | Stop reason: HOSPADM

## 2024-09-24 RX ORDER — SIMETHICONE 80 MG
80 TABLET,CHEWABLE ORAL
Status: DISCONTINUED | OUTPATIENT
Start: 2024-09-24 | End: 2024-09-25 | Stop reason: HOSPADM

## 2024-09-24 RX ORDER — MAGNESIUM HYDROXIDE/ALUMINUM HYDROXICE/SIMETHICONE 120; 1200; 1200 MG/30ML; MG/30ML; MG/30ML
30 SUSPENSION ORAL EVERY 4 HOURS PRN
Status: DISCONTINUED | OUTPATIENT
Start: 2024-09-24 | End: 2024-09-25 | Stop reason: HOSPADM

## 2024-09-24 RX ORDER — MAGNESIUM SULFATE HEPTAHYDRATE 40 MG/ML
2 INJECTION, SOLUTION INTRAVENOUS ONCE
Status: COMPLETED | OUTPATIENT
Start: 2024-09-24 | End: 2024-09-25

## 2024-09-24 RX ADMIN — DICYCLOMINE HYDROCHLORIDE 20 MG: 20 TABLET ORAL at 06:43

## 2024-09-24 RX ADMIN — SIMETHICONE 80 MG: 80 TABLET, CHEWABLE ORAL at 00:26

## 2024-09-24 RX ADMIN — ACETAMINOPHEN 650 MG: 325 TABLET ORAL at 00:26

## 2024-09-24 RX ADMIN — SERTRALINE HYDROCHLORIDE 100 MG: 100 TABLET ORAL at 08:18

## 2024-09-24 RX ADMIN — DIBASIC SODIUM PHOSPHATE, MONOBASIC POTASSIUM PHOSPHATE AND MONOBASIC SODIUM PHOSPHATE 1 TABLET: 852; 155; 130 TABLET ORAL at 16:15

## 2024-09-24 RX ADMIN — DIBASIC SODIUM PHOSPHATE, MONOBASIC POTASSIUM PHOSPHATE AND MONOBASIC SODIUM PHOSPHATE 1 TABLET: 852; 155; 130 TABLET ORAL at 11:48

## 2024-09-24 RX ADMIN — SIMETHICONE 80 MG: 80 TABLET, CHEWABLE ORAL at 16:15

## 2024-09-24 RX ADMIN — PANTOPRAZOLE SODIUM 40 MG: 40 TABLET, DELAYED RELEASE ORAL at 08:18

## 2024-09-24 RX ADMIN — INSULIN GLARGINE 35 UNITS: 100 INJECTION, SOLUTION SUBCUTANEOUS at 21:00

## 2024-09-24 RX ADMIN — DICYCLOMINE HYDROCHLORIDE 20 MG: 20 TABLET ORAL at 11:48

## 2024-09-24 RX ADMIN — SIMETHICONE 80 MG: 80 TABLET, CHEWABLE ORAL at 09:37

## 2024-09-24 RX ADMIN — MAGNESIUM SULFATE HEPTAHYDRATE 2 G: 2 INJECTION, SOLUTION INTRAVENOUS at 09:37

## 2024-09-24 RX ADMIN — INSULIN LISPRO 1 UNITS: 100 INJECTION, SOLUTION INTRAVENOUS; SUBCUTANEOUS at 06:43

## 2024-09-24 RX ADMIN — INSULIN LISPRO 1 UNITS: 100 INJECTION, SOLUTION INTRAVENOUS; SUBCUTANEOUS at 11:49

## 2024-09-24 RX ADMIN — CLOZAPINE 200 MG: 200 TABLET ORAL at 21:00

## 2024-09-24 RX ADMIN — DICYCLOMINE HYDROCHLORIDE 20 MG: 20 TABLET ORAL at 16:15

## 2024-09-24 RX ADMIN — DIBASIC SODIUM PHOSPHATE, MONOBASIC POTASSIUM PHOSPHATE AND MONOBASIC SODIUM PHOSPHATE 1 TABLET: 852; 155; 130 TABLET ORAL at 21:00

## 2024-09-24 RX ADMIN — SIMETHICONE 80 MG: 80 TABLET, CHEWABLE ORAL at 21:00

## 2024-09-24 NOTE — ASSESSMENT & PLAN NOTE
Lab Results   Component Value Date    HGBA1C 8.2 (H) 07/27/2024     With hyperglycemia on admission in the setting of acute infection/stress, resolved  Increase Lantus from 32 to 35 units at night  DC standing dose humalog to avoid hypoglycemia given decreased oral intake   Continue sliding scale and adjust as needed

## 2024-09-24 NOTE — ASSESSMENT & PLAN NOTE
Likely viral versus functional.   CT abdomen showed mild diffuse enteritis/ileus  Diarrhea initially resolved but resumed after diet was advanced  Low suspicion for C. difficile or bacterial etiology given less than 3 BMs per day  Suspecting gastroenteritis versus functional diarrhea  Appreciate GI input  Added Bentyl 20 mg 3 times daily with meals with minimal improvement  Given gaseous distention, will add simethicone 4 times daily  Will send for stool samples to rule out infectious etiology per GI  Change diet to low fiber low residue  Monitor, supportive care

## 2024-09-24 NOTE — ASSESSMENT & PLAN NOTE
Due to prerenal azotemia from diarrhea and GI loss on top of cirrhosis  Creatinine 2.23 on admission, baseline 1.2-1.3  Creatinine improved to 1.19 with IV fluids  Monitor off IV fluids

## 2024-09-24 NOTE — PROGRESS NOTES
Progress Note - Hospitalist   Name: Sg Kaplan 59 y.o. male I MRN: [id number]  Unit/Bed#: 7T [facility] 706-02 I Date of Admission: 9/20/2024   Date of Service: 9/24/2024 I Hospital Day: 4    Assessment & Plan  Acute kidney injury (HCC) (Resolved: 9/24/2024)  Due to prerenal azotemia from diarrhea and GI loss on top of cirrhosis  Creatinine 2.23 on admission, baseline 1.2-1.3  Creatinine improved to 1.19 with IV fluids  Monitor off IV fluids  Diarrhea of presumed infectious origin  Likely viral versus functional.   CT abdomen showed mild diffuse enteritis/ileus  Diarrhea initially resolved but resumed after diet was advanced  Low suspicion for C. difficile or bacterial etiology given less than 3 BMs per day  Suspecting gastroenteritis versus functional diarrhea  Appreciate GI input  Added Bentyl 20 mg 3 times daily with meals with minimal improvement  Given gaseous distention, will add simethicone 4 times daily  Will send for stool samples to rule out infectious etiology per GI  Change diet to low fiber low residue  Monitor, supportive care  Type 2 diabetes mellitus with hyperglycemia, with long-term current use of insulin (HCC)  Lab Results   Component Value Date    HGBA1C 8.2 (H) 07/27/2024     With hyperglycemia on admission in the setting of acute infection/stress, resolved  Increase Lantus from 32 to 35 units at night  DC standing dose humalog to avoid hypoglycemia given decreased oral intake   Continue sliding scale and adjust as needed  Liver cirrhosis (HCC)  History of cirrhosis, reportedly medication induced.  Compensated with no encephalopathy  CT with no ascites.  No peripheral edema on exam.  Albumin normal at 4.3.  Abdominal US with no scites  Appreciate GI input - recommend outpatient US elastography and outpatient EGD for variceal screening   Anxiety  Mood stable.  Continue Clozaril 200 mg daily, Xanax 0.5 mg twice daily as needed and Zoloft 100 mg daily  Thrombocytopenia (HCC)  Stable, no signs of  bleeding or bruising   Likely due to cirrhosis  Monitor   Hypophosphatemia  K 3.7  Phos 2.3  Likely from decreased oral intake   Will add Neutra phos 1 tab x3 doses today  Monitor electrolytes in am   Hypomagnesemia  Mag 1.8 in the setting of decreased oral intake and diarrhea  Replete with mag sulfate 2 g IV x 1  Monitor magnesium in the morning    VTE Pharmacologic Prophylaxis: VTE Score: 2 Moderate Risk (Score 3-4) - Pharmacological DVT Prophylaxis Contraindicated. Sequential Compression Devices Ordered.    Mobility:   Basic Mobility Inpatient Raw Score: 23  JH-HLM Goal: 7: Walk 25 feet or more  JH-HLM Achieved: 7: Walk 25 feet or more  JH-HLM Goal achieved. Continue to encourage appropriate mobility.    Patient Centered Rounds: I performed bedside rounds with nursing staff today.   Discussions with Specialists or Other Care Team Provider: nursing, CM, GI    Education and Discussions with Family / Patient: Patient declined call to . States he will keep his parents updated     Current Length of Stay: 4 day(s)  Current Patient Status: Inpatient   Certification Statement: The patient will continue to require additional inpatient hospital stay due to abdominal pain, diarrhea, decreased appetite   Discharge Plan: Anticipate discharge tomorrow to home.    Code Status: Level 1 - Full Code    Subjective   Patient seen and examined at bedside.  He had a few bites of pancakes and 4 ounces of orange juice this morning for breakfast followed by an episode of diarrhea for which he describes was green and foul-smelling.  He reports lower abdominal tenderness which is worse with palpation.  He states he had a lot of pain last night for which she was given simethicone.  He states at home he has 1-2 episodes per week of diarrhea at most.  He states this is more diarrhea than his baseline.  He denies headache, chest pain, shortness of breath, or vomiting.  Objective     Vitals:   Temp (24hrs), Av.3 °F (36.3 °C),  Min:97 °F (36.1 °C), Max:97.8 °F (36.6 °C)    Temp:  [97 °F (36.1 °C)-97.8 °F (36.6 °C)] 97.1 °F (36.2 °C)  HR:  [75-78] 77  Resp:  [18-20] 20  BP: (114-118)/(69-85) 115/85  SpO2:  [95 %-97 %] 97 %  Body mass index is 28.13 kg/m².     Input and Output Summary (last 24 hours):     Intake/Output Summary (Last 24 hours) at 9/24/2024 0925  Last data filed at 9/24/2024 0819  Gross per 24 hour   Intake 1842.5 ml   Output --   Net 1842.5 ml       Physical Exam  Vitals and nursing note reviewed.   Constitutional:       General: He is not in acute distress.     Appearance: He is well-developed. He is not diaphoretic.   HENT:      Head: Normocephalic.      Mouth/Throat:      Mouth: Oropharynx is clear and moist.   Eyes:      General: No scleral icterus.        Right eye: No discharge.         Left eye: No discharge.      Extraocular Movements: EOM normal.      Conjunctiva/sclera: Conjunctivae normal.      Pupils: Pupils are equal, round, and reactive to light.   Neck:      Vascular: No JVD.   Cardiovascular:      Rate and Rhythm: Normal rate and regular rhythm.      Heart sounds: Normal heart sounds.   Pulmonary:      Effort: Pulmonary effort is normal. No respiratory distress.      Breath sounds: Normal breath sounds. No wheezing, rhonchi or rales.   Abdominal:      General: Bowel sounds are normal. There is distension (Soft).      Palpations: Abdomen is soft. There is no mass.      Tenderness: There is abdominal tenderness. There is no guarding or rebound.   Musculoskeletal:         General: No tenderness or edema. Normal range of motion.      Cervical back: Normal range of motion and neck supple.   Skin:     General: Skin is warm and dry.      Findings: No erythema or rash.   Neurological:      Mental Status: He is alert and oriented to person, place, and time.      Cranial Nerves: No cranial nerve deficit.      Deep Tendon Reflexes: Reflexes are normal and symmetric.   Psychiatric:         Mood and Affect: Mood and affect  normal. Affect is flat.         Speech: Speech normal.         Behavior: Behavior is slowed.         Judgment: Judgment normal.          Lines/Drains:  Lines/Drains/Airways       Active Status       None                            Lab Results: I have reviewed the following results:    Results from last 7 days   Lab Units 09/24/24  0500   WBC Thousand/uL 4.78   HEMOGLOBIN g/dL 13.6   HEMATOCRIT % 41.9   PLATELETS Thousands/uL 108*   SEGS PCT % 58   LYMPHO PCT % 28   MONO PCT % 14*   EOS PCT % 0     Results from last 7 days   Lab Units 09/24/24  0500   SODIUM mmol/L 141   POTASSIUM mmol/L 3.7   CHLORIDE mmol/L 106   CO2 mmol/L 26   BUN mg/dL 16   CREATININE mg/dL 1.19   ANION GAP mmol/L 9   CALCIUM mg/dL 8.9   ALBUMIN g/dL 3.6   TOTAL BILIRUBIN mg/dL 0.55   ALK PHOS U/L 88   ALT U/L 16   AST U/L 14   GLUCOSE RANDOM mg/dL 207*     Results from last 7 days   Lab Units 09/23/24  1053   INR  0.98     Results from last 7 days   Lab Units 09/24/24  0610 09/23/24  2105 09/23/24  1544 09/23/24  1045 09/23/24  0534 09/22/24  2042 09/22/24  1525 09/22/24  1101 09/22/24  0547 09/21/24  2355 09/21/24  2032 09/21/24  1601   POC GLUCOSE mg/dl 210* 152* 166* 218* 163* 158* 107 181* 146* 115 108 162*               Recent Cultures (last 7 days):         Imaging Review: Reviewed radiology reports from this admission including: CT abdomen/pelvis and Ultrasound(s).  Other Studies: No additional pertinent studies reviewed.    Last 24 Hours Medication List:     Current Facility-Administered Medications:     acetaminophen (TYLENOL) tablet 650 mg, Q6H PRN    ALPRAZolam (XANAX) tablet 0.5 mg, BID PRN    clozapine (CLOZARIL) tablet 200 mg, HS    dicyclomine (BENTYL) tablet 20 mg, TID AC    insulin glargine (LANTUS) subcutaneous injection 35 Units 0.35 mL, HS    insulin lispro (HumALOG/ADMELOG) 100 units/mL subcutaneous injection 1-5 Units, TID AC **AND** Fingerstick Glucose (POCT), TID AC    magnesium sulfate 2 g/50 mL IVPB (premix) 2 g,  Once    ondansetron (ZOFRAN) injection 4 mg, Q6H PRN    pantoprazole (PROTONIX) EC tablet 40 mg, Daily    potassium-sodium phosphateS (K-PHOS,PHOSPHA 250) -250 mg tablet 1 tablet, 4x Daily (with meals and at bedtime)    sertraline (ZOLOFT) tablet 100 mg, Daily    simethicone (MYLICON) chewable tablet 80 mg, Q6H PRN    simethicone (MYLICON) chewable tablet 80 mg, 4x Daily (with meals and at bedtime)    Administrative Statements   Today, Patient Was Seen By: BARBARA Mckee  I have spent a total time of  minutes in caring for this patient on the day of the visit/encounter including Diagnostic results, Instructions for management, Patient and family education, Counseling / Coordination of care, Documenting in the medical record, Reviewing / ordering tests, medicine, procedures  , Obtaining or reviewing history  , and Communicating with other healthcare professionals .    **Please Note: This note may have been constructed using a voice recognition system.**

## 2024-09-24 NOTE — ASSESSMENT & PLAN NOTE
K 3.7  Phos 2.3  Likely from decreased oral intake   Will add Neutra phos 1 tab x3 doses today  Monitor electrolytes in am

## 2024-09-24 NOTE — PLAN OF CARE
Problem: PAIN - ADULT  Goal: Verbalizes/displays adequate comfort level or baseline comfort level  Description: Interventions:  - Encourage patient to monitor pain and request assistance  - Assess pain using appropriate pain scale  - Administer analgesics based on type and severity of pain and evaluate response  - Implement non-pharmacological measures as appropriate and evaluate response  - Consider cultural and social influences on pain and pain management  - Notify physician/advanced practitioner if interventions unsuccessful or patient reports new pain  Outcome: Progressing     Problem: INFECTION - ADULT  Goal: Absence or prevention of progression during hospitalization  Description: INTERVENTIONS:  - Assess and monitor for signs and symptoms of infection  - Monitor lab/diagnostic results  - Monitor all insertion sites, i.e. indwelling lines, tubes, and drains  - Monitor endotracheal if appropriate and nasal secretions for changes in amount and color  - [place] appropriate cooling/warming therapies per order  - Administer medications as ordered  - Instruct and encourage patient and family to use good hand hygiene technique  - Identify and instruct in appropriate isolation precautions for identified infection/condition  Outcome: Progressing  Goal: Absence of fever/infection during neutropenic period  Description: INTERVENTIONS:  - Monitor WBC    Outcome: Progressing     Problem: SAFETY ADULT  Goal: Patient will remain free of falls  Description: INTERVENTIONS:  - Educate patient/family on patient safety including physical limitations  - Instruct patient to call for assistance with activity   - Consult OT/PT to assist with strengthening/mobility   - Keep Call bell within reach  - Keep bed low and locked with side rails adjusted as appropriate  - Keep care items and personal belongings within reach  - Initiate and maintain comfort rounds  - Make Fall Risk Sign visible to staff  - Apply yellow socks and bracelet  for high fall risk patients  - Consider moving patient to room near nurses station  Outcome: Progressing  Goal: Maintain or return to baseline ADL function  Description: INTERVENTIONS:  -  Assess patient's ability to carry out ADLs; assess patient's baseline for ADL function and identify physical deficits which impact ability to perform ADLs (bathing, care of mouth/teeth, toileting, grooming, dressing, etc.)  - Assess/evaluate cause of self-care deficits   - Assess range of motion  - Assess patient's mobility; develop plan if impaired  - Assess patient's need for assistive devices and provide as appropriate  - Encourage maximum independence but intervene and supervise when necessary  - Involve family in performance of ADLs  - Assess for home care needs following discharge   - Consider OT consult to assist with ADL evaluation and planning for discharge  - Provide patient education as appropriate  Outcome: Progressing  Goal: Maintains/Returns to pre admission functional level  Description: INTERVENTIONS:  - Perform AM-PAC 6 Click Basic Mobility/ Daily Activity assessment daily.  - Set and communicate daily mobility goal to care team and patient/family/caregiver.   - Collaborate with rehabilitation services on mobility goals if consulted  - Out of bed for toileting  - Record patient progress and toleration of activity level   Outcome: Progressing     Problem: DISCHARGE PLANNING  Goal: Discharge to home or other facility with appropriate resources  Description: INTERVENTIONS:  - Identify barriers to discharge w/patient and caregiver  - Arrange for needed discharge resources and transportation as appropriate  - Identify discharge learning needs (meds, wound care, etc.)  - Arrange for interpretive services to assist at discharge as needed  - Refer to Case Management Department for coordinating discharge planning if the patient needs post-hospital services based on physician/advanced practitioner order or complex needs  related to functional status, cognitive ability, or social support system  Outcome: Progressing     Problem: Knowledge Deficit  Goal: Patient/family/caregiver demonstrates understanding of disease process, treatment plan, medications, and discharge instructions  Description: Complete learning assessment and assess knowledge base.  Interventions:  - Provide teaching at level of understanding  - Provide teaching via preferred learning methods  Outcome: Progressing     Problem: GASTROINTESTINAL - ADULT  Goal: Maintains or returns to baseline bowel function  Description: INTERVENTIONS:  - Assess bowel function  - Encourage oral fluids to ensure adequate hydration  - Administer IV fluids if ordered to ensure adequate hydration  - Administer ordered medications as needed  - Encourage mobilization and activity  - Consider nutritional services referral to assist patient with adequate nutrition and appropriate food choices  Outcome: Progressing

## 2024-09-24 NOTE — ASSESSMENT & PLAN NOTE
History of cirrhosis, reportedly medication induced.  Compensated with no encephalopathy  CT with no ascites.  No peripheral edema on exam.  Albumin normal at 4.3.  Abdominal US with no scites  Appreciate GI input - recommend outpatient US elastography and outpatient EGD for variceal screening

## 2024-09-24 NOTE — PLAN OF CARE
Problem: PAIN - ADULT  Goal: Verbalizes/displays adequate comfort level or baseline comfort level  Description: Interventions:  - Encourage patient to monitor pain and request assistance  - Assess pain using appropriate pain scale  - Administer analgesics based on type and severity of pain and evaluate response  - Implement non-pharmacological measures as appropriate and evaluate response  - Consider cultural and social influences on pain and pain management  - Notify physician/advanced practitioner if interventions unsuccessful or patient reports new pain  Outcome: Progressing     Problem: INFECTION - ADULT  Goal: Absence or prevention of progression during hospitalization  Description: INTERVENTIONS:  - Assess and monitor for signs and symptoms of infection  - Monitor lab/diagnostic results  - Monitor all insertion sites, i.e. indwelling lines, tubes, and drains  - Monitor endotracheal if appropriate and nasal secretions for changes in amount and color  - [place] appropriate cooling/warming therapies per order  - Administer medications as ordered  - Instruct and encourage patient and family to use good hand hygiene technique  - Identify and instruct in appropriate isolation precautions for identified infection/condition  Outcome: Progressing  Goal: Absence of fever/infection during neutropenic period  Description: INTERVENTIONS:  - Monitor WBC    Outcome: Progressing     Problem: SAFETY ADULT  Goal: Patient will remain free of falls  Description: INTERVENTIONS:  - Educate patient/family on patient safety including physical limitations  - Instruct patient to call for assistance with activity   - Consult OT/PT to assist with strengthening/mobility   - Keep Call bell within reach  - Keep bed low and locked with side rails adjusted as appropriate  - Keep care items and personal belongings within reach  - Initiate and maintain comfort rounds  - Make Fall Risk Sign visible to staff  - Apply yellow socks and bracelet  for high fall risk patients  - Consider moving patient to room near nurses station  Outcome: Progressing  Goal: Maintain or return to baseline ADL function  Description: INTERVENTIONS:  -  Assess patient's ability to carry out ADLs; assess patient's baseline for ADL function and identify physical deficits which impact ability to perform ADLs (bathing, care of mouth/teeth, toileting, grooming, dressing, etc.)  - Assess/evaluate cause of self-care deficits   - Assess range of motion  - Assess patient's mobility; develop plan if impaired  - Assess patient's need for assistive devices and provide as appropriate  - Encourage maximum independence but intervene and supervise when necessary  - Involve family in performance of ADLs  - Assess for home care needs following discharge   - Consider OT consult to assist with ADL evaluation and planning for discharge  - Provide patient education as appropriate  Outcome: Progressing  Goal: Maintains/Returns to pre admission functional level  Description: INTERVENTIONS:  - Perform AM-PAC 6 Click Basic Mobility/ Daily Activity assessment daily.  - Set and communicate daily mobility goal to care team and patient/family/caregiver.   - Collaborate with rehabilitation services on mobility goals if consulted  - Out of bed for toileting  - Record patient progress and toleration of activity level   Outcome: Progressing     Problem: DISCHARGE PLANNING  Goal: Discharge to home or other facility with appropriate resources  Description: INTERVENTIONS:  - Identify barriers to discharge w/patient and caregiver  - Arrange for needed discharge resources and transportation as appropriate  - Identify discharge learning needs (meds, wound care, etc.)  - Arrange for interpretive services to assist at discharge as needed  - Refer to Case Management Department for coordinating discharge planning if the patient needs post-hospital services based on physician/advanced practitioner order or complex needs  related to functional status, cognitive ability, or social support system  Outcome: Progressing     Problem: Knowledge Deficit  Goal: Patient/family/caregiver demonstrates understanding of disease process, treatment plan, medications, and discharge instructions  Description: Complete learning assessment and assess knowledge base.  Interventions:  - Provide teaching at level of understanding  - Provide teaching via preferred learning methods  Outcome: Progressing     Problem: GASTROINTESTINAL - ADULT  Goal: Maintains or returns to baseline bowel function  Description: INTERVENTIONS:  - Assess bowel function  - Encourage oral fluids to ensure adequate hydration  - Administer IV fluids if ordered to ensure adequate hydration  - Administer ordered medications as needed  - Encourage mobilization and activity  - Consider nutritional services referral to assist patient with adequate nutrition and appropriate food choices  Outcome: Progressing     Problem: METABOLIC, FLUID AND ELECTROLYTES - ADULT  Goal: Electrolytes maintained within normal limits  Description: INTERVENTIONS:  - Monitor labs and assess patient for signs and symptoms of electrolyte imbalances  - Administer electrolyte replacement as ordered  - Monitor response to electrolyte replacements, including repeat lab results as appropriate  - Instruct patient on fluid and nutrition as appropriate  Outcome: Progressing  Goal: Fluid balance maintained  Description: INTERVENTIONS:  - Monitor labs   - Monitor I/O and WT  - Instruct patient on fluid and nutrition as appropriate  - Assess for signs & symptoms of volume excess or deficit  Outcome: Progressing  Goal: Glucose maintained within target range  Description: INTERVENTIONS:  - Monitor Blood Glucose as ordered  - Assess for signs and symptoms of hyperglycemia and hypoglycemia  - Administer ordered medications to maintain glucose within target range  - Assess nutritional intake and initiate nutrition service  referral as needed  Outcome: Progressing

## 2024-09-24 NOTE — ASSESSMENT & PLAN NOTE
Mag 1.8 in the setting of decreased oral intake and diarrhea  Replete with mag sulfate 2 g IV x 1  Monitor magnesium in the morning

## 2024-09-24 NOTE — PROGRESS NOTES
Progress Note- Sg Kaplan 59 y.o. male MRN: [id number]    Unit/Bed#: 7T [facility] 706-02 Encounter: [id number]      Assessment and Plan:  59-year-old male with chart-diagnosis of cirrhosis (presumed compensated, does not follow GI) which he attributes to prior psych medications, diabetes, anxiety, schizoaffective disorder admitted with abdominal pain and diarrhea, found to have DENZEL (improved).  GI following for abdominal distension/tenderness and loose stools.    1.  Diarrhea  2.  Lower abdominal pain  3.  Abdominal bloating  4.  Cirrhosis, presumed compensated  Patient originally presented with nausea/vomiting and diarrhea, which has overall improved throughout hospitalization but now with lower abdominal pain and abdominal distension.  CT abdomen pelvis 9/20/2024 showed diffusely dilated and fluid-filled small bowel loops without a transition point, hepatomegaly, and mild splenomegaly; no ascites.  RUQ US 9/23/2024 with no evidence of ascites.  Patient reports a history of cirrhosis related to a past psychiatric medication.     -Suspect abdominal pain related to infectious gastroenteritis.  Given persistent symptoms, recommend infectious stool tests.  -Recommend simethicone 80 mg 4 times daily.  -Continue dicyclomine 20 mg 3 times daily.  -Agree with low residue diet.  -Continue pantoprazole 40 mg once daily.  -Recommend patient's schedule follow-up office visit with GI.  Patient would benefit from US elastography, EGD to screen for esophageal varices, and screening colonoscopy.    GI will continue to follow    ______________________________________________________________________    Subjective:  Patient reports feeling overall the same as yesterday.  Had 1 episode of loose stools yesterday, and another episode this morning after breakfast.  Still with lower abdominal tenderness and abdominal gas/bloating.         Medication Administration - last 24 hours from 09/23/2024 0937 to 09/24/2024 0937         Date/Time Order  "Dose Route Action Action by     09/24/2024 0818 EDT pantoprazole (PROTONIX) EC tablet 40 mg 40 mg Oral Given Deanne Glez RN     09/24/2024 0818 EDT sertraline (ZOLOFT) tablet 100 mg 100 mg Oral Given Deanne Glez RN     09/23/2024 2230 EDT clozapine (CLOZARIL) tablet 200 mg 200 mg Oral Given Lana David RN     09/23/2024 2230 EDT insulin glargine (LANTUS) subcutaneous injection 32 Units 0.32 mL 32 Units Subcutaneous Given Lana David RN     09/24/2024 0643 EDT insulin lispro (HumALOG/ADMELOG) 100 units/mL subcutaneous injection 1-5 Units 1 Units Subcutaneous Given Lana David RN     09/23/2024 1610 EDT insulin lispro (HumALOG/ADMELOG) 100 units/mL subcutaneous injection 1-5 Units 1 Units Subcutaneous Given Deanne Glez RN     09/23/2024 1054 EDT insulin lispro (HumALOG/ADMELOG) 100 units/mL subcutaneous injection 1-5 Units 1 Units Subcutaneous Given Deanne Glez RN     09/23/2024 1040 EDT potassium chloride oral solution 20 mEq 20 mEq Oral Given Deanne Glez RN     09/24/2024 0819 EDT multi-electrolyte (PLASMALYTE-A/ISOLYTE-S PH 7.4) IV solution 0 mL/hr Intravenous Stopped Deanne Glez RN     09/23/2024 1040 EDT multi-electrolyte (PLASMALYTE-A/ISOLYTE-S PH 7.4) IV solution 50 mL/hr Intravenous New Bag Deanne Glez RN     09/24/2024 0643 EDT dicyclomine (BENTYL) tablet 20 mg 20 mg Oral Given Lana David RN     09/23/2024 1610 EDT dicyclomine (BENTYL) tablet 20 mg 20 mg Oral Given Deanne Glez RN     09/23/2024 1207 EDT dicyclomine (BENTYL) tablet 20 mg 20 mg Oral Given Deanne Glez RN     09/24/2024 0026 EDT acetaminophen (TYLENOL) tablet 650 mg 650 mg Oral Given Lana David RN     09/24/2024 0026 EDT simethicone (MYLICON) chewable tablet 80 mg 80 mg Oral Given Lana David RN            Objective:     Vitals: Blood pressure 115/85, pulse 77, temperature (!) 97.1 °F (36.2 °C), temperature source Temporal, resp. rate 20, height 6' 0.01\" (1.829 m), weight 94.1 kg (207 lb 7.3 oz), SpO2 " 97%.,Body mass index is 28.13 kg/m².      Intake/Output Summary (Last 24 hours) at 9/24/2024 0937  Last data filed at 9/24/2024 0926  Gross per 24 hour   Intake 2142.5 ml   Output --   Net 2142.5 ml       Physical Exam:   General Appearance: Awake and alert, in no acute distress  Abdomen: Soft, non-tender, + distended; bowel sounds normal; no masses or no organomegaly    Invasive Devices       Peripheral Intravenous Line  Duration             Peripheral IV 09/24/24 Right;Ventral (anterior) Wrist <1 day                    Lab Results:  Admission on 09/20/2024   Component Date Value    WBC 09/20/2024 8.11     RBC 09/20/2024 5.34     Hemoglobin 09/20/2024 15.5     Hematocrit 09/20/2024 46.1     MCV 09/20/2024 86     MCH 09/20/2024 29.0     MCHC 09/20/2024 33.6     RDW 09/20/2024 13.3     MPV 09/20/2024 10.7     Platelets 09/20/2024 126 (L)     nRBC 09/20/2024 0     Segmented % 09/20/2024 80 (H)     Immature Grans % 09/20/2024 0     Lymphocytes % 09/20/2024 11 (L)     Monocytes % 09/20/2024 9     Eosinophils Relative 09/20/2024 0     Basophils Relative 09/20/2024 0     Absolute Neutrophils 09/20/2024 6.50     Absolute Immature Grans 09/20/2024 0.02     Absolute Lymphocytes 09/20/2024 0.87     Absolute Monocytes 09/20/2024 0.69     Eosinophils Absolute 09/20/2024 0.00     Basophils Absolute 09/20/2024 0.03     Lipase 09/20/2024 <6 (L)     Sodium 09/20/2024 134 (L)     Potassium 09/20/2024 3.8     Chloride 09/20/2024 98     CO2 09/20/2024 22     ANION GAP 09/20/2024 14 (H)     BUN 09/20/2024 31 (H)     Creatinine 09/20/2024 2.23 (H)     Glucose 09/20/2024 375 (H)     Calcium 09/20/2024 8.9     eGFR 09/20/2024 31     Total Bilirubin 09/20/2024 0.95     Bilirubin, Direct 09/20/2024 0.17     Alkaline Phosphatase 09/20/2024 111 (H)     AST 09/20/2024 19     ALT 09/20/2024 21     Total Protein 09/20/2024 7.4     Albumin 09/20/2024 4.3     Ammonia 09/20/2024 27     Beta- Hydroxybutyrate 09/20/2024 0.24     POC Glucose  09/20/2024 244 (H)     Sodium 09/21/2024 143     Potassium 09/21/2024 3.4 (L)     Chloride 09/21/2024 108     CO2 09/21/2024 23     ANION GAP 09/21/2024 12     BUN 09/21/2024 27 (H)     Creatinine 09/21/2024 1.66 (H)     Glucose 09/21/2024 66     Calcium 09/21/2024 8.6     eGFR 09/21/2024 44     WBC 09/21/2024 5.28     RBC 09/21/2024 4.96     Hemoglobin 09/21/2024 14.7     Hematocrit 09/21/2024 41.9     MCV 09/21/2024 85     MCH 09/21/2024 29.6     MCHC 09/21/2024 35.1     RDW 09/21/2024 13.4     Platelets 09/21/2024 121 (L)     MPV 09/21/2024 11.0     POC Glucose 09/21/2024 72     POC Glucose 09/21/2024 217 (H)     POC Glucose 09/21/2024 200 (H)     POC Glucose 09/21/2024 162 (H)     POC Glucose 09/21/2024 108     POC Glucose 09/21/2024 115     WBC 09/22/2024 6.11     RBC 09/22/2024 4.85     Hemoglobin 09/22/2024 14.0     Hematocrit 09/22/2024 43.1     MCV 09/22/2024 89     MCH 09/22/2024 28.9     MCHC 09/22/2024 32.5     RDW 09/22/2024 13.2     MPV 09/22/2024 10.8     Platelets 09/22/2024 117 (L)     nRBC 09/22/2024 0     Segmented % 09/22/2024 63     Immature Grans % 09/22/2024 1     Lymphocytes % 09/22/2024 20     Monocytes % 09/22/2024 13 (H)     Eosinophils Relative 09/22/2024 3     Basophils Relative 09/22/2024 0     Absolute Neutrophils 09/22/2024 3.88     Absolute Immature Grans 09/22/2024 0.04     Absolute Lymphocytes 09/22/2024 1.19     Absolute Monocytes 09/22/2024 0.79     Eosinophils Absolute 09/22/2024 0.19     Basophils Absolute 09/22/2024 0.02     Sodium 09/22/2024 141     Potassium 09/22/2024 3.6     Chloride 09/22/2024 107     CO2 09/22/2024 24     ANION GAP 09/22/2024 10     BUN 09/22/2024 25     Creatinine 09/22/2024 1.60 (H)     Glucose 09/22/2024 154 (H)     Calcium 09/22/2024 8.9     eGFR 09/22/2024 46     POC Glucose 09/22/2024 146 (H)     POC Glucose 09/22/2024 181 (H)     POC Glucose 09/22/2024 107     POC Glucose 09/22/2024 158 (H)     Sodium 09/23/2024 141     Potassium 09/23/2024 3.6      Chloride 09/23/2024 107     CO2 09/23/2024 25     ANION GAP 09/23/2024 9     BUN 09/23/2024 21     Creatinine 09/23/2024 1.33 (H)     Glucose 09/23/2024 167 (H)     Calcium 09/23/2024 8.7     eGFR 09/23/2024 58     POC Glucose 09/23/2024 163 (H)     Magnesium 09/23/2024 1.9     Phosphorus 09/23/2024 2.7     Total Bilirubin 09/23/2024 0.66     Bilirubin, Direct 09/23/2024 0.20     Alkaline Phosphatase 09/23/2024 88     AST 09/23/2024 19     ALT 09/23/2024 17     Total Protein 09/23/2024 6.1 (L)     Albumin 09/23/2024 3.6     Protime 09/23/2024 13.3     INR 09/23/2024 0.98     POC Glucose 09/23/2024 218 (H)     POC Glucose 09/23/2024 166 (H)     POC Glucose 09/23/2024 152 (H)     Sodium 09/24/2024 141     Potassium 09/24/2024 3.7     Chloride 09/24/2024 106     CO2 09/24/2024 26     ANION GAP 09/24/2024 9     BUN 09/24/2024 16     Creatinine 09/24/2024 1.19     Glucose 09/24/2024 207 (H)     Calcium 09/24/2024 8.9     AST 09/24/2024 14     ALT 09/24/2024 16     Alkaline Phosphatase 09/24/2024 88     Total Protein 09/24/2024 6.2 (L)     Albumin 09/24/2024 3.6     Total Bilirubin 09/24/2024 0.55     eGFR 09/24/2024 66     WBC 09/24/2024 4.78     RBC 09/24/2024 4.70     Hemoglobin 09/24/2024 13.6     Hematocrit 09/24/2024 41.9     MCV 09/24/2024 89     MCH 09/24/2024 28.9     MCHC 09/24/2024 32.5     RDW 09/24/2024 12.9     MPV 09/24/2024 11.4     Platelets 09/24/2024 108 (L)     nRBC 09/24/2024 0     Segmented % 09/24/2024 58     Immature Grans % 09/24/2024 0     Lymphocytes % 09/24/2024 28     Monocytes % 09/24/2024 14 (H)     Eosinophils Relative 09/24/2024 0     Basophils Relative 09/24/2024 0     Absolute Neutrophils 09/24/2024 2.74     Absolute Immature Grans 09/24/2024 0.01     Absolute Lymphocytes 09/24/2024 1.32     Absolute Monocytes 09/24/2024 0.69     Eosinophils Absolute 09/24/2024 0.00     Basophils Absolute 09/24/2024 0.02     Phosphorus 09/24/2024 2.3 (L)     Magnesium 09/24/2024 1.8 (L)     POC  Glucose 09/24/2024 210 (H)        Imaging Studies: I have personally reviewed pertinent imaging studies.

## 2024-09-25 VITALS
TEMPERATURE: 96.4 F | HEIGHT: 72 IN | RESPIRATION RATE: 20 BRPM | OXYGEN SATURATION: 96 % | WEIGHT: 207.45 LBS | DIASTOLIC BLOOD PRESSURE: 88 MMHG | BODY MASS INDEX: 28.1 KG/M2 | HEART RATE: 72 BPM | SYSTOLIC BLOOD PRESSURE: 117 MMHG

## 2024-09-25 PROBLEM — E83.42 HYPOMAGNESEMIA: Status: RESOLVED | Noted: 2024-09-24 | Resolved: 2024-09-25

## 2024-09-25 PROBLEM — E83.39 HYPOPHOSPHATEMIA: Status: RESOLVED | Noted: 2024-09-24 | Resolved: 2024-09-25

## 2024-09-25 LAB
ALBUMIN SERPL BCG-MCNC: 3.6 G/DL (ref 3.5–5)
ALP SERPL-CCNC: 88 U/L (ref 34–104)
ALT SERPL W P-5'-P-CCNC: 11 U/L (ref 7–52)
ANION GAP SERPL CALCULATED.3IONS-SCNC: 9 MMOL/L (ref 4–13)
AST SERPL W P-5'-P-CCNC: 17 U/L (ref 13–39)
BILIRUB SERPL-MCNC: 0.68 MG/DL (ref 0.2–1)
BUN SERPL-MCNC: 17 MG/DL (ref 5–25)
C DIFF TOX GENS STL QL NAA+PROBE: NEGATIVE
CALCIUM SERPL-MCNC: 8.6 MG/DL (ref 8.4–10.2)
CHLORIDE SERPL-SCNC: 109 MMOL/L (ref 96–108)
CO2 SERPL-SCNC: 23 MMOL/L (ref 21–32)
CREAT SERPL-MCNC: 1.1 MG/DL (ref 0.6–1.3)
ERYTHROCYTE [DISTWIDTH] IN BLOOD BY AUTOMATED COUNT: 13 % (ref 11.6–15.1)
GFR SERPL CREATININE-BSD FRML MDRD: 73 ML/MIN/1.73SQ M
GLUCOSE SERPL-MCNC: 104 MG/DL (ref 65–140)
GLUCOSE SERPL-MCNC: 99 MG/DL (ref 65–140)
HCT VFR BLD AUTO: 42.4 % (ref 36.5–49.3)
HGB BLD-MCNC: 14.2 G/DL (ref 12–17)
MAGNESIUM SERPL-MCNC: 2 MG/DL (ref 1.9–2.7)
MCH RBC QN AUTO: 29.2 PG (ref 26.8–34.3)
MCHC RBC AUTO-ENTMCNC: 33.5 G/DL (ref 31.4–37.4)
MCV RBC AUTO: 87 FL (ref 82–98)
PHOSPHATE SERPL-MCNC: 3.4 MG/DL (ref 2.7–4.5)
PLATELET # BLD AUTO: 121 THOUSANDS/UL (ref 149–390)
PMV BLD AUTO: 11.1 FL (ref 8.9–12.7)
POTASSIUM SERPL-SCNC: 4 MMOL/L (ref 3.5–5.3)
PROT SERPL-MCNC: 6.1 G/DL (ref 6.4–8.4)
RBC # BLD AUTO: 4.87 MILLION/UL (ref 3.88–5.62)
SODIUM SERPL-SCNC: 141 MMOL/L (ref 135–147)
WBC # BLD AUTO: 4.56 THOUSAND/UL (ref 4.31–10.16)

## 2024-09-25 PROCEDURE — 99232 SBSQ HOSP IP/OBS MODERATE 35: CPT | Performed by: INTERNAL MEDICINE

## 2024-09-25 PROCEDURE — 99239 HOSP IP/OBS DSCHRG MGMT >30: CPT | Performed by: NURSE PRACTITIONER

## 2024-09-25 PROCEDURE — 80053 COMPREHEN METABOLIC PANEL: CPT | Performed by: NURSE PRACTITIONER

## 2024-09-25 PROCEDURE — 82948 REAGENT STRIP/BLOOD GLUCOSE: CPT

## 2024-09-25 PROCEDURE — 85027 COMPLETE CBC AUTOMATED: CPT | Performed by: NURSE PRACTITIONER

## 2024-09-25 PROCEDURE — 83735 ASSAY OF MAGNESIUM: CPT | Performed by: NURSE PRACTITIONER

## 2024-09-25 PROCEDURE — 84100 ASSAY OF PHOSPHORUS: CPT | Performed by: NURSE PRACTITIONER

## 2024-09-25 RX ORDER — INSULIN GLARGINE 300 U/ML
35 INJECTION, SOLUTION SUBCUTANEOUS
Start: 2024-09-25

## 2024-09-25 RX ORDER — CLOZAPINE 200 MG/1
300 TABLET ORAL
Start: 2024-09-25

## 2024-09-25 RX ORDER — SIMETHICONE 80 MG
80 TABLET,CHEWABLE ORAL
Qty: 60 TABLET | Refills: 0 | Status: SHIPPED | OUTPATIENT
Start: 2024-09-25

## 2024-09-25 RX ORDER — DICYCLOMINE HCL 20 MG
20 TABLET ORAL
Qty: 45 TABLET | Refills: 0 | Status: SHIPPED | OUTPATIENT
Start: 2024-09-25

## 2024-09-25 RX ORDER — ALPRAZOLAM 0.5 MG
0.5 TABLET ORAL 2 TIMES DAILY PRN
Start: 2024-09-25

## 2024-09-25 RX ORDER — SERTRALINE HYDROCHLORIDE 100 MG/1
100 TABLET, FILM COATED ORAL DAILY
Start: 2024-09-25

## 2024-09-25 RX ADMIN — SIMETHICONE 80 MG: 80 TABLET, CHEWABLE ORAL at 11:25

## 2024-09-25 RX ADMIN — SIMETHICONE 80 MG: 80 TABLET, CHEWABLE ORAL at 08:21

## 2024-09-25 RX ADMIN — DICYCLOMINE HYDROCHLORIDE 20 MG: 20 TABLET ORAL at 06:03

## 2024-09-25 RX ADMIN — PANTOPRAZOLE SODIUM 40 MG: 40 TABLET, DELAYED RELEASE ORAL at 08:22

## 2024-09-25 RX ADMIN — SERTRALINE HYDROCHLORIDE 100 MG: 100 TABLET ORAL at 08:22

## 2024-09-25 RX ADMIN — DICYCLOMINE HYDROCHLORIDE 20 MG: 20 TABLET ORAL at 11:25

## 2024-09-25 NOTE — DISCHARGE INSTR - AVS FIRST PAGE
Diarrhea, Lower abdominal pain, Abdominal bloating, History of liver cirrhosis    -Continue simethicone 80 mg 4 times daily.  -Continue dicyclomine 20 mg 3 times daily.  -Continue pantoprazole 40 mg once daily.  -Schedule follow-up office visit with gastroenterology (GI).  You would benefit from US elastography, EGD to screen for esophageal varices, and screening colonoscopy.

## 2024-09-25 NOTE — NURSING NOTE
Patient given discharge instructions and verbalized understanding. Belongings given to patient. Patient in no distress and has no concerns at this time. Patient picked up by family member.

## 2024-09-25 NOTE — ASSESSMENT & PLAN NOTE
Lab Results   Component Value Date    HGBA1C 8.2 (H) 07/27/2024     With hyperglycemia on admission in the setting of acute infection/stress, resolved  Increase Lantus from 32 to 35 units at night  DC standing dose humalog to avoid hypoglycemia given decreased oral intake   Continue sliding scale and adjust as needed  Follow-up with PCP

## 2024-09-25 NOTE — PLAN OF CARE
Problem: PAIN - ADULT  Goal: Verbalizes/displays adequate comfort level or baseline comfort level  Description: Interventions:  - Encourage patient to monitor pain and request assistance  - Assess pain using appropriate pain scale  - Administer analgesics based on type and severity of pain and evaluate response  - Implement non-pharmacological measures as appropriate and evaluate response  - Consider cultural and social influences on pain and pain management  - Notify physician/advanced practitioner if interventions unsuccessful or patient reports new pain  Outcome: Progressing     Problem: INFECTION - ADULT  Goal: Absence or prevention of progression during hospitalization  Description: INTERVENTIONS:  - Assess and monitor for signs and symptoms of infection  - Monitor lab/diagnostic results  - Monitor all insertion sites, i.e. indwelling lines, tubes, and drains  - Monitor endotracheal if appropriate and nasal secretions for changes in amount and color  - Collins appropriate cooling/warming therapies per order  - Administer medications as ordered  - Instruct and encourage patient and family to use good hand hygiene technique  - Identify and instruct in appropriate isolation precautions for identified infection/condition  Outcome: Progressing     Problem: SAFETY ADULT  Goal: Patient will remain free of falls  Description: INTERVENTIONS:  - Educate patient/family on patient safety including physical limitations  - Instruct patient to call for assistance with activity   - Consult OT/PT to assist with strengthening/mobility   - Keep Call bell within reach  - Keep bed low and locked with side rails adjusted as appropriate  - Keep care items and personal belongings within reach  - Initiate and maintain comfort rounds  - Make Fall Risk Sign visible to staff  - Apply yellow socks and bracelet for high fall risk patients  - Consider moving patient to room near nurses station  Outcome: Progressing     Problem:  GASTROINTESTINAL - ADULT  Goal: Maintains or returns to baseline bowel function  Description: INTERVENTIONS:  - Assess bowel function  - Encourage oral fluids to ensure adequate hydration  - Administer IV fluids if ordered to ensure adequate hydration  - Administer ordered medications as needed  - Encourage mobilization and activity  - Consider nutritional services referral to assist patient with adequate nutrition and appropriate food choices  Outcome: Progressing

## 2024-09-25 NOTE — ASSESSMENT & PLAN NOTE
Stable, no signs of bleeding or bruising   Likely due to cirrhosis   -> 122  OP follow-up with PCP and GI

## 2024-09-25 NOTE — DISCHARGE SUMMARY
Discharge Summary - Hospitalist   Name: Sg Kaplan 59 y.o. male I MRN: [id number]  Unit/Bed#: 7T [facility] 706-02 I Date of Admission: 9/20/2024   Date of Service: 9/25/2024 I Hospital Day: 5     Assessment & Plan  Diarrhea of presumed infectious origin  Likely viral gastroenteritis   CT abdomen showed mild diffuse enteritis/ileus  Diarrhea initially resolved but resumed after diet was advanced  Low suspicion for C. difficile or bacterial etiology given less than 3 BMs per day  Appreciate GI input  Improving with simethicone 80 mg 4 times daily, dicyclomine 20 mg 3 times daily, and pantoprazole 40 mg once daily  Stable for discharge home. Will call patient if stool studies are abnormal   Recommend follow-up office visit with gastroenterology for US elastography, EGD to screen for esophageal varices, and screening colonoscopy  Type 2 diabetes mellitus with hyperglycemia, with long-term current use of insulin (HCC)  Lab Results   Component Value Date    HGBA1C 8.2 (H) 07/27/2024     With hyperglycemia on admission in the setting of acute infection/stress, resolved  Increase Lantus from 32 to 35 units at night  DC standing dose humalog to avoid hypoglycemia given decreased oral intake   Continue sliding scale and adjust as needed  Follow-up with PCP  Liver cirrhosis (HCC)  History of cirrhosis, reportedly medication induced.  Compensated with no encephalopathy  CT with no ascites.  No peripheral edema on exam.  Albumin normal at 4.3.  Abdominal US with no scites  Appreciate GI input - recommend outpatient US elastography and outpatient EGD for variceal screening   Anxiety  Mood stable.  Continue home Clozaril 300 mg daily, Xanax 0.5 mg twice daily as needed and Zoloft 100 mg daily  Thrombocytopenia (HCC)  Stable, no signs of bleeding or bruising   Likely due to cirrhosis   -> 122  OP follow-up with PCP and GI   Hypophosphatemia (Resolved: 9/25/2024)  K 3.7 -> 4.0  Phos 2.3 -> 3.4  Likely from decreased oral intake    Improved with Neutra phos 1 tab x3 doses   Hypomagnesemia (Resolved: 9/25/2024)  Mag 1.8 -> 2.0 improved after repletion and improvement in diet      Medical Problems       Resolved Problems  Date Reviewed: 9/20/2024            Resolved    Acute kidney injury (HCC) 9/24/2024     Resolved by  BARBARA Mckee    Hypophosphatemia 9/25/2024     Resolved by  BARBARA Mckee    Hypomagnesemia 9/25/2024     Resolved by  BARBARA Mckee        Discharging Physician / Practitioner: BARBARA Mckee  PCP: Arnie Kat MD  Admission Date:   Admission Orders (From admission, onward)       Ordered        09/20/24 1325  INPATIENT ADMISSION  Once                          Discharge Date: 09/25/24    Consultations During Hospital Stay:  Gastroenterology    Procedures Performed:   CT abdomen pelvis: 1.  Mild diffuse small bowel dilation. This appearance is consistent with a mild diffuse ileus or enteritis. No evidence of a bowel obstruction. 2.  Diffuse hepatic steatosis and mild nonspecific splenomegaly. The associated recanalization of the paraumbilical vein, raising the concern for potential portal hypertension. Follow-up nonemergent evaluation by ultrasound elastography of the liver should be considered.  Abdominal ultrasound: No visible ascites    Significant Findings / Test Results:   Diarrhea, lower abdominal pain, abdominal bloating    Incidental Findings:   Mild diffuse small bowel dilatation, mild diffuse ileus or enteritis  Diffuse hepatic steatosis and mild nonspecific splenomegaly and potential portal hypertension  I reviewed the above mentioned incidental findings with the patient and/or family and they expressed understanding.    Test Results Pending at Discharge (will require follow up):   Stool enteric bacterial panel, stool C. difficile toxin, stool O&P     Outpatient Tests Requested:  Close follow-up with PCP and gastroenterology    Complications: None    Reason  for Admission: Diarrhea    Hospital Course:   Sg Kaplan is a 59 y.o. male patient with a past medical history including cirrhosis, diabetes mellitus, and anxiety who originally presented to the hospital on 9/20/2024 due to abdominal pain and diarrhea for 1 day.  Patient was in his usual state of health and then developed 7 episodes of loose watery stools.  This was accompanied with abdominal pain and subjective fever.  Patient presented to the emergency department where he was found to have severe hyperglycemia and acute kidney injury.  Patient was admitted for further evaluation and treatment.  Patient was seen by gastroenterology.  Patient was suspected to have viral gastroenteritis.  He was treated with bowel rest and IV fluids.  His electrolytes were repleted as needed.  Initially, diarrhea resolved upon admission bowel rest, however, it returned with resumption of diet.  Gastroenterology wanted to rule out infectious disease and recommended stool bacterial panel, C. difficile, and O&P which are pending.  Gastroenterology added Bentyl and simethicone with improvement in patient's symptoms.  At this time, patient's abdominal pain has improved.  He is eating and drinking well.  He desires discharge home today.  Patient and his mother are aware that we are still awaiting the infectious stool studies.  If they return positive, I will call the patient and prescribe appropriate antibiotics as needed.  Otherwise, recommend patient continue Bentyl and simethicone for supportive care and follow-up closely with his PCP and gastroenterologist.    Please see above list of diagnoses and related plan for additional information.     Condition at Discharge: stable    Discharge Day Visit / Exam:   Subjective: Patient seen and examined at bedside.  He reports improvement in his abdominal pain and tenderness.  He is tolerating his diet.  He feels like the medications from yesterday are helping.  He denies headache, dizziness,  "chest pain, shortness of breath, vomiting, or leg swelling.  He desires discharge home today.  We called his mother at bedside together to review the discharge plan with her.  She will pick the patient up between 11 AM and 12 PM today.  Vitals: Blood Pressure: 117/88 (09/25/24 0736)  Pulse: 72 (09/25/24 0736)  Temperature: (!) 96.4 °F (35.8 °C) (09/25/24 0736)  Temp Source: Temporal (09/25/24 0736)  Respirations: 20 (09/25/24 0736)  Height: 6' 0.01\" (182.9 cm) (09/20/24 1345)  Weight - Scale: 94.1 kg (207 lb 7.3 oz) (09/20/24 1345)  SpO2: 96 % (09/25/24 0736)  Exam:   Physical Exam  Vitals and nursing note reviewed.   Constitutional:       General: He is not in acute distress.     Appearance: He is well-developed. He is not toxic-appearing or diaphoretic.   HENT:      Head: Normocephalic.      Mouth/Throat:      Mouth: Mucous membranes are moist.   Cardiovascular:      Rate and Rhythm: Normal rate and regular rhythm.   Pulmonary:      Effort: Pulmonary effort is normal. No respiratory distress.      Breath sounds: Normal breath sounds. No wheezing, rhonchi or rales.   Abdominal:      General: Bowel sounds are normal. There is no distension.      Palpations: Abdomen is soft.      Tenderness: There is no abdominal tenderness.      Comments: Obese, soft   Musculoskeletal:         General: No tenderness or edema. Normal range of motion.      Cervical back: Normal range of motion.      Right lower leg: No edema.      Left lower leg: No edema.   Skin:     General: Skin is warm and dry.      Findings: No rash.   Neurological:      Mental Status: He is alert and oriented to person, place, and time.      Deep Tendon Reflexes: Reflexes are normal and symmetric.   Psychiatric:         Mood and Affect: Mood and affect normal. Affect is flat.         Speech: Speech normal.         Behavior: Behavior is slowed and withdrawn. Behavior is cooperative.         Cognition and Memory: Cognition normal.         Judgment: Judgment " normal.          Discussion with Family: Updated  (mother) via phone.    Discharge instructions/Information to patient and family:   See after visit summary for information provided to patient and family.      Provisions for Follow-Up Care:  See after visit summary for information related to follow-up care and any pertinent home health orders.      Mobility at time of Discharge:   Basic Mobility Inpatient Raw Score: 23  JH-HLM Goal: 7: Walk 25 feet or more  JH-HLM Achieved: 7: Walk 25 feet or more  HLM Goal achieved. Continue to encourage appropriate mobility.     Disposition:   Home    Planned Readmission: None    Discharge Medications:  See after visit summary for reconciled discharge medications provided to patient and/or family.      Administrative Statements   Discharge Statement:  I have spent a total time of > 30 minutes in caring for this patient on the day of the visit/encounter. >30 minutes of time was spent on: Diagnostic results, Risks and benefits of tx options, Instructions for management, Patient and family education, Importance of tx compliance, Impressions, Counseling / Coordination of care, Documenting in the medical record, Reviewing / ordering tests, medicine, procedures  , and Communicating with other healthcare professionals .    **Please Note: This note may have been constructed using a voice recognition system**

## 2024-09-25 NOTE — ASSESSMENT & PLAN NOTE
Likely viral gastroenteritis   CT abdomen showed mild diffuse enteritis/ileus  Diarrhea initially resolved but resumed after diet was advanced  Low suspicion for C. difficile or bacterial etiology given less than 3 BMs per day  Appreciate GI input  Improving with simethicone 80 mg 4 times daily, dicyclomine 20 mg 3 times daily, and pantoprazole 40 mg once daily  Stable for discharge home. Will call patient if stool studies are abnormal   Recommend follow-up office visit with gastroenterology for US elastography, EGD to screen for esophageal varices, and screening colonoscopy

## 2024-09-25 NOTE — ASSESSMENT & PLAN NOTE
Mood stable.  Continue home Clozaril 300 mg daily, Xanax 0.5 mg twice daily as needed and Zoloft 100 mg daily

## 2024-09-25 NOTE — PROGRESS NOTES
Progress Note- Sg Kaplan 59 y.o. male MRN: [id number]    Unit/Bed#: 7T [facility] 706-02 Encounter: [id number]      Assessment and Plan:  59-year-old male with chart-diagnosis of cirrhosis (presumed compensated, does not follow GI) which he attributes to prior psych medications, diabetes, anxiety, schizoaffective disorder admitted with abdominal pain and diarrhea, found to have DENZEL (improved).  GI following for abdominal distension/tenderness and loose stools.    1.  Diarrhea  2.  Lower abdominal pain  3.  Abdominal bloating  4.  Cirrhosis, presumed compensated  Patient originally presented with nausea/vomiting and diarrhea, which has overall improved throughout hospitalization but now with lower abdominal pain and abdominal distension.  CT abdomen pelvis 9/20/2024 showed diffusely dilated and fluid-filled small bowel loops without a transition point, hepatomegaly, and mild splenomegaly; no ascites.  RUQ US 9/23/2024 with no evidence of ascites.  Patient reports a history of cirrhosis related to a past psychiatric medication.     -Follow up results of infectious stool tests.  -Continue simethicone 80 mg 4 times daily.  -Continue dicyclomine 20 mg 3 times daily.  -Continue pantoprazole 40 mg once daily.  -Agree with low residue diet.  -Recommend patient's schedule follow-up office visit with GI.  Patient would benefit from US elastography, EGD to screen for esophageal varices, and screening colonoscopy.    GI will sign off    ______________________________________________________________________    Subjective:  Patient reports feeling improved from yesterday.  Had 4 episode of loose stools yesterday, but overall abdominal discomfort and bloating are improved.        Medication Administration - last 24 hours from 09/24/2024 0858 to 09/25/2024 0858         Date/Time Order Dose Route Action Action by     09/25/2024 0822 EDT pantoprazole (PROTONIX) EC tablet 40 mg 40 mg Oral Given Deanne Glez RN     09/25/2024 0822 EDT  sertraline (ZOLOFT) tablet 100 mg 100 mg Oral Given Deanne Glez RN     09/24/2024 2100 EDT clozapine (CLOZARIL) tablet 200 mg 200 mg Oral Given Farrah Haro RN     09/25/2024 0655 EDT insulin lispro (HumALOG/ADMELOG) 100 units/mL subcutaneous injection 1-5 Units -- Subcutaneous Not Given Deanne Glez RN     09/24/2024 1617 EDT insulin lispro (HumALOG/ADMELOG) 100 units/mL subcutaneous injection 1-5 Units -- Subcutaneous Not Given Deanne Glez RN     09/24/2024 1149 EDT insulin lispro (HumALOG/ADMELOG) 100 units/mL subcutaneous injection 1-5 Units 1 Units Subcutaneous Given Deanne Glez RN     09/25/2024 0603 EDT dicyclomine (BENTYL) tablet 20 mg 20 mg Oral Given Farrah Haro RN     09/24/2024 1615 EDT dicyclomine (BENTYL) tablet 20 mg 20 mg Oral Given Deanne Glez RN     09/24/2024 1148 EDT dicyclomine (BENTYL) tablet 20 mg 20 mg Oral Given Deanne Glez RN     09/25/2024 0821 EDT simethicone (MYLICON) chewable tablet 80 mg 80 mg Oral Given Deanne Glez RN     09/24/2024 2100 EDT simethicone (MYLICON) chewable tablet 80 mg 80 mg Oral Given Farrah Haro RN     09/24/2024 1615 EDT simethicone (MYLICON) chewable tablet 80 mg 80 mg Oral Given Deanne Glez RN     09/24/2024 0937 EDT simethicone (MYLICON) chewable tablet 80 mg 80 mg Oral Given Deanne Glez RN     09/24/2024 2100 EDT insulin glargine (LANTUS) subcutaneous injection 35 Units 0.35 mL 35 Units Subcutaneous Given Farrah Haro RN     09/24/2024 2100 EDT potassium-sodium phosphateS (K-PHOS,PHOSPHA 250) -250 mg tablet 1 tablet 1 tablet Oral Given Farrah Haro RN     09/24/2024 1615 EDT potassium-sodium phosphateS (K-PHOS,PHOSPHA 250) -250 mg tablet 1 tablet 1 tablet Oral Given Deanne Glez RN     09/24/2024 1148 EDT potassium-sodium phosphateS (K-PHOS,PHOSPHA 250) -250 mg tablet 1 tablet 1 tablet Oral Given Deanne Glez RN     09/25/2024 0655 EDT magnesium sulfate 2 g/50 mL IVPB (premix) 2 g 0 g Intravenous Stopped Deanne Glez RN     " 09/24/2024 0937 EDT magnesium sulfate 2 g/50 mL IVPB (premix) 2 g 2 g Intravenous New Bag Deanne Glez RN            Objective:     Vitals: Blood pressure 117/88, pulse 72, temperature (!) 96.4 °F (35.8 °C), temperature source Temporal, resp. rate 20, height 6' 0.01\" (1.829 m), weight 94.1 kg (207 lb 7.3 oz), SpO2 96%.,Body mass index is 28.13 kg/m².      Intake/Output Summary (Last 24 hours) at 9/25/2024 0858  Last data filed at 9/25/2024 0856  Gross per 24 hour   Intake 1260 ml   Output --   Net 1260 ml       Physical Exam:   General Appearance: Awake and alert, in no acute distress  Abdomen: Soft, non-tender, + distended, improved from exam yesterday; bowel sounds normal; no masses or no organomegaly    Invasive Devices       Peripheral Intravenous Line  Duration             Peripheral IV 09/24/24 Right;Ventral (anterior) Wrist 1 day                    Lab Results:  No results displayed because visit has over 200 results.          Imaging Studies: I have personally reviewed pertinent imaging studies.      "

## 2024-09-25 NOTE — ASSESSMENT & PLAN NOTE
K 3.7 -> 4.0  Phos 2.3 -> 3.4  Likely from decreased oral intake   Improved with Neutra phos 1 tab x3 doses

## 2024-09-26 LAB
C COLI+JEJUNI TUF STL QL NAA+PROBE: NEGATIVE
EC STX1+STX2 GENES STL QL NAA+PROBE: NEGATIVE
SALMONELLA SP SPAO STL QL NAA+PROBE: NEGATIVE
SHIGELLA SP+EIEC IPAH STL QL NAA+PROBE: NEGATIVE

## 2024-09-26 NOTE — UTILIZATION REVIEW
NOTIFICATION OF ADMISSION DISCHARGE   This is a Notification of Discharge from [facility]. Please be advised that this patient has been discharge from our facility. Below you will find the admission and discharge date and time including the patient’s disposition.   UTILIZATION REVIEW CONTACT:  Debra Wright MA  Utilization   Network Utilization Review Department  Phone: [phone] x carefully listen to the prompts. All voicemails are confidential.  Email: [email or web address]     ADMISSION INFORMATION  PRESENTATION DATE: 9/20/2024 12:06 PM  OBERVATION ADMISSION DATE: N/A  INPATIENT ADMISSION DATE: 9/20/24  1:25 PM   DISCHARGE DATE: 9/25/2024 12:04 PM   DISPOSITION:Home/Self Care    Network Utilization Review Department  ATTENTION: Please call with any questions or concerns to [phone] and carefully listen to the prompts so that you are directed to the right person. All voicemails are confidential.   For Discharge needs, contact Care Management DC Support Team at [phone] opt. [phone]  Send all requests for admission clinical reviews, approved or denied determinations and any other requests to dedicated fax number below belonging to the campus where the patient is receiving treatment. List of dedicated fax numbers for the Facilities:  FACILITY NAME UR FAX NUMBER   ADMISSION DENIALS (Administrative/Medical Necessity) [phone]   DISCHARGE SUPPORT TEAM ([facility]) [phone]   PARENT CHILD HEALTH (Maternity/NICU/Pediatrics) [phone]   [facility] [phone]   [facility] [phone]   [facility] [phone]   [facility] [phone]   [facility] [phone]   [facility] [phone]   [facility] [phone]   [facility]  [phone]   [facility] [phone]   [facility] [phone]   [facility] [phone]   [facility] [phone]

## 2024-09-27 ENCOUNTER — TELEPHONE (OUTPATIENT)
Age: 59
End: 2024-09-27

## 2024-09-27 NOTE — TELEPHONE ENCOUNTER
----- Message from Farhad Velazquez PA-C sent at 9/23/2024 12:00 PM EDT -----  Regarding: Hospital Follow Up  [facility], can you please schedule this patient for a hospital follow-up with Nhung Gonzales?  He used to follow with her at [facility].  Thanks so much!      I spoke with pt and make a f/u appt with Caitlin Gonzales on 09/30.

## 2024-09-28 LAB
G LAMBLIA AG STL QL IA: NEGATIVE
O+P STL CONC: NORMAL

## 2024-09-30 ENCOUNTER — TELEPHONE (OUTPATIENT)
Dept: GASTROENTEROLOGY | Facility: MEDICAL CENTER | Age: 59
End: 2024-09-30

## 2024-09-30 ENCOUNTER — OFFICE VISIT (OUTPATIENT)
Dept: GASTROENTEROLOGY | Facility: MEDICAL CENTER | Age: 59
End: 2024-09-30
Payer: MEDICARE

## 2024-09-30 VITALS
BODY MASS INDEX: 28.04 KG/M2 | DIASTOLIC BLOOD PRESSURE: 74 MMHG | WEIGHT: 207 LBS | HEART RATE: 70 BPM | HEIGHT: 72 IN | TEMPERATURE: 97.8 F | OXYGEN SATURATION: 98 % | SYSTOLIC BLOOD PRESSURE: 128 MMHG

## 2024-09-30 DIAGNOSIS — K59.04 CHRONIC IDIOPATHIC CONSTIPATION: ICD-10-CM

## 2024-09-30 DIAGNOSIS — K74.60 CIRRHOSIS OF LIVER WITHOUT ASCITES, UNSPECIFIED HEPATIC CIRRHOSIS TYPE (HCC): Primary | ICD-10-CM

## 2024-09-30 PROCEDURE — 99214 OFFICE O/P EST MOD 30 MIN: CPT | Performed by: NURSE PRACTITIONER

## 2024-09-30 RX ORDER — POLYETHYLENE GLYCOL 3350 17 G/17G
17 POWDER, FOR SOLUTION ORAL DAILY
Qty: 527 G | Refills: 3 | Status: SHIPPED | OUTPATIENT
Start: 2024-09-30

## 2024-09-30 RX ORDER — POLYETHYLENE GLYCOL 3350, SODIUM SULFATE ANHYDROUS, SODIUM BICARBONATE, SODIUM CHLORIDE, POTASSIUM CHLORIDE 236; 22.74; 6.74; 5.86; 2.97 G/4L; G/4L; G/4L; G/4L; G/4L
4000 POWDER, FOR SOLUTION ORAL ONCE
Qty: 4000 ML | Refills: 0 | Status: SHIPPED | OUTPATIENT
Start: 2024-09-30 | End: 2024-09-30

## 2024-09-30 RX ORDER — DAPAGLIFLOZIN 10 MG/1
10 TABLET, FILM COATED ORAL DAILY
COMMUNITY
Start: 2024-09-18

## 2024-09-30 NOTE — PROGRESS NOTES
[facility] - Outpatient Follow-up Note  Sg Kaplan 59 y.o. male MRN: [id number]  Encounter: [id number]          ASSESSMENT AND PLAN:      1.  Cirrhosis    History of compensated cirrhosis for several years.  Reports he had a biopsy many years ago and was told he had cirrhosis that was secondary to medications/fatty liver.  He does have a history of diabetes mellitus and obesity.  Never hospitalized for encephalopathy.  Denies ever having a paracentesis.  Weight has been stable.  Feeling well overall.  Patient will need screening for HCC with labs and imaging every 6 months as well as EGD to screen for esophageal varices.    -Labs for MELD every 6 months  -Imaging every 6 months.  Up-to-date on imaging.  Ultrasound of the right upper quadrant 9/24 noted no visible ascites.  CT abdomen and pelvis 9/24 noted no liver masses.  -EGD to screen for esophageal varices  -2 g sodium diet  -Follow-up in office after testing      2. Colon cancer screening    Reports he had a colonoscopy approximately 10 years ago.  Reports it was normal.  Report not available during visit today to review.  BMs are brown and formed daily.  He does have occasional days where he does not have a BM.  No straining to have a BM.  Denies any weight loss or abdominal pain.  No family history of any colon cancers or polyps.    -Colonoscopy with GoLytely/Dulcolax prep  -Start MiraLAX 1 capful daily  -Obtain records from [facility]  ______________________________________________________________________    SUBJECTIVE: 59-year-old male recently seen by GI in the hospital 9/25/2024.  He does have a past medical history of cirrhosis that is compensated, diabetes, anxiety, schizoaffective disorder.  He was admitted with abdominal pain nausea, vomiting and diarrhea and was found to have DENZEL.  Right upper quadrant ultrasound 9/23/2024 noted no evidence of ascites.  Reports history of cirrhosis related to past psychiatric  medications.    Interval history: He is a previous patient of [facility].  Here for hospital follow-up.  Reports nausea and vomiting completely resolved.  Diarrhea has also resolved.  Last BM was 2 days ago.  Reports BMs are approximately every other day.  No abdominal pain.  No lower extremity edema.  History of cirrhosis was compensated.  Reports he had a liver biopsy approximately 10 years ago and told it was related to fat her medication.  No records from [facility] in chart to review today.    Labs 9/25/2024-CMP normal other than chloride 109, total protein 6.1.  Sodium was 141 and LFTs were normal.  Creatinine was 1.10, CBC normal other than platelets of 121.    CT abdomen pelvis 9/20/2024 noted mild diffuse small bowel dilation.    This appearance is consistent with mild diffuse ileus or enteritis.  No evidence of bowel obstruction.  Diffuse hepatic steatosis and mild nonspecific splenomegaly.  The associated recanalization of the periumbilical vein raising the concern for potential portal hypertension.    Ultrasound right upper quadrant 9/24-no visible ascites.      Prior EGD/colonoscopy     Colon 2014- normal per pt    EGD ? 10 years ago per pt.     REVIEW OF SYSTEMS IS OTHERWISE NEGATIVE.  10 point review of systems negative other than per HPI        Historical Information   Past Medical History:   Diagnosis Date    Anemia     Bipolar disorder (HCC)     Diabetes mellitus (HCC)     Diabetes mellitus, type II (HCC)     Gout     PAT (paroxysmal atrial tachycardia)     Schizophrenic disorder (HCC)      History reviewed. No pertinent surgical history.  Social History   Social History     Substance and Sexual Activity   Alcohol Use Never     Social History     Substance and Sexual Activity   Drug Use No     Social History     Tobacco Use   Smoking Status Every Day   Smokeless Tobacco Never     Family History   Problem Relation Age of Onset    Coronary artery disease Mother         premature       Meds/Allergies        Current Outpatient Medications:     ALPRAZolam (XANAX) 0.5 mg tablet    BD PEN NEEDLE TREV U/F 32G X 4 MM MISC    clozapine (CLOZARIL) 200 MG tablet    dicyclomine (BENTYL) 20 mg tablet    Farxiga 10 MG tablet    pantoprazole (PROTONIX) 40 mg tablet    sertraline (ZOLOFT) 100 mg tablet    simethicone (MYLICON) 80 mg chewable tablet    Toujeo SoloStar 300 units/mL CONCENTRATED U-300 injection pen (1-unit dial)    metFORMIN (GLUCOPHAGE) 500 mg tablet    Allergies   Allergen Reactions    Bupropion     Chlorproethazine     Molindone     Paliperidone     Thiazide-Type Diuretics            Objective     Blood pressure 128/74, pulse 70, temperature 97.8 °F (36.6 °C), height 6' (1.829 m), weight 93.9 kg (207 lb), SpO2 98%. Body mass index is 28.07 kg/m².      PHYSICAL EXAM:      General Appearance:   Alert, cooperative, no distress   HEENT:   Normocephalic, atraumatic, anicteric.     Neck:  Supple, symmetrical, trachea midline   Lungs:   Clear to auscultation bilaterally; no rales, rhonchi or wheezing; respirations unlabored    Heart::   Regular rate and rhythm; no murmur, rub, or gallop.   Abdomen:   Soft, non-tender, non-distended; normal bowel sounds; no masses, no organomegaly    Genitalia:   Deferred    Rectal:   Deferred    Extremities:  No cyanosis, clubbing or edema    Pulses:  2+ and symmetric    Skin:  No jaundice, rashes, or lesions    Lymph nodes:  No palpable cervical lymphadenopathy        Lab Results:   No visits with results within 1 Day(s) from this visit.   Latest known visit with results is:   No results displayed because visit has over 200 results.            Radiology Results:   US abdomen limited    Result Date: 9/23/2024  Narrative: ABDOMEN ULTRASOUND, LIMITED, FOUR QUADRANT SURVEY INDICATION: eval for ascites, need for paracentesis, h/o cirrhosis with distended and tender abdomen. History of abdominal distention. COMPARISON: Ultrasound from 10/13/2017; CT from 9/20/2024 TECHNIQUE: Real-time  ultrasound of the abdominal cavity was performed with a curvilinear transducer with standard grey scale imaging techniques. FINDINGS: No ascites.     Impression: No visible ascites. Workstation performed: [id number]     CT abdomen pelvis wo contrast    Result Date: 9/20/2024  Narrative: CT ABDOMEN AND PELVIS WITHOUT IV CONTRAST INDICATION: Known cirrhosis/ascites, now with diarrhea, bita. r/o enterocolitis. COMPARISON: CT abdomen pelvis 9/9/2021 TECHNIQUE: CT examination of the abdomen and pelvis was performed without intravenous contrast. Multiplanar 2D reformatted images were created from the source data. This examination, like all CT scans performed in the [facility], was performed utilizing techniques to minimize radiation dose exposure, including the use of iterative reconstruction and automated exposure control. Radiation dose length product (DLP) for this visit: 970 mGy-cm Enteric Contrast: Not administered. FINDINGS: Evaluation of the parenchymal organs, mucosa and urothelium is significantly limited without intravenous contrast. ABDOMEN LOWER CHEST: No clinically significant abnormality in the visualized lower chest. Unchanged 2 to 3 mm right middle lobe pulmonary nodule, consistent with benign etiologies. LIVER/BILIARY TREE: There is diffuse low-attenuation throughout the moderately enlarged liver. No convincing suspicious focal hepatic lesions. No suspicious biliary dilation. There is recanalization of the paraumbilical vein. GALLBLADDER: No calcified gallstones. No pericholecystic inflammatory change. SPLEEN: Mildly enlarged, measuring 14 cm in cephalocaudal dimension. PANCREAS: Unremarkable. ADRENAL GLANDS: Unremarkable. KIDNEYS/URETERS: Unremarkable. No hydronephrosis. STOMACH AND BOWEL: Stomach is unremarkable. There is mild diffuse dilation of fluid-filled small bowel loops, without a focal transition point. APPENDIX: No findings to suggest appendicitis. ABDOMINOPELVIC CAVITY: No  ascites. No pneumoperitoneum. No lymphadenopathy. VESSELS: Unremarkable for patient's age. PELVIS REPRODUCTIVE ORGANS: Unremarkable for patient's age. URINARY BLADDER: Unremarkable. ABDOMINAL WALL/INGUINAL REGIONS: Unremarkable. BONES: No acute fracture or suspicious osseous lesion.     Impression: 1.  Mild diffuse small bowel dilation. This appearance is consistent with a mild diffuse ileus or enteritis. No evidence of a bowel obstruction. 2.  Diffuse hepatic steatosis and mild nonspecific splenomegaly. The associated recanalization of the paraumbilical vein, raising the concern for potential portal hypertension. Follow-up nonemergent evaluation by ultrasound elastography of the liver should be considered. 3.  The study was marked in EPIC for significant notification.. Workstation performed: [id number]

## 2024-09-30 NOTE — TELEPHONE ENCOUNTER
Scheduled date of Colonoscopy and EGD (as of today) October 15  Physician performing: Dr. Tuttle  Location of procedure:    Instructions given to patient: Golytely and Diabetic meds  Clearances: None  Diabetic: SoloStar; Farxiga (hold for 4 days prior-patient aware)

## 2024-10-10 ENCOUNTER — TELEPHONE (OUTPATIENT)
Dept: GASTROENTEROLOGY | Facility: MEDICAL CENTER | Age: 59
End: 2024-10-10

## 2024-10-14 RX ORDER — SODIUM CHLORIDE, SODIUM LACTATE, POTASSIUM CHLORIDE, CALCIUM CHLORIDE 600; 310; 30; 20 MG/100ML; MG/100ML; MG/100ML; MG/100ML
50 INJECTION, SOLUTION INTRAVENOUS CONTINUOUS
OUTPATIENT
Start: 2024-10-14

## 2024-10-20 PROBLEM — R19.7 DIARRHEA OF PRESUMED INFECTIOUS ORIGIN: Status: RESOLVED | Noted: 2024-09-20 | Resolved: 2024-10-20

## 2024-10-26 ENCOUNTER — APPOINTMENT (OUTPATIENT)
Dept: LAB | Facility: HOSPITAL | Age: 59
End: 2024-10-26
Payer: MEDICARE

## 2024-10-26 DIAGNOSIS — N18.6 TYPE 2 DIABETES MELLITUS WITH ESRD (END-STAGE RENAL DISEASE) (HCC): Primary | ICD-10-CM

## 2024-10-26 DIAGNOSIS — E78.5 HYPERLIPIDEMIA, UNSPECIFIED HYPERLIPIDEMIA TYPE: ICD-10-CM

## 2024-10-26 DIAGNOSIS — I10 ESSENTIAL HYPERTENSION, MALIGNANT: ICD-10-CM

## 2024-10-26 DIAGNOSIS — E11.22 TYPE 2 DIABETES MELLITUS WITH ESRD (END-STAGE RENAL DISEASE) (HCC): Primary | ICD-10-CM

## 2024-10-26 LAB
ALBUMIN SERPL BCG-MCNC: 4.1 G/DL (ref 3.5–5)
ALP SERPL-CCNC: 95 U/L (ref 34–104)
ALT SERPL W P-5'-P-CCNC: 26 U/L (ref 7–52)
ANION GAP SERPL CALCULATED.3IONS-SCNC: 10 MMOL/L (ref 4–13)
AST SERPL W P-5'-P-CCNC: 27 U/L (ref 13–39)
BASOPHILS # BLD AUTO: 0.02 THOUSANDS/ΜL (ref 0–0.1)
BASOPHILS NFR BLD AUTO: 0 % (ref 0–1)
BILIRUB SERPL-MCNC: 0.56 MG/DL (ref 0.2–1)
BUN SERPL-MCNC: 25 MG/DL (ref 5–25)
CALCIUM SERPL-MCNC: 9.2 MG/DL (ref 8.4–10.2)
CHLORIDE SERPL-SCNC: 108 MMOL/L (ref 96–108)
CHOLEST SERPL-MCNC: 205 MG/DL
CO2 SERPL-SCNC: 25 MMOL/L (ref 21–32)
CREAT SERPL-MCNC: 1.21 MG/DL (ref 0.6–1.3)
CREAT UR-MCNC: 67.7 MG/DL
EOSINOPHIL # BLD AUTO: 0 THOUSAND/ΜL (ref 0–0.61)
EOSINOPHIL NFR BLD AUTO: 0 % (ref 0–6)
ERYTHROCYTE [DISTWIDTH] IN BLOOD BY AUTOMATED COUNT: 13.2 % (ref 11.6–15.1)
EST. AVERAGE GLUCOSE BLD GHB EST-MCNC: 140 MG/DL
GFR SERPL CREATININE-BSD FRML MDRD: 65 ML/MIN/1.73SQ M
GLUCOSE P FAST SERPL-MCNC: 94 MG/DL (ref 65–99)
HBA1C MFR BLD: 6.5 %
HCT VFR BLD AUTO: 43.9 % (ref 36.5–49.3)
HDLC SERPL-MCNC: 39 MG/DL
HGB BLD-MCNC: 14 G/DL (ref 12–17)
IMM GRANULOCYTES # BLD AUTO: 0.02 THOUSAND/UL (ref 0–0.2)
IMM GRANULOCYTES NFR BLD AUTO: 0 % (ref 0–2)
LDLC SERPL CALC-MCNC: 141 MG/DL (ref 0–100)
LYMPHOCYTES # BLD AUTO: 1.07 THOUSANDS/ΜL (ref 0.6–4.47)
LYMPHOCYTES NFR BLD AUTO: 24 % (ref 14–44)
MCH RBC QN AUTO: 28.6 PG (ref 26.8–34.3)
MCHC RBC AUTO-ENTMCNC: 31.9 G/DL (ref 31.4–37.4)
MCV RBC AUTO: 90 FL (ref 82–98)
MICROALBUMIN UR-MCNC: <7 MG/L
MONOCYTES # BLD AUTO: 0.5 THOUSAND/ΜL (ref 0.17–1.22)
MONOCYTES NFR BLD AUTO: 11 % (ref 4–12)
NEUTROPHILS # BLD AUTO: 2.86 THOUSANDS/ΜL (ref 1.85–7.62)
NEUTS SEG NFR BLD AUTO: 65 % (ref 43–75)
NONHDLC SERPL-MCNC: 166 MG/DL
NRBC BLD AUTO-RTO: 0 /100 WBCS
PLATELET # BLD AUTO: 112 THOUSANDS/UL (ref 149–390)
PMV BLD AUTO: 11.1 FL (ref 8.9–12.7)
POTASSIUM SERPL-SCNC: 3.9 MMOL/L (ref 3.5–5.3)
PROT SERPL-MCNC: 6.9 G/DL (ref 6.4–8.4)
RBC # BLD AUTO: 4.89 MILLION/UL (ref 3.88–5.62)
SODIUM SERPL-SCNC: 143 MMOL/L (ref 135–147)
TRIGL SERPL-MCNC: 125 MG/DL
WBC # BLD AUTO: 4.47 THOUSAND/UL (ref 4.31–10.16)

## 2024-10-26 PROCEDURE — 83036 HEMOGLOBIN GLYCOSYLATED A1C: CPT

## 2024-10-26 PROCEDURE — 82043 UR ALBUMIN QUANTITATIVE: CPT

## 2024-10-26 PROCEDURE — 80061 LIPID PANEL: CPT

## 2024-10-26 PROCEDURE — 85025 COMPLETE CBC W/AUTO DIFF WBC: CPT

## 2024-10-26 PROCEDURE — 82570 ASSAY OF URINE CREATININE: CPT

## 2024-10-26 PROCEDURE — 80053 COMPREHEN METABOLIC PANEL: CPT

## 2024-10-26 PROCEDURE — 36415 COLL VENOUS BLD VENIPUNCTURE: CPT

## 2025-02-15 ENCOUNTER — APPOINTMENT (OUTPATIENT)
Dept: LAB | Facility: HOSPITAL | Age: 60
End: 2025-02-15
Payer: MEDICARE

## 2025-02-15 DIAGNOSIS — D64.9 ANEMIA, UNSPECIFIED TYPE: ICD-10-CM

## 2025-02-15 DIAGNOSIS — E78.5 HYPERLIPIDEMIA, UNSPECIFIED HYPERLIPIDEMIA TYPE: ICD-10-CM

## 2025-02-15 DIAGNOSIS — E13.69 OTHER SPECIFIED DIABETES MELLITUS WITH OTHER SPECIFIED COMPLICATION, UNSPECIFIED WHETHER LONG TERM INSULIN USE (HCC): ICD-10-CM

## 2025-02-15 LAB
ALBUMIN SERPL BCG-MCNC: 4.1 G/DL (ref 3.5–5)
ALP SERPL-CCNC: 82 U/L (ref 34–104)
ALT SERPL W P-5'-P-CCNC: 31 U/L (ref 7–52)
ANION GAP SERPL CALCULATED.3IONS-SCNC: 9 MMOL/L (ref 4–13)
AST SERPL W P-5'-P-CCNC: 27 U/L (ref 13–39)
BASOPHILS # BLD AUTO: 0.07 THOUSANDS/ΜL (ref 0–0.1)
BASOPHILS NFR BLD AUTO: 1 % (ref 0–1)
BILIRUB SERPL-MCNC: 0.47 MG/DL (ref 0.2–1)
BUN SERPL-MCNC: 24 MG/DL (ref 5–25)
CALCIUM SERPL-MCNC: 9.2 MG/DL (ref 8.4–10.2)
CHLORIDE SERPL-SCNC: 107 MMOL/L (ref 96–108)
CHOLEST SERPL-MCNC: 204 MG/DL (ref ?–200)
CO2 SERPL-SCNC: 28 MMOL/L (ref 21–32)
CREAT SERPL-MCNC: 1.35 MG/DL (ref 0.6–1.3)
EOSINOPHIL # BLD AUTO: 0 THOUSAND/ΜL (ref 0–0.61)
EOSINOPHIL NFR BLD AUTO: 0 % (ref 0–6)
ERYTHROCYTE [DISTWIDTH] IN BLOOD BY AUTOMATED COUNT: 12.9 % (ref 11.6–15.1)
EST. AVERAGE GLUCOSE BLD GHB EST-MCNC: 148 MG/DL
GFR SERPL CREATININE-BSD FRML MDRD: 57 ML/MIN/1.73SQ M
GLUCOSE P FAST SERPL-MCNC: 86 MG/DL (ref 65–99)
HBA1C MFR BLD: 6.8 %
HCT VFR BLD AUTO: 44.8 % (ref 36.5–49.3)
HDLC SERPL-MCNC: 36 MG/DL
HGB BLD-MCNC: 14.2 G/DL (ref 12–17)
IMM GRANULOCYTES # BLD AUTO: 0.03 THOUSAND/UL (ref 0–0.2)
IMM GRANULOCYTES NFR BLD AUTO: 1 % (ref 0–2)
LDLC SERPL CALC-MCNC: 140 MG/DL (ref 0–100)
LYMPHOCYTES # BLD AUTO: 1.23 THOUSANDS/ΜL (ref 0.6–4.47)
LYMPHOCYTES NFR BLD AUTO: 24 % (ref 14–44)
MCH RBC QN AUTO: 27.8 PG (ref 26.8–34.3)
MCHC RBC AUTO-ENTMCNC: 31.7 G/DL (ref 31.4–37.4)
MCV RBC AUTO: 88 FL (ref 82–98)
MONOCYTES # BLD AUTO: 0.48 THOUSAND/ΜL (ref 0.17–1.22)
MONOCYTES NFR BLD AUTO: 9 % (ref 4–12)
NEUTROPHILS # BLD AUTO: 3.38 THOUSANDS/ΜL (ref 1.85–7.62)
NEUTS SEG NFR BLD AUTO: 65 % (ref 43–75)
NONHDLC SERPL-MCNC: 168 MG/DL
NRBC BLD AUTO-RTO: 0 /100 WBCS
PLATELET # BLD AUTO: 115 THOUSANDS/UL (ref 149–390)
PLATELET BLD QL SMEAR: ABNORMAL
PMV BLD AUTO: 10.4 FL (ref 8.9–12.7)
POTASSIUM SERPL-SCNC: 4.1 MMOL/L (ref 3.5–5.3)
PROT SERPL-MCNC: 6.8 G/DL (ref 6.4–8.4)
RBC # BLD AUTO: 5.11 MILLION/UL (ref 3.88–5.62)
RBC MORPH BLD: NORMAL
SODIUM SERPL-SCNC: 144 MMOL/L (ref 135–147)
TRIGL SERPL-MCNC: 138 MG/DL (ref ?–150)
WBC # BLD AUTO: 5.19 THOUSAND/UL (ref 4.31–10.16)

## 2025-02-15 PROCEDURE — 85025 COMPLETE CBC W/AUTO DIFF WBC: CPT

## 2025-02-15 PROCEDURE — 80053 COMPREHEN METABOLIC PANEL: CPT

## 2025-02-15 PROCEDURE — 36415 COLL VENOUS BLD VENIPUNCTURE: CPT

## 2025-02-15 PROCEDURE — 80061 LIPID PANEL: CPT

## 2025-02-15 PROCEDURE — 83036 HEMOGLOBIN GLYCOSYLATED A1C: CPT

## 2025-04-19 ENCOUNTER — APPOINTMENT (OUTPATIENT)
Dept: LAB | Facility: HOSPITAL | Age: 60
End: 2025-04-19
Payer: MEDICARE

## 2025-04-19 DIAGNOSIS — R63.5 ABNORMAL WEIGHT GAIN: ICD-10-CM

## 2025-04-19 DIAGNOSIS — E13.69 OTHER SPECIFIED DIABETES MELLITUS WITH OTHER SPECIFIED COMPLICATION, UNSPECIFIED WHETHER LONG TERM INSULIN USE (HCC): ICD-10-CM

## 2025-04-19 LAB
CREAT UR-MCNC: 54 MG/DL
MICROALBUMIN UR-MCNC: <7 MG/L
PSA SERPL-MCNC: 0.48 NG/ML (ref 0–4)
TSH SERPL DL<=0.05 MIU/L-ACNC: 1.67 UIU/ML (ref 0.45–4.5)

## 2025-04-19 PROCEDURE — 84443 ASSAY THYROID STIM HORMONE: CPT

## 2025-04-19 PROCEDURE — 36415 COLL VENOUS BLD VENIPUNCTURE: CPT

## 2025-04-19 PROCEDURE — 82570 ASSAY OF URINE CREATININE: CPT

## 2025-04-19 PROCEDURE — G0103 PSA SCREENING: HCPCS

## 2025-04-19 PROCEDURE — 82043 UR ALBUMIN QUANTITATIVE: CPT

## 2025-07-21 ENCOUNTER — TELEPHONE (OUTPATIENT)
Dept: HEMATOLOGY ONCOLOGY | Facility: CLINIC | Age: 60
End: 2025-07-21

## 2025-07-21 NOTE — TELEPHONE ENCOUNTER
Patient phone number and mothers phone number not in service. Has not been on my chart since 03/18/2021

## 2025-08-09 ENCOUNTER — APPOINTMENT (OUTPATIENT)
Dept: LAB | Facility: HOSPITAL | Age: 60
End: 2025-08-09
Payer: MEDICARE